# Patient Record
Sex: MALE | Race: WHITE | NOT HISPANIC OR LATINO | Employment: OTHER | URBAN - METROPOLITAN AREA
[De-identification: names, ages, dates, MRNs, and addresses within clinical notes are randomized per-mention and may not be internally consistent; named-entity substitution may affect disease eponyms.]

---

## 2017-02-16 DIAGNOSIS — I10 ESSENTIAL HYPERTENSION, BENIGN: ICD-10-CM

## 2017-02-16 DIAGNOSIS — M1A.9XX0 CHRONIC GOUT WITHOUT TOPHUS, UNSPECIFIED CAUSE, UNSPECIFIED SITE: ICD-10-CM

## 2017-02-16 DIAGNOSIS — E78.5 HYPERLIPIDEMIA: ICD-10-CM

## 2017-02-16 DIAGNOSIS — I25.10 CORONARY ARTERY DISEASE INVOLVING NATIVE CORONARY ARTERY WITHOUT ANGINA PECTORIS, UNSPECIFIED WHETHER NATIVE OR TRANSPLANTED HEART: Chronic | ICD-10-CM

## 2017-02-16 RX ORDER — ALLOPURINOL 300 MG/1
TABLET ORAL
Qty: 90 TABLET | Refills: 3 | Status: SHIPPED | OUTPATIENT
Start: 2017-02-16 | End: 2018-05-08 | Stop reason: SDUPTHER

## 2017-02-16 RX ORDER — ATORVASTATIN CALCIUM 40 MG/1
TABLET, FILM COATED ORAL
Qty: 90 TABLET | Refills: 3 | Status: SHIPPED | OUTPATIENT
Start: 2017-02-16 | End: 2018-05-08 | Stop reason: SDUPTHER

## 2017-02-16 RX ORDER — GLIPIZIDE 5 MG/1
TABLET ORAL
Qty: 180 TABLET | Refills: 3 | Status: SHIPPED | OUTPATIENT
Start: 2017-02-16 | End: 2018-05-08 | Stop reason: SDUPTHER

## 2017-02-16 RX ORDER — LOSARTAN POTASSIUM 25 MG/1
TABLET ORAL
Qty: 90 TABLET | Refills: 3 | Status: SHIPPED | OUTPATIENT
Start: 2017-02-16 | End: 2018-05-08 | Stop reason: SDUPTHER

## 2017-02-16 RX ORDER — METOPROLOL SUCCINATE 50 MG/1
TABLET, EXTENDED RELEASE ORAL
Qty: 90 TABLET | Refills: 3 | Status: SHIPPED | OUTPATIENT
Start: 2017-02-16 | End: 2018-05-08 | Stop reason: SDUPTHER

## 2017-02-17 DIAGNOSIS — E11.9 TYPE 2 DIABETES MELLITUS WITHOUT COMPLICATION: ICD-10-CM

## 2017-02-27 ENCOUNTER — PATIENT MESSAGE (OUTPATIENT)
Dept: FAMILY MEDICINE | Facility: CLINIC | Age: 77
End: 2017-02-27

## 2017-03-17 DIAGNOSIS — E11.9 TYPE 2 DIABETES MELLITUS WITHOUT COMPLICATION: ICD-10-CM

## 2017-04-13 DIAGNOSIS — E11.9 DIABETES MELLITUS WITHOUT COMPLICATION: ICD-10-CM

## 2017-06-30 ENCOUNTER — OFFICE VISIT (OUTPATIENT)
Dept: FAMILY MEDICINE | Facility: CLINIC | Age: 77
End: 2017-06-30
Payer: MEDICARE

## 2017-06-30 VITALS
HEART RATE: 69 BPM | DIASTOLIC BLOOD PRESSURE: 70 MMHG | TEMPERATURE: 98 F | BODY MASS INDEX: 34.08 KG/M2 | SYSTOLIC BLOOD PRESSURE: 124 MMHG | WEIGHT: 217.13 LBS | HEIGHT: 67 IN | RESPIRATION RATE: 22 BRPM

## 2017-06-30 DIAGNOSIS — E11.22 TYPE 2 DIABETES MELLITUS WITH STAGE 3 CHRONIC KIDNEY DISEASE, WITHOUT LONG-TERM CURRENT USE OF INSULIN: ICD-10-CM

## 2017-06-30 DIAGNOSIS — Z23 NEED FOR PROPHYLACTIC VACCINATION AGAINST STREPTOCOCCUS PNEUMONIAE (PNEUMOCOCCUS): ICD-10-CM

## 2017-06-30 DIAGNOSIS — J61 PULMONARY ASBESTOSIS: Chronic | ICD-10-CM

## 2017-06-30 DIAGNOSIS — I70.0 ATHEROSCLEROSIS OF AORTA: Chronic | ICD-10-CM

## 2017-06-30 DIAGNOSIS — M54.6 ACUTE LEFT-SIDED THORACIC BACK PAIN: Primary | ICD-10-CM

## 2017-06-30 DIAGNOSIS — J44.1 CHRONIC OBSTRUCTIVE PULMONARY DISEASE WITH ACUTE EXACERBATION: Chronic | ICD-10-CM

## 2017-06-30 DIAGNOSIS — N18.30 TYPE 2 DIABETES MELLITUS WITH STAGE 3 CHRONIC KIDNEY DISEASE, WITHOUT LONG-TERM CURRENT USE OF INSULIN: ICD-10-CM

## 2017-06-30 PROCEDURE — 3046F HEMOGLOBIN A1C LEVEL >9.0%: CPT | Mod: S$GLB,,, | Performed by: INTERNAL MEDICINE

## 2017-06-30 PROCEDURE — 90732 PPSV23 VACC 2 YRS+ SUBQ/IM: CPT | Mod: S$GLB,,, | Performed by: INTERNAL MEDICINE

## 2017-06-30 PROCEDURE — 1159F MED LIST DOCD IN RCRD: CPT | Mod: S$GLB,,, | Performed by: INTERNAL MEDICINE

## 2017-06-30 PROCEDURE — 1125F AMNT PAIN NOTED PAIN PRSNT: CPT | Mod: S$GLB,,, | Performed by: INTERNAL MEDICINE

## 2017-06-30 PROCEDURE — 99999 PR PBB SHADOW E&M-EST. PATIENT-LVL III: CPT | Mod: PBBFAC,,, | Performed by: INTERNAL MEDICINE

## 2017-06-30 PROCEDURE — 4010F ACE/ARB THERAPY RXD/TAKEN: CPT | Mod: S$GLB,,, | Performed by: INTERNAL MEDICINE

## 2017-06-30 PROCEDURE — 99214 OFFICE O/P EST MOD 30 MIN: CPT | Mod: 25,S$GLB,, | Performed by: INTERNAL MEDICINE

## 2017-06-30 PROCEDURE — G0009 ADMIN PNEUMOCOCCAL VACCINE: HCPCS | Mod: ,,, | Performed by: INTERNAL MEDICINE

## 2017-06-30 PROCEDURE — 99499 UNLISTED E&M SERVICE: CPT | Mod: S$GLB,,, | Performed by: INTERNAL MEDICINE

## 2017-06-30 NOTE — PROGRESS NOTES
This note was created by combination of typed  and Dragon dictation.  Transcription errors may be present.  If there are any questions, please contact me.    Assessment & Plan  Acute left-sided thoracic back pain-no red flag signs.  Heat packs, stretching, Tylenol.  I expect slow but steady resolution.    Need for prophylactic vaccination against Streptococcus pneumoniae (pneumococcus)  -     Pneumococcal Polysaccharide Vaccine (23 Valent) (SQ/IM)    Chronic obstructive pulmonary disease with acute exacerbation-on PRN inhalers at this time    Atherosclerosis of aorta-incidental on imaging, on statin    Pulmonary asbestosis-on inhaler therapy at this time.    Type 2 diabetes mellitus with stage 3 chronic kidney disease, without long-term current use of insulin-foot exam done today.  He'll schedule follow-up in the future with his primary care doctor    Other orders  -     Cancel: Tdap Vaccine        There are no discontinued medications.    Follow-up: No Follow-up on file.      =================================================================      Chief Complaint   Patient presents with    Back Pain     x3/days       HPI  James is a 77 y.o. male, last appointment with this clinic was Visit date not found.    Pt of Dr. Bee.  Followed for diabetes complicated by microalbuminuria.  COPD.  Coronary artery disease.  Hypertension.  Hyperlipidemia.  Hx of chronic LBP with hx of OV for R sided LBP without sciatica.    The other day - twisting would cause sharp pain in the back. Last night getting gout of bed, pain in the middle of the back.  Sitting still OK but twisting hurts.  This is different.  No pain with urination or BM. No radiation but across the lower back.  OTC meds for this - advil some relief.  No fever no chills.  No rash.      He is overdue for follow-up but has been caring for his wife and has not had the time to see about himself.    Patient Care Team:  Deepak Bee MD as PCP - General  (Internal Medicine)    Patient Active Problem List    Diagnosis Date Noted    Hyperlipidemia 02/16/2016    Type 2 diabetes mellitus with microalbuminuria or microproteinuria 02/16/2016    Atherosclerosis of aorta 02/16/2016     CT chest April 2014      Pulmonary asbestosis 12/11/2014    COPD (chronic obstructive pulmonary disease) 05/08/2014    Vertigo 08/13/2013    Gout, chronic 01/28/2013    Diverticulosis 01/23/2013    CAD (coronary artery disease) 01/23/2013     S/p 2 stents around 06-07      Essential hypertension, benign 09/26/2012       PAST MEDICAL HISTORY:  Past Medical History:   Diagnosis Date    Asbestosis     COPD with asthma     Coronary artery disease     Diabetes mellitus type II     Hypertension        PAST SURGICAL HISTORY:  Past Surgical History:   Procedure Laterality Date    APPENDECTOMY      8 yrs ago    CHOLECYSTECTOMY      8 yrs ago    CORONARY STENT PLACEMENT      SMALL INTESTINE SURGERY      8 yrs       SOCIAL HISTORY:  Social History     Social History    Marital status:      Spouse name: N/A    Number of children: N/A    Years of education: N/A     Occupational History    Not on file.     Social History Main Topics    Smoking status: Never Smoker    Smokeless tobacco: Never Used    Alcohol use No    Drug use: No    Sexual activity: Yes     Partners: Female     Other Topics Concern    Not on file     Social History Narrative    No narrative on file       ALLERGIES AND MEDICATIONS: updated and reviewed.  Review of patient's allergies indicates:  No Known Allergies  Current Outpatient Prescriptions   Medication Sig Dispense Refill    albuterol (PROAIR HFA) 90 mcg/actuation inhaler Inhale 2 puffs into the lungs every 6 (six) hours as needed for Wheezing. 1 Inhaler 0    allopurinol (ZYLOPRIM) 300 MG tablet TAKE 1 TABLET ONE TIME DAILY 90 tablet 3    aspirin (ECOTRIN) 81 MG EC tablet Take 1 tablet (81 mg total) by mouth once daily. (Patient taking  "differently: Take 162 mg by mouth once daily. ) 90 tablet 0    atorvastatin (LIPITOR) 40 MG tablet TAKE 1 TABLET EVERY DAY 90 tablet 3    blood sugar diagnostic (BLOOD GLUCOSE TEST) [facility] Accu- Check  Smartview Test Strips. Test  strip 11    glipiZIDE (GLUCOTROL) 5 MG tablet TAKE 1 TABLET TWICE DAILY BEFORE MEALS 180 tablet 3    lancets (ONETOUCH ULTRASOFT LANCETS) Misc Fastclix Lancets. Test  each 11    losartan (COZAAR) 25 MG tablet TAKE 1 TABLET EVERY DAY 90 tablet 3    meclizine (ANTIVERT) 25 mg tablet Take 1 tablet (25 mg total) by mouth 3 (three) times daily as needed for Dizziness. 90 tablet 0    metformin (GLUMETZA) 1000 MG (MOD) 24 hr tablet Take 1 tablet (1,000 mg total) by mouth once daily. 90 tablet 3    metoprolol succinate (TOPROL-XL) 50 MG 24 hr tablet TAKE 1 TABLET EVERY DAY 90 tablet 3    moxifloxacin (AVELOX) 400 mg tablet Take 1 tablet (400 mg total) by mouth once daily. 5 tablet 0    nitroGLYCERIN (NITROSTAT) 0.4 MG SL tablet Place 1 tablet (0.4 mg total) under the tongue every 5 (five) minutes as needed for Chest pain. 25 tablet 11    tiotropium (SPIRIVA) 18 mcg inhalation capsule Inhale 1 capsule (18 mcg total) into the lungs once daily. 90 capsule 3     No current facility-administered medications for this visit.        Review of Systems   Constitutional: Negative for chills and fever.   Gastrointestinal: Negative for abdominal pain.   Genitourinary: Negative for dysuria.   Musculoskeletal: Positive for back pain.       Physical Exam   Vitals:    06/30/17 1503   BP: 124/70   Pulse: 69   Resp: (!) 22   Temp: 97.8 °F (36.6 °C)   TempSrc: Oral   Weight: 98.5 kg (217 lb 2.5 oz)   Height: 5' 7" (1.702 m)    Body mass index is 34.01 kg/m².            Physical Exam   Constitutional: He is oriented to person, place, and time. He appears well-developed and well-nourished. No distress.   Cardiovascular: Normal rate and regular rhythm.    Pulses:       Dorsalis pedis pulses are 1+ " on the right side, and 1+ on the left side.        Posterior tibial pulses are 1+ on the right side, and 1+ on the left side.   Pulmonary/Chest: Effort normal and breath sounds normal.   Musculoskeletal:        Right foot: There is no deformity.        Left foot: There is no deformity.   He has some mild tenderness on palpation of the lower thoracic spine around the level of T10/T11, without deformity, crepitus, asymmetry.  The left paraspinal muscle on that level is tender, the rib cage is unremarkable, more lateral points on the same dermatome are unremarkable   Feet:   Right Foot:   Protective Sensation: 5 sites tested. 5 sites sensed.   Skin Integrity: Negative for ulcer, blister, skin breakdown, erythema or warmth.   Left Foot:   Protective Sensation: 5 sites tested. 5 sites sensed.   Skin Integrity: Negative for ulcer, blister, skin breakdown, erythema or warmth.   Neurological: He is alert and oriented to person, place, and time. He exhibits normal muscle tone.   Skin: Skin is warm and dry. No rash noted.   Specifically no rash over the area of complaint

## 2017-07-03 ENCOUNTER — TELEPHONE (OUTPATIENT)
Dept: PULMONOLOGY | Facility: CLINIC | Age: 77
End: 2017-07-03

## 2017-07-03 DIAGNOSIS — J61 PULMONARY ASBESTOSIS: Primary | ICD-10-CM

## 2017-07-03 NOTE — TELEPHONE ENCOUNTER
scheduled pt's appt to f/u on pulmonary asbestosis along with testing. Confirmed date/time/location. He verbalized understanding.     Please sign orders.

## 2017-07-03 NOTE — TELEPHONE ENCOUNTER
----- Message from Frances La sent at 7/3/2017  2:56 PM CDT -----  Contact: pt   Pt states that he need to get fitted in to see the doctor. Pt states that he has scar on lungs.  ...[phone] (home)

## 2017-08-01 ENCOUNTER — OFFICE VISIT (OUTPATIENT)
Dept: PULMONOLOGY | Facility: CLINIC | Age: 77
End: 2017-08-01
Payer: MEDICARE

## 2017-08-01 ENCOUNTER — HOSPITAL ENCOUNTER (OUTPATIENT)
Dept: RADIOLOGY | Facility: HOSPITAL | Age: 77
Discharge: HOME | End: 2017-08-01
Attending: INTERNAL MEDICINE
Payer: MEDICARE

## 2017-08-01 ENCOUNTER — PROCEDURE VISIT (OUTPATIENT)
Dept: PULMONOLOGY | Facility: CLINIC | Age: 77
End: 2017-08-01
Payer: MEDICARE

## 2017-08-01 VITALS
SYSTOLIC BLOOD PRESSURE: 120 MMHG | BODY MASS INDEX: 33.9 KG/M2 | HEIGHT: 67 IN | OXYGEN SATURATION: 96 % | HEART RATE: 72 BPM | RESPIRATION RATE: 22 BRPM | WEIGHT: 216 LBS | DIASTOLIC BLOOD PRESSURE: 74 MMHG

## 2017-08-01 DIAGNOSIS — J44.9 CHRONIC OBSTRUCTIVE PULMONARY DISEASE, UNSPECIFIED COPD TYPE: Chronic | ICD-10-CM

## 2017-08-01 DIAGNOSIS — J61 PULMONARY ASBESTOSIS: ICD-10-CM

## 2017-08-01 PROCEDURE — 99215 OFFICE O/P EST HI 40 MIN: CPT | Mod: 25,S$GLB,, | Performed by: INTERNAL MEDICINE

## 2017-08-01 PROCEDURE — 99499 UNLISTED E&M SERVICE: CPT | Mod: S$GLB,,, | Performed by: INTERNAL MEDICINE

## 2017-08-01 PROCEDURE — 3008F BODY MASS INDEX DOCD: CPT | Mod: S$GLB,,, | Performed by: INTERNAL MEDICINE

## 2017-08-01 PROCEDURE — 99999 PR PBB SHADOW E&M-EST. PATIENT-LVL IV: CPT | Mod: PBBFAC,,, | Performed by: INTERNAL MEDICINE

## 2017-08-01 PROCEDURE — 1159F MED LIST DOCD IN RCRD: CPT | Mod: S$GLB,,, | Performed by: INTERNAL MEDICINE

## 2017-08-01 PROCEDURE — 94060 EVALUATION OF WHEEZING: CPT | Mod: S$GLB,,, | Performed by: INTERNAL MEDICINE

## 2017-08-01 PROCEDURE — 94726 PLETHYSMOGRAPHY LUNG VOLUMES: CPT | Mod: S$GLB,,, | Performed by: INTERNAL MEDICINE

## 2017-08-01 PROCEDURE — 71030 XR CHEST 4 OR MORE VIEW: CPT | Mod: 26,,, | Performed by: RADIOLOGY

## 2017-08-01 PROCEDURE — 94729 DIFFUSING CAPACITY: CPT | Mod: S$GLB,,, | Performed by: INTERNAL MEDICINE

## 2017-08-01 RX ORDER — ALBUTEROL SULFATE 90 UG/1
2 AEROSOL, METERED RESPIRATORY (INHALATION) EVERY 6 HOURS PRN
Qty: 3 INHALER | Refills: 3 | Status: SHIPPED | OUTPATIENT
Start: 2017-08-01 | End: 2021-11-22 | Stop reason: SDUPTHER

## 2017-08-01 RX ORDER — TIOTROPIUM BROMIDE 18 UG/1
18 CAPSULE ORAL; RESPIRATORY (INHALATION) DAILY
Qty: 90 CAPSULE | Refills: 3 | Status: SHIPPED | OUTPATIENT
Start: 2017-08-01 | End: 2017-12-05

## 2017-08-01 NOTE — PROGRESS NOTES
Subjective:       Patient ID: James Mae Jr. is a 77 y.o. male.    Chief Complaint: He       COPD    HPI       Dyspnea  Patient complains of shortness of breath. Symptoms occur while getting dressed, with one block walking. Symptoms began 14 years ago, gradually worsening since. Associated symptoms include  dry cough, dyspnea on exertion, post nasal drip and shortness of breath. He denies chest pain, located left chest. He does not have had recent travel. Weight has been stable. Symptoms are exacerbated by minimal activity. Symptoms are alleviated by rest.   Awakens at 2- 3 am  Nonsmoker  reports that he has never smoked. He has never used smokeless tobacco.      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   First exposure to asbestos: 1957  Last exposure to asbestos: 2005    Welding: frequently  Sandblasting: workled around  Toxic Fume Exposure: gassed with natural jamila             Past Medical History:   Diagnosis Date    Asbestosis     COPD with asthma     Coronary artery disease     Diabetes mellitus type II     Hypertension      Past Surgical History:   Procedure Laterality Date    APPENDECTOMY      8 yrs ago    CHOLECYSTECTOMY      8 yrs ago    CORONARY STENT PLACEMENT      SMALL INTESTINE SURGERY      8 yrs     Social History     Social History    Marital status:      Spouse name: N/A    Number of children: N/A    Years of education: N/A     Occupational History    Not on file.     Social History Main Topics    Smoking status: Never Smoker    Smokeless tobacco: Never Used    Alcohol use No    Drug use: No    Sexual activity: Yes     Partners: Female     Other Topics Concern    Not on file     Social History Narrative    No narrative on file     Review of Systems   Constitutional: Positive for fatigue. Negative for  fever.   HENT: Positive for postnasal drip, rhinorrhea and congestion.    Eyes: Negative for redness and itching.   Respiratory: Positive for cough, sputum production, shortness of breath, dyspnea on extertion, use of rescue inhaler and Paroxysmal Nocturnal Dyspnea.    Cardiovascular: Negative for chest pain, palpitations and leg swelling.   Genitourinary: Negative for difficulty urinating and hematuria.   Endocrine: Negative for cold intolerance and heat intolerance.    Skin: Negative for rash.   Gastrointestinal: Negative for nausea and abdominal pain.   Neurological: Negative for dizziness, syncope, weakness and light-headedness.   Hematological: Negative for adenopathy. Does not bruise/bleed easily.   Psychiatric/Behavioral: Negative for sleep disturbance. The patient is not nervous/anxious.        Objective:      Physical Exam   Constitutional: He is oriented to person, place, and time. He appears well-developed and well-nourished.   HENT:   Head: Normocephalic and atraumatic.   Mouth/Throat: Oropharyngeal exudate present.   Eyes: Conjunctivae are normal. Pupils are equal, round, and reactive to light.   Neck: Neck supple. No JVD present. No tracheal deviation present. No thyromegaly present.   Cardiovascular: Normal rate, regular rhythm and normal heart sounds.    No murmur heard.  Pulmonary/Chest: Effort normal. No respiratory distress. He has decreased breath sounds. He has no wheezes. He has no rhonchi. He has rales in the right lower field and the left lower field. He exhibits no tenderness.   Abdominal: Soft. Bowel sounds are normal.   Musculoskeletal: Normal range of motion. He exhibits no edema or tenderness.   Lymphadenopathy:     He has no cervical adenopathy.   Neurological: He is alert and oriented to person, place, and time.   Skin: Skin is warm and dry.   Nursing note and vitals reviewed.    Personal Diagnostic Review  Chest x-ray: miild interstitial changes  Pulmonary function tests: stable    Moderate restriction.  Airflow is not clearly improved after bronchodilator. Clinical improvement following bronchodilator therapy may occur in  the absence of spirometric improvement.  Moderate reduction in diffusion capacity - unadjusted for hemoglobin. (DLCO > 40% and < 59%).  (Physician 08/03/2017 01:16PM, DR. Brian Flores / Final: 08/03/2017 01:16PM, DR. Brian Flores    No flowsheet data found.      Assessment:       1. Chronic obstructive pulmonary disease, unspecified COPD type    2. Pulmonary asbestosis        Outpatient Encounter Prescriptions as of 8/1/2017   Medication Sig Dispense Refill    albuterol (PROAIR HFA) 90 mcg/actuation inhaler Inhale 2 puffs into the lungs every 6 (six) hours as needed for Wheezing. 3 Inhaler 3    allopurinol (ZYLOPRIM) 300 MG tablet TAKE 1 TABLET ONE TIME DAILY 90 tablet 3    atorvastatin (LIPITOR) 40 MG tablet TAKE 1 TABLET EVERY DAY 90 tablet 3    blood sugar diagnostic (BLOOD GLUCOSE TEST) [facility] Accu- Check  Smartview Test Strips. Test  strip 11    glipiZIDE (GLUCOTROL) 5 MG tablet TAKE 1 TABLET TWICE DAILY BEFORE MEALS 180 tablet 3    lancets (ONETOUCH ULTRASOFT LANCETS) Misc Fastclix Lancets. Test  each 11    losartan (COZAAR) 25 MG tablet TAKE 1 TABLET EVERY DAY 90 tablet 3    metformin (GLUMETZA) 1000 MG (MOD) 24 hr tablet Take 1 tablet (1,000 mg total) by mouth once daily. 90 tablet 3    metoprolol succinate (TOPROL-XL) 50 MG 24 hr tablet TAKE 1 TABLET EVERY DAY 90 tablet 3    tiotropium (SPIRIVA) 18 mcg inhalation capsule Inhale 1 capsule (18 mcg total) into the lungs once daily. 90 capsule 3    [DISCONTINUED] albuterol (PROAIR HFA) 90 mcg/actuation inhaler Inhale 2 puffs into the lungs every 6 (six) hours as needed for Wheezing. 1 Inhaler 0    [DISCONTINUED] tiotropium (SPIRIVA) 18 mcg inhalation capsule Inhale 1 capsule (18 mcg total) into the lungs once daily. 90 capsule 3    aspirin (ECOTRIN) 81 MG EC tablet Take 1 tablet (81 mg total)  by mouth once daily. (Patient taking differently: Take 162 mg by mouth once daily. ) 90 tablet 0    meclizine (ANTIVERT) 25 mg tablet Take 1 tablet (25 mg total) by mouth 3 (three) times daily as needed for Dizziness. 90 tablet 0    nitroGLYCERIN (NITROSTAT) 0.4 MG SL tablet Place 1 tablet (0.4 mg total) under the tongue every 5 (five) minutes as needed for Chest pain. 25 tablet 11    [DISCONTINUED] moxifloxacin (AVELOX) 400 mg tablet Take 1 tablet (400 mg total) by mouth once daily. 5 tablet 0     No facility-administered encounter medications on file as of 8/1/2017.      Orders Placed This Encounter   Procedures    X-Ray Chest 4 Or More View     Standing Status:   Future     Standing Expiration Date:   8/7/2018     Order Specific Question:   Reason for Exam:     Answer:   asbestosis    Complete PFT with bronchodilator     Standing Status:   Future     Standing Expiration Date:   2/6/2019     Plan:       Requested Prescriptions     Signed Prescriptions Disp Refills    tiotropium (SPIRIVA) 18 mcg inhalation capsule 90 capsule 3     Sig: Inhale 1 capsule (18 mcg total) into the lungs once daily.    albuterol (PROAIR HFA) 90 mcg/actuation inhaler 3 Inhaler 3     Sig: Inhale 2 puffs into the lungs every 6 (six) hours as needed for Wheezing.     Chronic obstructive pulmonary disease, unspecified COPD type  -     tiotropium (SPIRIVA) 18 mcg inhalation capsule; Inhale 1 capsule (18 mcg total) into the lungs once daily.  Dispense: 90 capsule; Refill: 3  -     albuterol (PROAIR HFA) 90 mcg/actuation inhaler; Inhale 2 puffs into the lungs every 6 (six) hours as needed for Wheezing.  Dispense: 3 Inhaler; Refill: 3  -     Complete PFT with bronchodilator; Future; Expected date: 08/06/2017  -     X-Ray Chest 4 Or More View; Future; Expected date: 08/06/2017    Pulmonary asbestosis  -     albuterol (PROAIR HFA) 90 mcg/actuation inhaler; Inhale 2 puffs into the lungs every 6 (six) hours as needed for Wheezing.  Dispense: 3  Inhaler; Refill: 3  -     Complete PFT with bronchodilator; Future; Expected date: 08/06/2017  -     X-Ray Chest 4 Or More View; Future; Expected date: 08/06/2017           Return in about 1 year (around 8/1/2018) for cxr and pft.    MEDICAL DECISION MAKING: Moderate to high complexity.  Overall, the multiple problems listed are of moderate to high severity that may impact quality of life and activities of daily living. Side effects of medications, treatment plan as well as options and alternatives reviewed and discussed with patient. There was counseling of patient concerning these issues.    Total time spent in face to face counseling and coordination of care - 40  minutes over 50% of time was used in discussion of prognosis, risks, benefits of treatment, instructions and compliance with regimen . Discussion with other physicians or health care providers (DME, NP, pharmacy, respiratory therapy) occurred.

## 2017-08-03 ENCOUNTER — TELEPHONE (OUTPATIENT)
Dept: PULMONOLOGY | Facility: CLINIC | Age: 77
End: 2017-08-03

## 2017-08-03 LAB
POST FEF 25 75: 2.84 L/S (ref 1.21–2.67)
POST FET 100: 8.89 S
POST FEV1 FVC: 86 %
POST FEV1: 1.96 L (ref 2.32–3.04)
POST FIF 50: 1.97 L/S
POST FVC: 2.27 L (ref 3.3–4.15)
POST PEF: 3.86 L/S (ref 6.09–8.21)
PRE DLCO: 12.1 ML/MMHG/MIN (ref 17.17–25.46)
PRE ERV: 0.28 L
PRE FEF 25 75: 2.24 L/S (ref 1.21–2.67)
PRE FET 100: 7.74 S
PRE FEV1 FVC: 85 %
PRE FEV1: 1.83 L (ref 2.32–3.04)
PRE FIF 50: 1.32 L/S
PRE FRC PL: 2.31 L (ref 2.35–3.56)
PRE FVC: 2.15 L (ref 3.3–4.15)
PRE KROGHS K: 3.25 1/MIN
PRE PEF: 3.9 L/S (ref 6.09–8.21)
PRE RV: 2.03 L (ref 2.03–2.82)
PRE SVC: 2.28 L
PRE TLC: 4.31 L (ref 5.22–6.22)
PREDICTED DLCO: 21.31 ML/MMHG/MIN (ref 17.17–25.46)
PREDICTED FEV1 FVC: 72.57 % (ref 67.73–77.41)
PREDICTED FEV1: 2.68 L (ref 2.32–3.04)
PREDICTED FRC N2: 2.95 L (ref 2.35–3.56)
PREDICTED FRC PL: 2.95 L (ref 2.35–3.56)
PREDICTED FVC: 3.73 L (ref 3.3–4.15)
PREDICTED RV: 2.42 L (ref 2.03–2.82)
PREDICTED SVC: 3.33 L
PREDICTED TLC: 5.72 L (ref 5.22–6.22)
PROVOCATION PROTOCOL: ABNORMAL

## 2017-08-03 NOTE — TELEPHONE ENCOUNTER
----- Message from Rolly Mendes sent at 8/3/2017  9:32 AM CDT -----  Contact: [facility]   [facility] is calling to have pt medication changed from( Proair to Bentoline) because it is cheaper callback number is [phone]

## 2017-08-03 NOTE — TELEPHONE ENCOUNTER
Spoke with Sheryl with [facility] pharmacy, informed ok to change Pro air to Ventolin per Dr. Flores. She verbalized understanding.

## 2017-08-04 ENCOUNTER — OFFICE VISIT (OUTPATIENT)
Dept: FAMILY MEDICINE | Facility: CLINIC | Age: 77
End: 2017-08-04
Payer: MEDICARE

## 2017-08-04 VITALS
DIASTOLIC BLOOD PRESSURE: 64 MMHG | TEMPERATURE: 98 F | HEART RATE: 79 BPM | OXYGEN SATURATION: 95 % | SYSTOLIC BLOOD PRESSURE: 120 MMHG | HEIGHT: 67 IN | BODY MASS INDEX: 34.22 KG/M2 | WEIGHT: 218.06 LBS

## 2017-08-04 DIAGNOSIS — E11.22 TYPE 2 DIABETES MELLITUS WITH STAGE 3 CHRONIC KIDNEY DISEASE, WITHOUT LONG-TERM CURRENT USE OF INSULIN: ICD-10-CM

## 2017-08-04 DIAGNOSIS — J44.1 CHRONIC OBSTRUCTIVE PULMONARY DISEASE WITH ACUTE EXACERBATION: Chronic | ICD-10-CM

## 2017-08-04 DIAGNOSIS — I70.0 ATHEROSCLEROSIS OF AORTA: Chronic | ICD-10-CM

## 2017-08-04 DIAGNOSIS — J61 PULMONARY ASBESTOSIS: Chronic | ICD-10-CM

## 2017-08-04 DIAGNOSIS — N18.30 TYPE 2 DIABETES MELLITUS WITH STAGE 3 CHRONIC KIDNEY DISEASE, WITHOUT LONG-TERM CURRENT USE OF INSULIN: ICD-10-CM

## 2017-08-04 DIAGNOSIS — Z00.00 ENCOUNTER FOR PREVENTIVE HEALTH EXAMINATION: Primary | ICD-10-CM

## 2017-08-04 PROCEDURE — 99999 PR PBB SHADOW E&M-EST. PATIENT-LVL V: CPT | Mod: PBBFAC,,, | Performed by: NURSE PRACTITIONER

## 2017-08-04 PROCEDURE — 99499 UNLISTED E&M SERVICE: CPT | Mod: S$GLB,,, | Performed by: NURSE PRACTITIONER

## 2017-08-04 PROCEDURE — G0439 PPPS, SUBSEQ VISIT: HCPCS | Mod: S$GLB,,, | Performed by: NURSE PRACTITIONER

## 2017-08-04 NOTE — PROGRESS NOTES
"James Mae presented for a  Medicare AWV and comprehensive Health Risk Assessment today. The following components were reviewed and updated:    · Medical history  · Family History  · Social history  · Allergies and Current Medications  · Health Risk Assessment  · Health Maintenance  · Care Team     ** See Completed Assessments for Annual Wellness Visit within the encounter summary.**       The following assessments were completed:  · Living Situation  · CAGE  · Depression Screening  · Timed Get Up and Go  · Whisper Test  · Cognitive Function Screening  · Nutrition Screening  · ADL Screening  · PAQ Screening    Vitals:    08/04/17 1017   BP: 120/64   BP Location: Right arm   Patient Position: Sitting   BP Method: Manual   Pulse: 79   Temp: 98.3 °F (36.8 °C)   TempSrc: Oral   SpO2: 95%   Weight: 98.9 kg (218 lb 0.6 oz)   Height: 5' 7" (1.702 m)     Body mass index is 34.15 kg/m².  Physical Exam   Constitutional: He is oriented to person, place, and time. He appears well-developed and well-nourished.   HENT:   Head: Normocephalic.   Cardiovascular: Normal rate, regular rhythm and normal heart sounds.    Pulmonary/Chest: Effort normal and breath sounds normal. No respiratory distress.   Neurological: He is alert and oriented to person, place, and time.   Skin: He is not diaphoretic. No pallor.   Psychiatric: He has a normal mood and affect. His behavior is normal. Thought content normal.         Diagnoses and health risks identified today and associated recommendations/orders:    1. Atherosclerosis of aorta    2. Chronic obstructive pulmonary disease with acute exacerbation    3. Type 2 diabetes mellitus with stage 3 chronic kidney disease, without long-term current use of insulin  - Diabetic Eye Screening Photo; Future    4. Pulmonary asbestosis    5. Encounter for preventive health examination      Problem List Items Addressed This Visit     Type 2 diabetes mellitus with kidney complication, without long-term " current use of insulin    Current Assessment & Plan     This problem is currently not controlled. Please follow up with your PCP as planned to discuss adjustments to your treatment plan.  The patient is asked to make an attempt to improve diet and exercise patterns to aid in medical management of this problem.  Last HgbA1c was 8.2 09/01/2016         Relevant Orders    Diabetic Eye Screening Photo    Pulmonary asbestosis (Chronic)    Current Assessment & Plan     Stable. Monitor         COPD (chronic obstructive pulmonary disease) (Chronic)    Current Assessment & Plan     Stable. Monitor         Atherosclerosis of aorta (Chronic)    Overview     CT chest April 2014         Current Assessment & Plan     Stable. Monitor           Other Visit Diagnoses     Encounter for preventive health examination    -  Primary            Provided James with a 5-10 year written screening schedule and personal prevention plan. Recommendations were developed using the USPSTF age appropriate recommendations. Education, counseling, and referrals were provided as needed. After Visit Summary printed and given to patient which includes a list of additional screenings\tests needed.    Return in about 1 year (around 8/4/2018).    SIXTO BrysonC

## 2017-08-04 NOTE — ASSESSMENT & PLAN NOTE
This problem is currently not controlled. Please follow up with your PCP as planned to discuss adjustments to your treatment plan.  The patient is asked to make an attempt to improve diet and exercise patterns to aid in medical management of this problem.  Last HgbA1c was 8.2 09/01/2016

## 2017-08-04 NOTE — PATIENT INSTRUCTIONS
Counseling and Referral of Other Preventative  (Italic type indicates deductible and co-insurance are waived)    Patient Name: James Mae  Today's Date: 8/4/2017      SERVICE LIMITATIONS RECOMMENDATION    Vaccines    · Pneumococcal (once after 65)    · Influenza (annually)    · Hepatitis B (if medium/high risk)    · Prevnar 13      Hepatitis B medium/high risk factors:       - End-stage renal disease       - Hemophiliacs who received Factor VII or         IX concentrates       - Clients of institutions for the mentally             retarded       - Persons who live in the same house as          a HepB carrier       - Homosexual men       - Illicit injectable drug abusers     Pneumococcal: Done, no repeat necessary     Influenza: N/A     Hepatitis B: N/A     Prevnar 13: Done, no repeat necessary    Prostate cancer screening (annually to age 75)     Prostate specific antigen (PSA) Shared decision making with Provider. Sometimes a co-pay may be required if the patient decides to have this test. The USPSTF no longer recommends prostate cancer screening routinely in medicine: not to follow    Colorectal cancer screening (to age 75)    · Fecal occult blood test (annual)  · Flexible sigmoidoscopy (5y)  · Screening colonoscopy (10y)  · Barium enema   Last done 01/24/2013, recommend to repeat every 0  . No recommendation. Poor prep.    Diabetes self-management training (no USPSTF recommendations)  Requires referral by treating physician for patient with diabetes or renal disease. 10 hours of initial DSMT sessions of no less than 30 minutes each in a continuous 12-month period. 2 hours of follow-up DSMT in subsequent years.  Diabetic Education ordered    Glaucoma screening (no USPSTF recommendation)  Diabetes mellitus, family history   , age 50 or over    American, age 65 or over  annual eye exams    Medical nutrition therapy for diabetes or renal disease (no recommended schedule)  Requires referral  by treating physician for patient with diabetes or renal disease or kidney transplant within the past 3 years.  Can be provided in same year as diabetes self-management training (DSMT), and CMS recommends medical nutrition therapy take place after DSMT. Up to 3 hours for initial year and 2 hours in subsequent years.  N/A    Cardiovascular screening blood tests (every 5 years)  · Fasting lipid panel  Order as a panel if possible  Last done 02/16/2016, recommend to repeat every 1  years    Diabetes screening tests (at least every 3 years, Medicare covers annually or at 6-month intervals for prediabetic patients)  · Fasting blood sugar (FBS) or glucose tolerance test (GTT)  Patient must be diagnosed with one of the following:       - Hypertension       - Dyslipidemia       - Obesity (BMI 30kg/m2)       - Previous elevated impaired FBS or GTT       ... or any two of the following:       - Overweight (BMI 25 but <30)       - Family history of diabetes       - Age 65 or older       - History of gestational diabetes or birth of baby weighing more than 9 pounds  Last done 09/01/2016, recommend to repeat every 3  months    Abdominal aortic aneurysm screening (once)  · Sonogram   Limited to patients who meet one of the following criteria:       - Men who are 65-75 years old and have smoked more than 100 cigarette in their lifetime       - Anyone with a family history of abdominal aortic aneurysm       - Anyone recommended for screening by the USPSTF  N/A    HIV screening (annually for increased risk patients)  · HIV-1 and HIV-2 by EIA, or DILEEP, rapid antibody test or oral mucosa transudate  Patients must be at increased risk for HIV infection per USPSTF guidelines or pregnant. Tests covered annually for patient at increased risk or as requested by the patient. Pregnant patients may receive up to 3 tests during pregnancy.  Risks discussed, screening is not recommended    Smoking cessation counseling (up to 8 sessions per  year)  Patients must be asymptomatic of tobacco-related conditions to receive as a preventative service.  Non-smoker    Subsequent annual wellness visit  At least 12 months since last AWV  Return in one year     The following information is provided to all patients.  This information is to help you find resources for any of the problems found today that may be affecting your health:                Living healthy guide: www.[facility].[place].[facility]      Understanding Diabetes: www.diabetes.org      Eating healthy: www.cdc.gov/healthyweight      CDC home safety checklist: www.cdc.gov/steadi/patient.html      Agency on Aging: www.goea.[place].[facility]      Alcoholics anonymous (AA): www.aa.org      Physical Activity: www.iva.nih.gov/ev7made      Tobacco use: www.quitwithusla.org

## 2017-08-10 ENCOUNTER — TELEPHONE (OUTPATIENT)
Dept: OPHTHALMOLOGY | Facility: CLINIC | Age: 77
End: 2017-08-10

## 2017-08-10 ENCOUNTER — LAB VISIT (OUTPATIENT)
Dept: LAB | Facility: HOSPITAL | Age: 77
End: 2017-08-10
Attending: INTERNAL MEDICINE
Payer: MEDICARE

## 2017-08-10 ENCOUNTER — CLINICAL SUPPORT (OUTPATIENT)
Dept: OPHTHALMOLOGY | Facility: CLINIC | Age: 77
End: 2017-08-10
Attending: NURSE PRACTITIONER
Payer: MEDICARE

## 2017-08-10 DIAGNOSIS — N18.30 TYPE 2 DIABETES MELLITUS WITH STAGE 3 CHRONIC KIDNEY DISEASE, WITHOUT LONG-TERM CURRENT USE OF INSULIN: ICD-10-CM

## 2017-08-10 DIAGNOSIS — Z96.1 PSEUDOPHAKIA OF BOTH EYES: Primary | ICD-10-CM

## 2017-08-10 DIAGNOSIS — E11.9 TYPE 2 DIABETES MELLITUS WITHOUT COMPLICATION: ICD-10-CM

## 2017-08-10 DIAGNOSIS — E11.22 TYPE 2 DIABETES MELLITUS WITH STAGE 3 CHRONIC KIDNEY DISEASE, WITHOUT LONG-TERM CURRENT USE OF INSULIN: ICD-10-CM

## 2017-08-10 DIAGNOSIS — H35.033 HYPERTENSIVE RETINOPATHY, GRADE 1, BILATERAL: Primary | ICD-10-CM

## 2017-08-10 LAB
CHOLEST/HDLC SERPL: 4 {RATIO}
HDL/CHOLESTEROL RATIO: 24.8 %
HDLC SERPL-MCNC: 141 MG/DL
HDLC SERPL-MCNC: 35 MG/DL
LDLC SERPL CALC-MCNC: 63.4 MG/DL
NONHDLC SERPL-MCNC: 106 MG/DL
TRIGL SERPL-MCNC: 213 MG/DL

## 2017-08-10 PROCEDURE — 92250 FUNDUS PHOTOGRAPHY W/I&R: CPT | Mod: S$GLB,,, | Performed by: OPHTHALMOLOGY

## 2017-08-10 PROCEDURE — 99999 PR PBB SHADOW E&M-EST. PATIENT-LVL I: CPT | Mod: PBBFAC,,,

## 2017-08-10 NOTE — PROGRESS NOTES
HPI     76 y/o here for screening for diabetic retinopathy with non-dilated   fundus photos per   Dr. Bee. Test done by Vielka Black.         Last edited by Georgina Ryder on 8/10/2017 11:15 AM. (History)            Assessment /Plan     For exam results, see Encounter Report.    Type 2 diabetes mellitus with stage 3 chronic kidney disease, without long-term current use of insulin  -     Diabetic Eye Screening Photo      Please see Dr. Pineda progress note for interpretation.

## 2017-08-11 ENCOUNTER — TELEPHONE (OUTPATIENT)
Dept: FAMILY MEDICINE | Facility: CLINIC | Age: 77
End: 2017-08-11

## 2017-08-11 LAB
ESTIMATED AVG GLUCOSE: 209 MG/DL
HBA1C MFR BLD HPLC: 8.9 %

## 2017-08-15 PROBLEM — Z96.1 PSEUDOPHAKIA OF BOTH EYES: Status: ACTIVE | Noted: 2017-08-15

## 2017-08-24 ENCOUNTER — OFFICE VISIT (OUTPATIENT)
Dept: FAMILY MEDICINE | Facility: CLINIC | Age: 77
End: 2017-08-24
Payer: MEDICARE

## 2017-08-24 ENCOUNTER — LAB VISIT (OUTPATIENT)
Dept: LAB | Facility: HOSPITAL | Age: 77
End: 2017-08-24
Attending: INTERNAL MEDICINE
Payer: MEDICARE

## 2017-08-24 VITALS
OXYGEN SATURATION: 97 % | WEIGHT: 217.13 LBS | HEIGHT: 64 IN | BODY MASS INDEX: 37.07 KG/M2 | SYSTOLIC BLOOD PRESSURE: 136 MMHG | DIASTOLIC BLOOD PRESSURE: 70 MMHG | HEART RATE: 85 BPM | TEMPERATURE: 98 F

## 2017-08-24 DIAGNOSIS — E11.22 TYPE 2 DIABETES MELLITUS WITH STAGE 3 CHRONIC KIDNEY DISEASE, WITHOUT LONG-TERM CURRENT USE OF INSULIN: ICD-10-CM

## 2017-08-24 DIAGNOSIS — E78.2 MIXED HYPERLIPIDEMIA: Chronic | ICD-10-CM

## 2017-08-24 DIAGNOSIS — I10 ESSENTIAL HYPERTENSION, BENIGN: Chronic | ICD-10-CM

## 2017-08-24 DIAGNOSIS — M1A.9XX0 CHRONIC GOUT WITHOUT TOPHUS, UNSPECIFIED CAUSE, UNSPECIFIED SITE: Chronic | ICD-10-CM

## 2017-08-24 DIAGNOSIS — N18.30 TYPE 2 DIABETES MELLITUS WITH STAGE 3 CHRONIC KIDNEY DISEASE, WITHOUT LONG-TERM CURRENT USE OF INSULIN: ICD-10-CM

## 2017-08-24 DIAGNOSIS — J61 PULMONARY ASBESTOSIS: Chronic | ICD-10-CM

## 2017-08-24 DIAGNOSIS — I25.10 CORONARY ARTERY DISEASE INVOLVING NATIVE CORONARY ARTERY WITHOUT ANGINA PECTORIS, UNSPECIFIED WHETHER NATIVE OR TRANSPLANTED HEART: Chronic | ICD-10-CM

## 2017-08-24 DIAGNOSIS — Z00.00 ROUTINE MEDICAL EXAM: Primary | ICD-10-CM

## 2017-08-24 DIAGNOSIS — J44.9 CHRONIC OBSTRUCTIVE PULMONARY DISEASE, UNSPECIFIED COPD TYPE: Chronic | ICD-10-CM

## 2017-08-24 DIAGNOSIS — I70.0 ATHEROSCLEROSIS OF AORTA: Chronic | ICD-10-CM

## 2017-08-24 LAB
ALBUMIN SERPL BCP-MCNC: 3.6 G/DL
ALP SERPL-CCNC: 78 U/L
ALT SERPL W/O P-5'-P-CCNC: 16 U/L
ANION GAP SERPL CALC-SCNC: 7 MMOL/L
AST SERPL-CCNC: 20 U/L
BILIRUB SERPL-MCNC: 1.7 MG/DL
BUN SERPL-MCNC: 25 MG/DL
CALCIUM SERPL-MCNC: 9.3 MG/DL
CHLORIDE SERPL-SCNC: 107 MMOL/L
CO2 SERPL-SCNC: 24 MMOL/L
CREAT SERPL-MCNC: 1.4 MG/DL
EST. GFR  (AFRICAN AMERICAN): 56.4 ML/MIN/1.73 M^2
EST. GFR  (NON AFRICAN AMERICAN): 48.8 ML/MIN/1.73 M^2
GLUCOSE SERPL-MCNC: 192 MG/DL
POTASSIUM SERPL-SCNC: 4.6 MMOL/L
PROT SERPL-MCNC: 7.8 G/DL
SODIUM SERPL-SCNC: 138 MMOL/L
URATE SERPL-MCNC: 5.2 MG/DL

## 2017-08-24 PROCEDURE — 99999 PR PBB SHADOW E&M-EST. PATIENT-LVL III: CPT | Mod: PBBFAC,,, | Performed by: INTERNAL MEDICINE

## 2017-08-24 PROCEDURE — 99499 UNLISTED E&M SERVICE: CPT | Mod: S$GLB,,, | Performed by: INTERNAL MEDICINE

## 2017-08-24 PROCEDURE — 84550 ASSAY OF BLOOD/URIC ACID: CPT

## 2017-08-24 PROCEDURE — 36415 COLL VENOUS BLD VENIPUNCTURE: CPT | Mod: PO

## 2017-08-24 PROCEDURE — 80053 COMPREHEN METABOLIC PANEL: CPT

## 2017-08-24 PROCEDURE — 99397 PER PM REEVAL EST PAT 65+ YR: CPT | Mod: S$GLB,,, | Performed by: INTERNAL MEDICINE

## 2017-08-24 NOTE — PROGRESS NOTES
Patient, James Mae Jr. (MRN #[id number]), presented with a recorded BMI of 37.27 kg/m^2 and a documented comorbidity(s):  - Diabetes Mellitus Type 2  - Hypertension  - Hyperlipidemia  to which the severe obesity is a contributing factor. This is consistent with the definition of severe obesity (BMI 35.0-35.9) with comorbidity (ICD-10 E66.01, Z68.35). The patient's severe obesity was monitored, evaluated, addressed and/or treated. This addendum to the medical record is made on 08/24/2017.

## 2017-08-24 NOTE — PROGRESS NOTES
Chief Complaint  Chief Complaint   Patient presents with    Annual Exam       HPI  James Mae Jr. is a 77 y.o. male with multiple medical diagnoses as listed in the medical history and problem list that presents for routine physical.  Pt is known to me with his last appointment 8/4/2017.  No acute complaints.  He is not regularly checking his blood sugars at home. Reports that the last time that he checked it was 150s.  He is typically very pre-occupied with caring for his wife who was recently confirmed to have alzheimer's dementia and is my patient as well.       PAST MEDICAL HISTORY:  Past Medical History:   Diagnosis Date    Asbestosis     COPD with asthma     Coronary artery disease     Diabetes mellitus type II     Hypertension        PAST SURGICAL HISTORY:  Past Surgical History:   Procedure Laterality Date    APPENDECTOMY      8 yrs ago    CHOLECYSTECTOMY      8 yrs ago    CORONARY STENT PLACEMENT      SMALL INTESTINE SURGERY      8 yrs       SOCIAL HISTORY:  Social History     Social History    Marital status:      Spouse name: N/A    Number of children: N/A    Years of education: N/A     Occupational History    Not on file.     Social History Main Topics    Smoking status: Never Smoker    Smokeless tobacco: Never Used    Alcohol use No    Drug use: No    Sexual activity: Yes     Partners: Female     Other Topics Concern    Not on file     Social History Narrative    No narrative on file       FAMILY HISTORY:  Family History   Problem Relation Age of Onset    Cancer Mother      throat    Heart disease Father      heart attack       ALLERGIES AND MEDICATIONS: updated and reviewed.  Review of patient's allergies indicates:  No Known Allergies  Current Outpatient Prescriptions   Medication Sig Dispense Refill    albuterol (PROAIR HFA) 90 mcg/actuation inhaler Inhale 2 puffs into the lungs every 6 (six) hours as needed for Wheezing. 3 Inhaler 3    allopurinol (ZYLOPRIM)  300 MG tablet TAKE 1 TABLET ONE TIME DAILY 90 tablet 3    atorvastatin (LIPITOR) 40 MG tablet TAKE 1 TABLET EVERY DAY 90 tablet 3    blood sugar diagnostic (BLOOD GLUCOSE TEST) [facility] Accu- Check  Smartview Test Strips. Test  strip 11    glipiZIDE (GLUCOTROL) 5 MG tablet TAKE 1 TABLET TWICE DAILY BEFORE MEALS 180 tablet 3    lancets (ONETOUCH ULTRASOFT LANCETS) Misc Fastclix Lancets. Test  each 11    losartan (COZAAR) 25 MG tablet TAKE 1 TABLET EVERY DAY 90 tablet 3    meclizine (ANTIVERT) 25 mg tablet Take 1 tablet (25 mg total) by mouth 3 (three) times daily as needed for Dizziness. 90 tablet 0    metformin (GLUMETZA) 1000 MG (MOD) 24 hr tablet Take 1 tablet (1,000 mg total) by mouth once daily. 90 tablet 3    metoprolol succinate (TOPROL-XL) 50 MG 24 hr tablet TAKE 1 TABLET EVERY DAY 90 tablet 3    tiotropium (SPIRIVA) 18 mcg inhalation capsule Inhale 1 capsule (18 mcg total) into the lungs once daily. 90 capsule 3    aspirin (ECOTRIN) 81 MG EC tablet Take 1 tablet (81 mg total) by mouth once daily. (Patient taking differently: Take 162 mg by mouth once daily. ) 90 tablet 0    nitroGLYCERIN (NITROSTAT) 0.4 MG SL tablet Place 1 tablet (0.4 mg total) under the tongue every 5 (five) minutes as needed for Chest pain. 25 tablet 11     No current facility-administered medications for this visit.          ROS  Review of Systems   Constitutional: Negative for chills, fever and unexpected weight change.   HENT: Negative for congestion, dental problem, ear pain, hearing loss, rhinorrhea, sore throat and trouble swallowing.    Eyes: Negative for discharge, redness and visual disturbance.   Respiratory: Negative for cough, chest tightness, shortness of breath and wheezing.    Cardiovascular: Negative for chest pain, palpitations and leg swelling.   Gastrointestinal: Negative for abdominal pain, constipation, diarrhea, nausea and vomiting.   Endocrine: Negative for polydipsia, polyphagia and polyuria.  "  Genitourinary: Negative for decreased urine volume, dysuria and hematuria.   Musculoskeletal: Negative for arthralgias and myalgias.   Skin: Negative for color change and rash.   Neurological: Negative for dizziness, weakness, light-headedness and headaches.   Psychiatric/Behavioral: Negative for decreased concentration, dysphoric mood, sleep disturbance and suicidal ideas.           Physical Exam  Vitals:    08/24/17 0939 08/24/17 1037   BP: (!) 140/70 136/70   BP Location: Left arm    Patient Position: Sitting    BP Method: Large (Manual)    Pulse: 85    Temp: 97.9 °F (36.6 °C)    SpO2: 97%    Weight: 98.5 kg (217 lb 2.5 oz)    Height: 5' 4" (1.626 m)     Body mass index is 37.27 kg/m².  Weight: 98.5 kg (217 lb 2.5 oz)   Height: 5' 4" (162.6 cm)   General appearance: alert, appears stated age, cooperative and no distress  Head: Normocephalic, without obvious abnormality, atraumatic  Eyes: PERRL EOMI conjunctiva clear  Ears: normal TM's and external ear canals both ears  Nose: Nares normal. Septum midline. Mucosa normal. No drainage or sinus tenderness.  Throat: lips, mucosa, and tongue normal; teeth and gums normal  Neck: no adenopathy, no carotid bruit, no JVD, supple, symmetrical, trachea midline and thyroid not enlarged, symmetric, no tenderness/mass/nodules  Back: symmetric, no curvature. ROM normal. No CVA tenderness.  Lungs: fibrotic changes noted in bibasilar region otherwise CTA bilaterally  Heart: regular rate and rhythm, no S3 or S4, no click and no rub  Abdomen: soft, non-tender; bowel sounds normal; no masses,  no organomegaly  Extremities: extremities normal, atraumatic, no cyanosis or edema  Pulses: 2+ and symmetric  Neurologic: Mental status: Alert, oriented, thought content appropriate  Sensory: normal  Motor:grossly normal  Coordination: normal  Gait: Normal   Symmetric facial movements palate elevated symmetrically tongue midline         Health Maintenance       Date Due Completion Date    " TETANUS VACCINE 05/31/1960 ---    Zoster Vaccine 05/31/2002 ---    Influenza Vaccine 08/01/2017 10/28/2013    Hemoglobin A1c 02/10/2018 8/10/2017    Foot Exam 06/30/2018 6/30/2017    Lipid Panel 08/10/2018 8/10/2017    Eye Exam 08/10/2018 8/10/2017    Override on 3/15/2016: Done ([facility]- Jas Cooper MD-No diabetic retinopathy detected-Comments: Cataracts. Patient should return for re-evaluation in 12 months.)    Override on 11/20/2014: Done (cataracts.  No DM change)    Colonoscopy 01/24/2023 1/24/2013            Assessment & Plan  Problem List Items Addressed This Visit        Pulmonary    COPD (chronic obstructive pulmonary disease) (Chronic)    Current Assessment & Plan     The current medical regimen is effective;  continue present plan and medications.          Pulmonary asbestosis (Chronic)    Current Assessment & Plan     Followed by Dr. Olea in [place].  Monitor            Cardiac/Vascular    Essential hypertension, benign (Chronic)    Current Assessment & Plan     The current medical regimen is effective;  continue present plan and medications.          Mixed hyperlipidemia (Chronic)    Current Assessment & Plan     The current medical regimen is effective;  continue present plan and medications.             Endocrine    Type 2 diabetes mellitus with stage 3 chronic kidney disease, without long-term current use of insulin (Chronic)    Current Assessment & Plan     Increase glipizide to 10mg in the AM and 5mg in the PM.  Counseled on risks for hypoglycemia.  Continue metformin.  Recheck in 3 months         Relevant Orders    Comprehensive metabolic panel    Hemoglobin A1c    Basic metabolic panel       Orthopedic    Gout, chronic (Chronic)    Current Assessment & Plan     Recheck uric acid.          Relevant Orders    Uric acid      Other Visit Diagnoses     Routine medical exam    -  Primary  -    Discussed healthy diet, regular exercise, necessary labs, age appropriate cancer  screening, and routine vaccinations.               Health Maintenance reviewed, as above.  Flu in Oct.    Follow-up: Return in about 3 months (around 11/24/2017) for DM follow up.

## 2017-08-24 NOTE — ASSESSMENT & PLAN NOTE
Increase glipizide to 10mg in the AM and 5mg in the PM.  Counseled on risks for hypoglycemia.  Continue metformin.  Recheck in 3 months

## 2017-08-25 ENCOUNTER — TELEPHONE (OUTPATIENT)
Dept: FAMILY MEDICINE | Facility: CLINIC | Age: 77
End: 2017-08-25

## 2017-08-25 NOTE — TELEPHONE ENCOUNTER
Please call the patient and inform him that the gout levels look good but his kidney function has decreased some.  It is ok for him to continue his same medications but we will need to keep a close eye on this.      Thank you,  Erik

## 2017-11-21 ENCOUNTER — LAB VISIT (OUTPATIENT)
Dept: LAB | Facility: HOSPITAL | Age: 77
End: 2017-11-21
Attending: INTERNAL MEDICINE
Payer: MEDICARE

## 2017-11-21 DIAGNOSIS — N18.30 TYPE 2 DIABETES MELLITUS WITH STAGE 3 CHRONIC KIDNEY DISEASE: Primary | ICD-10-CM

## 2017-11-21 DIAGNOSIS — E11.22 TYPE 2 DIABETES MELLITUS WITH STAGE 3 CHRONIC KIDNEY DISEASE: Primary | ICD-10-CM

## 2017-11-21 LAB
ANION GAP SERPL CALC-SCNC: 9 MMOL/L
BUN SERPL-MCNC: 24 MG/DL
CALCIUM SERPL-MCNC: 9.8 MG/DL
CHLORIDE SERPL-SCNC: 105 MMOL/L
CO2 SERPL-SCNC: 24 MMOL/L
CREAT SERPL-MCNC: 1.4 MG/DL
EST. GFR  (AFRICAN AMERICAN): 56 ML/MIN/1.73 M^2
EST. GFR  (NON AFRICAN AMERICAN): 49 ML/MIN/1.73 M^2
ESTIMATED AVG GLUCOSE: 232 MG/DL
GLUCOSE SERPL-MCNC: 202 MG/DL
HBA1C MFR BLD HPLC: 9.7 %
POTASSIUM SERPL-SCNC: 4.7 MMOL/L
SODIUM SERPL-SCNC: 138 MMOL/L

## 2017-11-21 PROCEDURE — 80048 BASIC METABOLIC PNL TOTAL CA: CPT

## 2017-11-21 PROCEDURE — 36415 COLL VENOUS BLD VENIPUNCTURE: CPT

## 2017-11-21 PROCEDURE — 83036 HEMOGLOBIN GLYCOSYLATED A1C: CPT

## 2017-11-27 ENCOUNTER — TELEPHONE (OUTPATIENT)
Dept: FAMILY MEDICINE | Facility: CLINIC | Age: 77
End: 2017-11-27

## 2017-11-27 ENCOUNTER — LAB VISIT (OUTPATIENT)
Dept: LAB | Facility: HOSPITAL | Age: 77
End: 2017-11-27
Attending: INTERNAL MEDICINE
Payer: MEDICARE

## 2017-11-27 ENCOUNTER — OFFICE VISIT (OUTPATIENT)
Dept: FAMILY MEDICINE | Facility: CLINIC | Age: 77
End: 2017-11-27
Payer: MEDICARE

## 2017-11-27 VITALS
HEIGHT: 67 IN | BODY MASS INDEX: 34.01 KG/M2 | OXYGEN SATURATION: 96 % | DIASTOLIC BLOOD PRESSURE: 72 MMHG | SYSTOLIC BLOOD PRESSURE: 134 MMHG | HEART RATE: 80 BPM | WEIGHT: 216.69 LBS | TEMPERATURE: 98 F

## 2017-11-27 DIAGNOSIS — R10.9 RIGHT SIDED ABDOMINAL PAIN: ICD-10-CM

## 2017-11-27 DIAGNOSIS — N18.30 TYPE 2 DIABETES MELLITUS WITH STAGE 3 CHRONIC KIDNEY DISEASE, WITHOUT LONG-TERM CURRENT USE OF INSULIN: Primary | Chronic | ICD-10-CM

## 2017-11-27 DIAGNOSIS — I10 ESSENTIAL HYPERTENSION, BENIGN: Chronic | ICD-10-CM

## 2017-11-27 DIAGNOSIS — Z23 FLU VACCINE NEED: ICD-10-CM

## 2017-11-27 DIAGNOSIS — E11.22 TYPE 2 DIABETES MELLITUS WITH STAGE 3 CHRONIC KIDNEY DISEASE, WITHOUT LONG-TERM CURRENT USE OF INSULIN: Primary | Chronic | ICD-10-CM

## 2017-11-27 LAB
BILIRUB UR QL STRIP: NEGATIVE
CLARITY UR REFRACT.AUTO: CLEAR
COLOR UR AUTO: YELLOW
GLUCOSE UR QL STRIP: ABNORMAL
HGB UR QL STRIP: NEGATIVE
KETONES UR QL STRIP: NEGATIVE
LEUKOCYTE ESTERASE UR QL STRIP: NEGATIVE
NITRITE UR QL STRIP: NEGATIVE
PH UR STRIP: 5 [PH] (ref 5–8)
PROT UR QL STRIP: NEGATIVE
SP GR UR STRIP: 1.02 (ref 1–1.03)
URN SPEC COLLECT METH UR: ABNORMAL
UROBILINOGEN UR STRIP-ACNC: NEGATIVE EU/DL

## 2017-11-27 PROCEDURE — 99999 PR PBB SHADOW E&M-EST. PATIENT-LVL III: CPT | Mod: PBBFAC,,, | Performed by: INTERNAL MEDICINE

## 2017-11-27 PROCEDURE — G0008 ADMIN INFLUENZA VIRUS VAC: HCPCS | Mod: S$GLB,,, | Performed by: INTERNAL MEDICINE

## 2017-11-27 PROCEDURE — 99214 OFFICE O/P EST MOD 30 MIN: CPT | Mod: S$GLB,,, | Performed by: INTERNAL MEDICINE

## 2017-11-27 PROCEDURE — 81003 URINALYSIS AUTO W/O SCOPE: CPT

## 2017-11-27 PROCEDURE — 99499 UNLISTED E&M SERVICE: CPT | Mod: S$GLB,,, | Performed by: INTERNAL MEDICINE

## 2017-11-27 PROCEDURE — 90662 IIV NO PRSV INCREASED AG IM: CPT | Mod: S$GLB,,, | Performed by: INTERNAL MEDICINE

## 2017-11-27 NOTE — PROGRESS NOTES
Assessment & Plan  Problem List Items Addressed This Visit        Cardiac/Vascular    Essential hypertension, benign (Chronic)    Current Assessment & Plan     The current medical regimen is effective;  continue present plan and medications.             Endocrine    Type 2 diabetes mellitus with stage 3 chronic kidney disease, without long-term current use of insulin - Primary (Chronic)    Current Assessment & Plan     Restart metformin.  May also need to change glipizide to BID.  Will get him signed up for digital DM program.           Relevant Orders    Diabetes Digital Medicine (DDMP): Assign Onboarding Questionnaires (Completed)    Diabetes Digital Medicine (DDMP) Enrollment Order (Completed)    Hemoglobin A1c    Basic metabolic panel      Other Visit Diagnoses     Flu vaccine need    -  vaccinate    Relevant Orders    Influenza - High Dose (65+) (PF) (IM) (Completed)    Right sided abdominal pain    -  Check UA.  Suspect pulled muscle but will check UA for other causes.     Relevant Orders    Urinalysis            Health Maintenance reviewed, as above.    Follow-up: Return in about 3 months (around 2/27/2018) for DM follow up.    ______________________________________________________________________    Chief Complaint  Chief Complaint   Patient presents with    Diabetes     f/u    Chronic Kidney Disease     f/u    Hypertension     f/u       HPI  James Mae . is a 77 y.o. male with multiple medical diagnoses as listed in the medical history and problem list that presents for HTN, DM, CKD follow up.  Pt is known to me with his last appointment 8/24/2017.  He had labs drawn prior to this visit that showed stable renal function.  A1c has increased to >9%.  He has been going through some extra stress with regards to having his wife placed in a SNF and now planning on bringing her back home with  and 24 hour care at home.  Daughter is helping with this as well.     He reports that he has been having  some right sided abdominal pain that started a couple of weeks ago.  It is associated with nausea.  Denies dysuria or hematuria but he is urinating more.  Seems to be triggered by movement. Denies renal colic symptoms, constipation, diarrhea, emesis.        PAST MEDICAL HISTORY:  Past Medical History:   Diagnosis Date    Asbestosis     COPD with asthma     Coronary artery disease     Diabetes mellitus type II     Hypertension        PAST SURGICAL HISTORY:  Past Surgical History:   Procedure Laterality Date    APPENDECTOMY      8 yrs ago    CHOLECYSTECTOMY      8 yrs ago    CORONARY STENT PLACEMENT      SMALL INTESTINE SURGERY      8 yrs       SOCIAL HISTORY:  Social History     Social History    Marital status:      Spouse name: N/A    Number of children: N/A    Years of education: N/A     Occupational History    Not on file.     Social History Main Topics    Smoking status: Never Smoker    Smokeless tobacco: Never Used    Alcohol use No    Drug use: No    Sexual activity: Yes     Partners: Female     Other Topics Concern    Not on file     Social History Narrative    No narrative on file       FAMILY HISTORY:  Family History   Problem Relation Age of Onset    Cancer Mother      throat    Heart disease Father      heart attack       ALLERGIES AND MEDICATIONS: updated and reviewed.  Review of patient's allergies indicates:  No Known Allergies  Current Outpatient Prescriptions   Medication Sig Dispense Refill    albuterol (PROAIR HFA) 90 mcg/actuation inhaler Inhale 2 puffs into the lungs every 6 (six) hours as needed for Wheezing. 3 Inhaler 3    allopurinol (ZYLOPRIM) 300 MG tablet TAKE 1 TABLET ONE TIME DAILY 90 tablet 3    atorvastatin (LIPITOR) 40 MG tablet TAKE 1 TABLET EVERY DAY 90 tablet 3    blood sugar diagnostic (BLOOD GLUCOSE TEST) [facility] Accu- Check  Smartview Test Strips. Test  strip 11    glipiZIDE (GLUCOTROL) 5 MG tablet TAKE 1 TABLET TWICE DAILY BEFORE MEALS 180  tablet 3    lancets (ONETOUCH ULTRASOFT LANCETS) Misc Fastclix Lancets. Test  each 11    losartan (COZAAR) 25 MG tablet TAKE 1 TABLET EVERY DAY 90 tablet 3    meclizine (ANTIVERT) 25 mg tablet Take 1 tablet (25 mg total) by mouth 3 (three) times daily as needed for Dizziness. 90 tablet 0    metformin (GLUMETZA) 1000 MG (MOD) 24 hr tablet Take 1 tablet (1,000 mg total) by mouth once daily. 90 tablet 3    metoprolol succinate (TOPROL-XL) 50 MG 24 hr tablet TAKE 1 TABLET EVERY DAY 90 tablet 3    tiotropium (SPIRIVA) 18 mcg inhalation capsule Inhale 1 capsule (18 mcg total) into the lungs once daily. 90 capsule 3    aspirin (ECOTRIN) 81 MG EC tablet Take 1 tablet (81 mg total) by mouth once daily. (Patient taking differently: Take 162 mg by mouth once daily. ) 90 tablet 0    nitroGLYCERIN (NITROSTAT) 0.4 MG SL tablet Place 1 tablet (0.4 mg total) under the tongue every 5 (five) minutes as needed for Chest pain. 25 tablet 11     No current facility-administered medications for this visit.          ROS  Review of Systems   Constitutional: Negative for chills, fever and unexpected weight change.   HENT: Negative for congestion, dental problem, ear pain, hearing loss, rhinorrhea, sore throat and trouble swallowing.    Eyes: Negative for discharge, redness and visual disturbance.   Respiratory: Negative for cough, chest tightness, shortness of breath and wheezing.    Cardiovascular: Negative for chest pain, palpitations and leg swelling.   Gastrointestinal: Positive for abdominal pain and nausea. Negative for constipation, diarrhea and vomiting.   Endocrine: Negative for polydipsia, polyphagia and polyuria.   Genitourinary: Positive for frequency. Negative for decreased urine volume, dysuria and hematuria.   Musculoskeletal: Negative for arthralgias and myalgias.   Skin: Negative for color change and rash.   Neurological: Negative for dizziness, weakness, light-headedness and headaches.  "  Psychiatric/Behavioral: Positive for sleep disturbance (h/o shift work). Negative for decreased concentration, dysphoric mood and suicidal ideas.           Physical Exam  Vitals:    11/27/17 0953 11/27/17 1030   BP: (!) 140/80 134/72   Pulse: 80    Temp: 98.3 °F (36.8 °C)    SpO2: 96%    Weight: 98.3 kg (216 lb 11.4 oz)    Height: 5' 7" (1.702 m)     Body mass index is 33.94 kg/m².  Weight: 98.3 kg (216 lb 11.4 oz)   Height: 5' 7" (170.2 cm)   Physical Exam   Constitutional: He is oriented to person, place, and time. He appears well-developed and well-nourished. No distress.   HENT:   Head: Normocephalic and atraumatic.   Eyes: Conjunctivae, EOM and lids are normal. Pupils are equal, round, and reactive to light. No scleral icterus.   Neck: Full passive range of motion without pain. Neck supple. No JVD present. Carotid bruit is not present. No thyromegaly present.   Cardiovascular: Normal rate, regular rhythm, normal heart sounds and intact distal pulses.  Exam reveals no S3, no S4 and no friction rub.    No murmur heard.  Pulmonary/Chest: Effort normal and breath sounds normal. He has no wheezes. He has no rhonchi. He has no rales.   Abdominal: Soft. Bowel sounds are normal. He exhibits no mass. There is tenderness (mild right sided abdominal pain in arabella anterior axillary line). There is no rebound and no guarding. No hernia.   Musculoskeletal: He exhibits no edema or tenderness.   Lymphadenopathy:        Head (right side): No submental and no submandibular adenopathy present.        Head (left side): No submental and no submandibular adenopathy present.     He has no cervical adenopathy.        Right: No supraclavicular adenopathy present.        Left: No supraclavicular adenopathy present.   Neurological: He is alert and oriented to person, place, and time.   Motor grossly intact.  Sensory grossly intact.  Symmetric facial movements palate elevated symmetrically tongue midline   Skin: Skin is warm and dry. No " rash noted.   Psychiatric: He has a normal mood and affect. His speech is normal and behavior is normal. Thought content normal.         Health Maintenance       Date Due Completion Date    TETANUS VACCINE 05/31/1960 ---    Zoster Vaccine 05/31/2002 ---    Influenza Vaccine 08/01/2017 10/28/2013    Hemoglobin A1c 05/21/2018 11/21/2017    Foot Exam 06/30/2018 6/30/2017    Lipid Panel 08/10/2018 8/10/2017    Eye Exam 08/10/2018 8/10/2017    Override on 3/15/2016: Done ([facility]- Jas Cooper MD-No diabetic retinopathy detected-Comments: Cataracts. Patient should return for re-evaluation in 12 months.)    Override on 11/20/2014: Done (cataracts.  No DM change)    Colonoscopy 01/24/2023 1/24/2013

## 2017-11-27 NOTE — TELEPHONE ENCOUNTER
Please inform the patient that his urine did not show signs of a kidney stone.  It appears that his frequency is due to there being sugar in his urine.      Thank you,  Erik

## 2017-11-27 NOTE — PROGRESS NOTES
Patient tolerated Flu vaccination well. VIS given to patient. Patient instructed to wait 15 min before leaving. Patient verbalized understanding.

## 2017-11-27 NOTE — ASSESSMENT & PLAN NOTE
Restart metformin.  May also need to change glipizide to BID.  Will get him signed up for digital DM program.

## 2017-12-01 ENCOUNTER — PATIENT MESSAGE (OUTPATIENT)
Dept: ADMINISTRATIVE | Facility: OTHER | Age: 77
End: 2017-12-01

## 2017-12-01 DIAGNOSIS — E11.22 TYPE 2 DIABETES MELLITUS WITH STAGE 3 CHRONIC KIDNEY DISEASE, WITHOUT LONG-TERM CURRENT USE OF INSULIN: Primary | Chronic | ICD-10-CM

## 2017-12-01 DIAGNOSIS — N18.30 TYPE 2 DIABETES MELLITUS WITH STAGE 3 CHRONIC KIDNEY DISEASE, WITHOUT LONG-TERM CURRENT USE OF INSULIN: Primary | Chronic | ICD-10-CM

## 2017-12-05 ENCOUNTER — PATIENT OUTREACH (OUTPATIENT)
Dept: OTHER | Facility: OTHER | Age: 77
End: 2017-12-05

## 2017-12-05 DIAGNOSIS — N18.30 TYPE 2 DIABETES MELLITUS WITH STAGE 3 CHRONIC KIDNEY DISEASE, WITHOUT LONG-TERM CURRENT USE OF INSULIN: Primary | ICD-10-CM

## 2017-12-05 DIAGNOSIS — E11.22 TYPE 2 DIABETES MELLITUS WITH STAGE 3 CHRONIC KIDNEY DISEASE, WITHOUT LONG-TERM CURRENT USE OF INSULIN: Primary | ICD-10-CM

## 2017-12-05 NOTE — TELEPHONE ENCOUNTER
DM Digital Medicine Program Medication Reconciliation Outreach    Called patient to introduce him into the DDMP. Reviewed program details including blood glucose / HgbA1C monitoring, meal times, and what to do in case of emergency. Introduced patient to members of care team (health , clinician, and responsible provider). Verified patient's understanding of [facility] My[facility] josefina use for contacting clinical team and to ensure that glucometer readings continue to transmit by logging in once every 2 weeks. Confirmation text sent and patient received.  Reviewed login to [employer] at least once per week and pt verbalized understanding.  Pt has been taking 1 BG reading daily, though he stated that Dr. Bee has advised that he take 2 readings per day.  Pt states he will start doing so soon, but has just gotten his wife home from the Nursing Home.  Pt wife has dementia and he and his daughter are primary caregivers.  Reports compliance with BG medications- has been taking Glipizide 5 mg BID + Metformin 1000 mg in AM.  Pt acknowledges that he will need some help getting used to DM diet and learning what types of foods to avoid / cut back on.  There is a discrepancy regarding his inhalers he has been using at home as he states he does not know where his SPiriva inhaler is, but has been using a Breo Ellipta inhaler qDay that his daughter had given him. Advised that he should speak to MD regarding his inhaler regimen to ensure that what he is using is adequate and is updated in his chart.  Will follow up regarding referral for sleep study at a later date. No issues r/t BG at this time and does not have any further questions at this time.    Home glucose monitoring: is performed regularly  Last A1C / goal:   Routine Diabetic Testing Smartset ordered: yes      Screening Questionnaire Review:  1. Depression - not indicated    SIMEON screening was indicated and pt reports having had snoring, waking up in middle of the night d/t  frequent urination + trouble breathing. Would like referral for sleep clinic study at a later date once things settle down at home.      Verified the following information with the patient:  1. Medication list  Current Outpatient Prescriptions on File Prior to Visit   Medication Sig Dispense Refill    albuterol (PROAIR HFA) 90 mcg/actuation inhaler Inhale 2 puffs into the lungs every 6 (six) hours as needed for Wheezing. 3 Inhaler 3    allopurinol (ZYLOPRIM) 300 MG tablet TAKE 1 TABLET ONE TIME DAILY 90 tablet 3    aspirin (ECOTRIN) 81 MG EC tablet Take 1 tablet (81 mg total) by mouth once daily. (Patient taking differently: Take 162 mg by mouth once daily. ) 90 tablet 0    atorvastatin (LIPITOR) 40 MG tablet TAKE 1 TABLET EVERY DAY 90 tablet 3    blood sugar diagnostic (BLOOD GLUCOSE TEST) RUST Accu- Check  Smartview Test Strips. Test  strip 11    glipiZIDE (GLUCOTROL) 5 MG tablet TAKE 1 TABLET TWICE DAILY BEFORE MEALS 180 tablet 3    lancets (ONETOUCH ULTRASOFT LANCETS) Misc Fastclix Lancets. Test  each 11    losartan (COZAAR) 25 MG tablet TAKE 1 TABLET EVERY DAY 90 tablet 3    meclizine (ANTIVERT) 25 mg tablet Take 1 tablet (25 mg total) by mouth 3 (three) times daily as needed for Dizziness. 90 tablet 0    metformin (GLUMETZA) 1000 MG (MOD) 24 hr tablet Take 1 tablet (1,000 mg total) by mouth once daily. 90 tablet 3    metoprolol succinate (TOPROL-XL) 50 MG 24 hr tablet TAKE 1 TABLET EVERY DAY 90 tablet 3    nitroGLYCERIN (NITROSTAT) 0.4 MG SL tablet Place 1 tablet (0.4 mg total) under the tongue every 5 (five) minutes as needed for Chest pain. 25 tablet 11    [DISCONTINUED] tiotropium (SPIRIVA) 18 mcg inhalation capsule Inhale 1 capsule (18 mcg total) into the lungs once daily. 90 capsule 3     No current facility-administered medications on file prior to visit.        Previous ADRs      2. Medication compliance: has been compliant with the medicaiton regimen    3. Medication  Allergies: Review of patient's allergies indicates:  No Known Allergies

## 2017-12-05 NOTE — LETTER
Crystal Wilson, PharmD  [address] [place] [address]     Dear James Mae Jr.,    Welcome to the [facility] Diabetes Digital Medicine Program!         My name is Crystal Wilson, PharmD, and I am your dedicated Diabetes Digital Medicine clinician. As an expert in medication management, I will help ensure that the medications you are taking continue to provide the intended benefits and help you reach your diabetes goals.      I am Ana Rosa Cason,  and I will be your health  for the duration of the program. My job is to help you identify lifestyle changes to improve your diabetes control. We will talk about nutrition, exercise, and other ways you may be able to adjust your current habits to better your health.     Additionally, we will help ensure you are completing the tests and screenings that are necessary to help manage your diabetes.    Together, we will work to improve your overall health and encourage you to meet your goals for a healthier lifestyle.     What we expect from YOU:    If you test your blood sugar at home, you should take home measurements according to the frequency your physician and Digital Medicine care team specifies.   Be available to receive phone calls or [facility] messages when appropriate from your care team.   Complete routine tests and screenings (such as Hemoglobin A1c testing, eye exams, and Nephropathy assessment). Dont worry, we will help keep you on track!    What you should expect from your Digital Medicine Care Team:   We will provide you with education about diabetes, including lifestyle changes that could help you manage your diabetes.   We will adjust your current medications, if needed, and continue monitoring your progress in the long term.   We will provide you and your physician with monthly progress reports after you have been in the program for more than 30 days.   We will send you reminders through [facility] and text  messages to help ensure you do not miss any testing deadlines.    We want to make you get important tests and screenings done regularly to assure that your health needs are met. We have put a new system in place, called CareTouch that will help us improve how we monitor and reach out to you about the following lab tests that you will need to help manage your diabetes.    Hemoglobin A1c testing (Frequency: Every 3 to 6 months, dependent on A1c goal).   Nephropathy Assessment, generally urine micro albumin testing (Frequency: Yearly)    When necessary you can come in to one of the labs on the attached page any weekday between 10:30 am and 4:00 pm to have your tests done prior to their due date. Tell the  you received a CareTouch letter, or just look for the CareTouch sign.    You will be able to reach me by phone at [phone] or through your [facility] account by clicking my name under Care Team on the right side of the home screen.    I look forward to working with you to help manage your Diabetes!    Sincerely,    Crystal Wilson, PharmD  Your personal clinician    Please visit [email or web address] to learn more about high blood pressure and what you can do lower your blood pressure.                                                       James Mae Jr.  [address] [place] [address]

## 2017-12-11 ENCOUNTER — PATIENT OUTREACH (OUTPATIENT)
Dept: OTHER | Facility: OTHER | Age: 77
End: 2017-12-11

## 2017-12-11 NOTE — PROGRESS NOTES
Diabetes Digital Medicine Program (DDMP): Health  Follow Up    Hypertension Digital Medicine Program (HDMP): Health  Introduction    Introduced Mr. James Reis Tu Sotomayor to DDMP. Discussed health  role and recommended lifestyle modifications.    Diet (i.e. Low sodium, food labels): Patient explained that he does the cooking in his home but that he cooks a lot of cajun style dishes. He and his wife have been trying to make some changes in their diet but he needs assistance because he does not know what changes to make in his current meals he cooks to help lower his BS. We are going to start by sending him a meal plan that he can look at to give him ideas of lower carb meal options to see if he can incorporate some of those things in his current diet. From there, he will provide me with some of the meals he is cooking the things he uses to cook them so I can help make some adjustments.   Exercise: We will discuss this on our next call.  Medication Adherence: has been compliant with the medicaiton regimen    Patient was able to talk but had some things going on at his home so we did not go into too much depth at this time. We will continue to work on lifestyle changes over the next few weeks.   The reading from 12/14 was after eating a lot of sweets from grieving the loss of a family member.  His other readings are taken before eating meals. I explained that we will work to get his fast BS readings below 120 and he expressed understanding.     Faxed resources to patient per patient request to [phone].    Patient is aware of the importance of taking daily BS readings at various times of the day  Patient aware that the health  can be used as a resource for lifestyle modifications to help reduce or maintain a healthy BS  Patient is aware of the importance of medication adherence.  Patient is aware that DDMP team is not available for emergencies. Patient should call 911 or [facility] on [facility] if one  arises.

## 2017-12-19 ENCOUNTER — PATIENT OUTREACH (OUTPATIENT)
Dept: OTHER | Facility: OTHER | Age: 77
End: 2017-12-19

## 2017-12-19 NOTE — PROGRESS NOTES
"Diabetes Medications             glipiZIDE (GLUCOTROL) 5 MG tablet TAKE 1 TABLET TWICE DAILY BEFORE MEALS    metformin (GLUMETZA) 1000 MG (MOD) 24 hr tablet Take 1 tablet (1,000 mg total) by mouth once daily.        Plan:   Called to welcome patient to the DDMP (Diabetes Digital Medicine Program).  Reviewed my role in the program, to monitor BG readings and manage DM medications if needed.   Per AACE/ACE guidelines (goal A1C < 8%), DM is uncontrolled (A1C on 11/21/2017 was 9.7%).    Patient states he recently restarted metformin er 1000mg, about 1 week ago. Patient states 61 taken on 12/16 was an error, states he did not have enough blood on the test strip.  When he retook his BG, his reading was 201.    Patient plans to return to a "normal" routine tomorrow, will no longer have to drive granddaughter to and from [place] daily.   Patient denies having questions or concerns. Instructed patient to call if he has any questions or concerns, patient confirms understanding.   Will continue to monitor. WCB in 1 month.         "

## 2017-12-26 ENCOUNTER — PATIENT OUTREACH (OUTPATIENT)
Dept: OTHER | Facility: OTHER | Age: 77
End: 2017-12-26

## 2017-12-26 NOTE — PROGRESS NOTES
Hypertension Medications             losartan (COZAAR) 25 MG tablet TAKE 1 TABLET EVERY DAY    metoprolol succinate (TOPROL-XL) 50 MG 24 hr tablet TAKE 1 TABLET EVERY DAY    nitroGLYCERIN (NITROSTAT) 0.4 MG SL tablet Place 1 tablet (0.4 mg total) under the tongue every 5 (five) minutes as needed for Chest pain.        Assessment/ Plan:   Called patient to follow up regarding the DDMP (Diabetes Digital Medicine Program).   Patient states he is having trouble with digital glucometer. Patient states his readings this am, 54 and 87, were errors. Patient states he retook his BG with another glucometer and his reading was  ~140. Patient is scheduled to see PCP today and plans to stop by the [facility] for digital glucometer assistance.   Per AACE/ACE guidelines (goal A1C < 8%), DM is well controlled (A1C on 11/21 was 9.7%).  Patient denies having questions or concerns. Instructed patient to call if he has any questions or concerns, patient confirms understanding.   Will continue to monitor. WCB in 3 weeks.

## 2018-01-15 ENCOUNTER — PATIENT OUTREACH (OUTPATIENT)
Dept: OTHER | Facility: OTHER | Age: 78
End: 2018-01-15

## 2018-01-15 DIAGNOSIS — E11.22 TYPE 2 DIABETES MELLITUS WITH STAGE 3 CHRONIC KIDNEY DISEASE, WITHOUT LONG-TERM CURRENT USE OF INSULIN: Primary | ICD-10-CM

## 2018-01-15 DIAGNOSIS — N18.30 TYPE 2 DIABETES MELLITUS WITH STAGE 3 CHRONIC KIDNEY DISEASE, WITHOUT LONG-TERM CURRENT USE OF INSULIN: Primary | ICD-10-CM

## 2018-01-15 NOTE — PROGRESS NOTES
Last 6 Patient Entered Readings                                          Most Recent A1c: 9.7% (Goal: 8%)     Recent Readings 1/12/2018 1/11/2018 1/10/2018 1/9/2018 1/7/2018    Blood Glucose (mg/dL) 153 141 145 112 238          Called patient to follow up about his diabetes and BS readings. He admits to eating sweets but stated that he is starting to cut back more then ever. He did receive all of the resources that I sent him last month to get him start with following a low carb diet. He just has not had time to focus on that yet. He informed me that his wife has been ill and having problems with forgetfulness and other medical issues. He is at the store now picking up medication that her nurse recommended. He will call me back once things settle down and if I don't hear from him, I will reach out in a few weeks.

## 2018-01-17 ENCOUNTER — PATIENT OUTREACH (OUTPATIENT)
Dept: OTHER | Facility: OTHER | Age: 78
End: 2018-01-17

## 2018-01-17 DIAGNOSIS — N18.30 TYPE 2 DIABETES MELLITUS WITH STAGE 3 CHRONIC KIDNEY DISEASE, WITHOUT LONG-TERM CURRENT USE OF INSULIN: Primary | Chronic | ICD-10-CM

## 2018-01-17 DIAGNOSIS — E11.22 TYPE 2 DIABETES MELLITUS WITH STAGE 3 CHRONIC KIDNEY DISEASE, WITHOUT LONG-TERM CURRENT USE OF INSULIN: Primary | Chronic | ICD-10-CM

## 2018-01-17 NOTE — PROGRESS NOTES
Last 6 Patient Entered Readings                                          Most Recent A1c: 9.7% (Goal: 8%)     Recent Readings 1/16/2018 1/12/2018 1/11/2018 1/10/2018 1/9/2018    Blood Glucose (mg/dL) 110 153 141 145 112        Diabetes Medications             glipiZIDE (GLUCOTROL) 5 MG tablet TAKE 1 TABLET TWICE DAILY BEFORE MEALS    metformin (GLUMETZA) 1000 MG (MOD) 24 hr tablet Take 1 tablet (1,000 mg total) by mouth once daily.        Plan:   1/17- Called to follow up with patient. Per AACE/ACE guidelines (goal A1C < 8%), DM is uncontrolled (A1C on 11/21/17 was 9.7%).  LVM, requested patient call back at his convenience.  Will continue to monitor. WCB in 1 month.   Patient is scheduled to have A1C drawn again on 2/20/18 and a f/u with PCP on 2/27.     2/16- Called to follow up with patient. Per AACE/ACE guidelines (goal A1C < 8%), DM is uncontrolled (A1C on 11/21/17 was 9.7%).  Patient has not taken reading since 1/16.  Patient states he is having trouble with digital glucometer, patient states he plans to go to the [facility] on 2/19.   Patient states fasting BG this am ~230.  Patient is scheduled to have A1C drawn again on 2/20/18 and a f/u with PCP on 2/27.       3/5- Patient still has not taken any digital BG readings since 1/16. Patient spoke to HC Ana Rosa Cason on 3/1, planned to go to the [facility] on 3/2. Will continue to monitor and WCB in 2 weeks.

## 2018-02-02 ENCOUNTER — PATIENT OUTREACH (OUTPATIENT)
Dept: OTHER | Facility: OTHER | Age: 78
End: 2018-02-02

## 2018-02-02 NOTE — PROGRESS NOTES
Last 6 Patient Entered Readings                                          Most Recent A1c: 9.7% (Goal: 8%)     Recent Readings 1/16/2018 1/12/2018 1/11/2018 1/10/2018 1/9/2018    Blood Glucose (mg/dL) 110 153 141 145 112          Diabetes Digital Medicine (DDMP) Health  Follow Up    LVM to follow up with . James Chato Mae Jr..    DM is still uncontrolled. I wanted to follow up more about his low carb diet. He stated that he has gotten better with laying off the sweets but he needs more education in general. Encouraged adherence to low carbdiet and physical activity guidelines. Requested patient call or message back so we can discuss his readings/obtain an update. Will continue to monitor/follow up.

## 2018-02-09 RX ORDER — METFORMIN HYDROCHLORIDE 1000 MG/1
1000 TABLET, FILM COATED, EXTENDED RELEASE ORAL DAILY
Qty: 90 TABLET | Refills: 0 | Status: SHIPPED | OUTPATIENT
Start: 2018-02-09 | End: 2018-02-27 | Stop reason: SDUPTHER

## 2018-02-20 ENCOUNTER — LAB VISIT (OUTPATIENT)
Dept: LAB | Facility: HOSPITAL | Age: 78
End: 2018-02-20
Attending: INTERNAL MEDICINE
Payer: MEDICARE

## 2018-02-20 DIAGNOSIS — E11.22 TYPE 2 DIABETES MELLITUS WITH STAGE 3 CHRONIC KIDNEY DISEASE, WITHOUT LONG-TERM CURRENT USE OF INSULIN: Chronic | ICD-10-CM

## 2018-02-20 DIAGNOSIS — N18.30 TYPE 2 DIABETES MELLITUS WITH STAGE 3 CHRONIC KIDNEY DISEASE, WITHOUT LONG-TERM CURRENT USE OF INSULIN: Chronic | ICD-10-CM

## 2018-02-20 LAB
ANION GAP SERPL CALC-SCNC: 10 MMOL/L
BUN SERPL-MCNC: 28 MG/DL
CALCIUM SERPL-MCNC: 9 MG/DL
CHLORIDE SERPL-SCNC: 108 MMOL/L
CO2 SERPL-SCNC: 21 MMOL/L
CREAT SERPL-MCNC: 1.4 MG/DL
EST. GFR  (AFRICAN AMERICAN): 56.4 ML/MIN/1.73 M^2
EST. GFR  (NON AFRICAN AMERICAN): 48.8 ML/MIN/1.73 M^2
ESTIMATED AVG GLUCOSE: 209 MG/DL
GLUCOSE SERPL-MCNC: 127 MG/DL
HBA1C MFR BLD HPLC: 8.9 %
POTASSIUM SERPL-SCNC: 4.4 MMOL/L
SODIUM SERPL-SCNC: 139 MMOL/L

## 2018-02-20 PROCEDURE — 80048 BASIC METABOLIC PNL TOTAL CA: CPT

## 2018-02-20 PROCEDURE — 83036 HEMOGLOBIN GLYCOSYLATED A1C: CPT

## 2018-02-20 PROCEDURE — 36415 COLL VENOUS BLD VENIPUNCTURE: CPT | Mod: PO

## 2018-02-27 ENCOUNTER — OFFICE VISIT (OUTPATIENT)
Dept: FAMILY MEDICINE | Facility: CLINIC | Age: 78
End: 2018-02-27
Payer: MEDICARE

## 2018-02-27 ENCOUNTER — TELEPHONE (OUTPATIENT)
Dept: FAMILY MEDICINE | Facility: CLINIC | Age: 78
End: 2018-02-27

## 2018-02-27 VITALS
OXYGEN SATURATION: 95 % | DIASTOLIC BLOOD PRESSURE: 70 MMHG | BODY MASS INDEX: 33.37 KG/M2 | SYSTOLIC BLOOD PRESSURE: 130 MMHG | WEIGHT: 212.63 LBS | TEMPERATURE: 98 F | HEART RATE: 84 BPM | HEIGHT: 67 IN

## 2018-02-27 DIAGNOSIS — N18.30 TYPE 2 DIABETES MELLITUS WITH STAGE 3 CHRONIC KIDNEY DISEASE, WITHOUT LONG-TERM CURRENT USE OF INSULIN: Chronic | ICD-10-CM

## 2018-02-27 DIAGNOSIS — E11.22 TYPE 2 DIABETES MELLITUS WITH STAGE 3 CHRONIC KIDNEY DISEASE, WITHOUT LONG-TERM CURRENT USE OF INSULIN: Primary | Chronic | ICD-10-CM

## 2018-02-27 DIAGNOSIS — R53.83 CAREGIVER WITH FATIGUE: ICD-10-CM

## 2018-02-27 DIAGNOSIS — E11.22 TYPE 2 DIABETES MELLITUS WITH STAGE 3 CHRONIC KIDNEY DISEASE, WITHOUT LONG-TERM CURRENT USE OF INSULIN: Chronic | ICD-10-CM

## 2018-02-27 DIAGNOSIS — I10 ESSENTIAL HYPERTENSION, BENIGN: Chronic | ICD-10-CM

## 2018-02-27 DIAGNOSIS — J61 PULMONARY ASBESTOSIS: Chronic | ICD-10-CM

## 2018-02-27 DIAGNOSIS — N18.30 TYPE 2 DIABETES MELLITUS WITH STAGE 3 CHRONIC KIDNEY DISEASE, WITHOUT LONG-TERM CURRENT USE OF INSULIN: Primary | Chronic | ICD-10-CM

## 2018-02-27 DIAGNOSIS — I70.0 ATHEROSCLEROSIS OF AORTA: Chronic | ICD-10-CM

## 2018-02-27 PROCEDURE — 1159F MED LIST DOCD IN RCRD: CPT | Mod: S$GLB,,, | Performed by: INTERNAL MEDICINE

## 2018-02-27 PROCEDURE — 99499 UNLISTED E&M SERVICE: CPT | Mod: S$GLB,,, | Performed by: INTERNAL MEDICINE

## 2018-02-27 PROCEDURE — 1126F AMNT PAIN NOTED NONE PRSNT: CPT | Mod: S$GLB,,, | Performed by: INTERNAL MEDICINE

## 2018-02-27 PROCEDURE — 3008F BODY MASS INDEX DOCD: CPT | Mod: S$GLB,,, | Performed by: INTERNAL MEDICINE

## 2018-02-27 PROCEDURE — 99999 PR PBB SHADOW E&M-EST. PATIENT-LVL III: CPT | Mod: PBBFAC,,, | Performed by: INTERNAL MEDICINE

## 2018-02-27 PROCEDURE — 99214 OFFICE O/P EST MOD 30 MIN: CPT | Mod: S$GLB,,, | Performed by: INTERNAL MEDICINE

## 2018-02-27 RX ORDER — METFORMIN HYDROCHLORIDE 500 MG/1
1000 TABLET, EXTENDED RELEASE ORAL
Qty: 180 TABLET | Refills: 1 | Status: SHIPPED | OUTPATIENT
Start: 2018-02-27 | End: 2018-05-08 | Stop reason: SDUPTHER

## 2018-02-27 RX ORDER — METFORMIN HYDROCHLORIDE 1000 MG/1
1000 TABLET, FILM COATED, EXTENDED RELEASE ORAL DAILY
Qty: 90 TABLET | Refills: 1 | Status: SHIPPED | OUTPATIENT
Start: 2018-02-27 | End: 2018-02-27

## 2018-02-27 NOTE — TELEPHONE ENCOUNTER
----- Message from [place] Anne sent at 2/27/2018 10:25 AM CST -----  Contact: Antonino- [facility]  metFORMIN (GLUMETZA) 1000 MG (MOD) 24 is not covered on patient insurance. Does doctor want to use  plan Metformin?     Antonino can be reached at [phone].      thanks

## 2018-02-27 NOTE — ASSESSMENT & PLAN NOTE
Improving but still high. He is planning on making dietary changes.  Will get him back enrolled in digital DM.  May need to increase AM glipizide slightly.

## 2018-02-27 NOTE — PROGRESS NOTES
Assessment & Plan  Problem List Items Addressed This Visit        Pulmonary    Pulmonary asbestosis (Chronic)    Current Assessment & Plan     Stable.  Monitor             Cardiac/Vascular    Essential hypertension, benign (Chronic)    Atherosclerosis of aorta (Chronic)    Overview     CT chest April 2014.  Stable, asymptomatic chronic condition.  Will continue to maximize risk factor reduction and adjust medication as needed.             Endocrine    Type 2 diabetes mellitus with stage 3 chronic kidney disease, without long-term current use of insulin - Primary (Chronic)    Current Assessment & Plan     Improving but still high. He is planning on making dietary changes.  Will get him back enrolled in digital DM.  May need to increase AM glipizide slightly.          Relevant Medications    metFORMIN (GLUMETZA) 1000 MG (MOD) 24 hr tablet      Other Visit Diagnoses     Caregiver with fatigue   -    Counseled on options to help with this including taking breaks to care for himself of have some time alone.  We discussed the role or AIM and hospice.  We will also have our SW and CM talk to him.                 Health Maintenance reviewed, deferred.    Follow-up: Follow-up in about 3 months (around 5/27/2018) for DM follow up.    ______________________________________________________________________    Chief Complaint  Chief Complaint   Patient presents with    Diabetes    Follow-up       HPI  James Mae . is a 77 y.o. male with multiple medical diagnoses as listed in the medical history and problem list that presents for Diabetes follow up.  Pt is known to me with his last appointment 11/27/2017.  He had his labs done prior to this OV that showed a stable renal function and A1c that has reduced to 8.9% from 9.7%.      No hypoglycemic symptoms, CP, SOB, palpitations.  He had some issues with digital DM due to issues with his cell phone.  Am sugar today was 157.  He is taking and tolerating his medications.   Reports that he has been overeating somewhat.      He has been caring for his wife that has advancing dementia.  Feeling more fatigued.  Reports some issues with her medication sched.  She is also a patient of mine.        PAST MEDICAL HISTORY:  Past Medical History:   Diagnosis Date    Asbestosis     COPD with asthma     Coronary artery disease     Diabetes mellitus type II     Hypertension        PAST SURGICAL HISTORY:  Past Surgical History:   Procedure Laterality Date    APPENDECTOMY      8 yrs ago    CHOLECYSTECTOMY      8 yrs ago    CORONARY STENT PLACEMENT      SMALL INTESTINE SURGERY      8 yrs       SOCIAL HISTORY:  Social History     Social History    Marital status:      Spouse name: N/A    Number of children: N/A    Years of education: N/A     Occupational History    Not on file.     Social History Main Topics    Smoking status: Never Smoker    Smokeless tobacco: Never Used    Alcohol use No    Drug use: No    Sexual activity: Yes     Partners: Female     Other Topics Concern    Not on file     Social History Narrative    No narrative on file       FAMILY HISTORY:  Family History   Problem Relation Age of Onset    Cancer Mother      throat    Heart disease Father      heart attack       ALLERGIES AND MEDICATIONS: updated and reviewed.  Review of patient's allergies indicates:  No Known Allergies  Current Outpatient Prescriptions   Medication Sig Dispense Refill    albuterol (PROAIR HFA) 90 mcg/actuation inhaler Inhale 2 puffs into the lungs every 6 (six) hours as needed for Wheezing. 3 Inhaler 3    allopurinol (ZYLOPRIM) 300 MG tablet TAKE 1 TABLET ONE TIME DAILY 90 tablet 3    atorvastatin (LIPITOR) 40 MG tablet TAKE 1 TABLET EVERY DAY 90 tablet 3    blood sugar diagnostic (BLOOD GLUCOSE TEST) Strp Accu- Check  Smartview Test Strips. Test  strip 11    FLUTICASONE/VILANTEROL (BREO ELLIPTA INHL) Inhale 1 puff into the lungs once daily.      glipiZIDE (GLUCOTROL) 5  MG tablet TAKE 1 TABLET TWICE DAILY BEFORE MEALS 180 tablet 3    lancets (ONETOUCH ULTRASOFT LANCETS) Misc Fastclix Lancets. Test  each 11    losartan (COZAAR) 25 MG tablet TAKE 1 TABLET EVERY DAY 90 tablet 3    meclizine (ANTIVERT) 25 mg tablet Take 1 tablet (25 mg total) by mouth 3 (three) times daily as needed for Dizziness. 90 tablet 0    metFORMIN (GLUMETZA) 1000 MG (MOD) 24 hr tablet Take 1 tablet (1,000 mg total) by mouth once daily. 90 tablet 1    metoprolol succinate (TOPROL-XL) 50 MG 24 hr tablet TAKE 1 TABLET EVERY DAY 90 tablet 3    aspirin (ECOTRIN) 81 MG EC tablet Take 1 tablet (81 mg total) by mouth once daily. (Patient taking differently: Take 162 mg by mouth once daily. ) 90 tablet 0    nitroGLYCERIN (NITROSTAT) 0.4 MG SL tablet Place 1 tablet (0.4 mg total) under the tongue every 5 (five) minutes as needed for Chest pain. 25 tablet 11     No current facility-administered medications for this visit.          ROS  Review of Systems   Constitutional: Negative for chills, fever and unexpected weight change.   HENT: Negative for congestion, dental problem, ear pain, hearing loss, rhinorrhea, sore throat and trouble swallowing.    Eyes: Negative for discharge, redness and visual disturbance.   Respiratory: Negative for cough, chest tightness, shortness of breath and wheezing.    Cardiovascular: Negative for chest pain, palpitations and leg swelling.   Gastrointestinal: Negative for abdominal pain, constipation, diarrhea, nausea and vomiting.   Endocrine: Negative for polydipsia, polyphagia and polyuria.   Genitourinary: Negative for decreased urine volume, dysuria and hematuria.   Musculoskeletal: Negative for arthralgias and myalgias.   Skin: Negative for color change and rash.   Neurological: Negative for dizziness, weakness, light-headedness and headaches.   Psychiatric/Behavioral: Negative for decreased concentration, dysphoric mood, sleep disturbance and suicidal ideas.  "          Physical Exam  Vitals:    02/27/18 0935   BP: 130/70   Pulse: 84   Temp: 98.3 °F (36.8 °C)   SpO2: 95%   Weight: 96.5 kg (212 lb 10.1 oz)   Height: 5' 7" (1.702 m)    Body mass index is 33.3 kg/m².  Weight: 96.5 kg (212 lb 10.1 oz)   Height: 5' 7" (170.2 cm)   Physical Exam   Constitutional: He is oriented to person, place, and time. He appears well-developed and well-nourished. No distress.   HENT:   Head: Normocephalic and atraumatic.   Eyes: Conjunctivae, EOM and lids are normal. Pupils are equal, round, and reactive to light. No scleral icterus.   Neck: Full passive range of motion without pain. Neck supple. No JVD present. Carotid bruit is not present. No thyromegaly present.   Cardiovascular: Normal rate, regular rhythm, normal heart sounds and intact distal pulses.  Exam reveals no S3, no S4 and no friction rub.    No murmur heard.  Pulmonary/Chest: Effort normal and breath sounds normal. He has no wheezes. He has no rhonchi. He has no rales.   Abdominal: Soft. Bowel sounds are normal. There is no tenderness.   Musculoskeletal: He exhibits no edema or tenderness.   Lymphadenopathy:        Head (right side): No submental and no submandibular adenopathy present.        Head (left side): No submental and no submandibular adenopathy present.     He has no cervical adenopathy.        Right: No supraclavicular adenopathy present.        Left: No supraclavicular adenopathy present.   Neurological: He is alert and oriented to person, place, and time.   Motor grossly intact.  Sensory grossly intact.  Symmetric facial movements palate elevated symmetrically tongue midline   Skin: Skin is warm and dry. No rash noted.   Psychiatric: He has a normal mood and affect. His speech is normal and behavior is normal. Thought content normal.         Health Maintenance       Date Due Completion Date    TETANUS VACCINE 05/31/1960 ---    Zoster Vaccine 05/31/2002 ---    Hemoglobin A1c 05/20/2018 2/20/2018    Foot Exam " 06/30/2018 6/30/2017    Lipid Panel 08/10/2018 8/10/2017    Eye Exam 08/10/2018 8/10/2017    Override on 3/15/2016: Done ([facility]- Jas Cooper MD-No diabetic retinopathy detected-Comments: Cataracts. Patient should return for re-evaluation in 12 months.)    Override on 11/20/2014: Done (cataracts.  No DM change)    High Dose Statin 12/05/2018 12/5/2017    Colonoscopy 01/24/2023 1/24/2013

## 2018-03-01 ENCOUNTER — PATIENT OUTREACH (OUTPATIENT)
Dept: OTHER | Facility: OTHER | Age: 78
End: 2018-03-01

## 2018-03-01 NOTE — PROGRESS NOTES
Last 6 Patient Entered Readings                                          Most Recent A1c: 8.9% (Goal: 8%)     Recent Readings 1/16/2018 1/12/2018 1/11/2018 1/10/2018 1/9/2018    Blood Glucose (mg/dL) 110 153 141 145 112          Called patient to follow up about his meter. He stated that he did try to bring it by the [facility] but once the rep was available, his wife was being taken back to her appointment and he needed to go with her. He plans to bring it to the [facility] tomorrow. He needs them to walk him through how to scan in the strips again along with setting it back up. He is going to bring everything he needs when he goes. He will call me back if he has any issues when he goes.

## 2018-03-05 ENCOUNTER — TELEPHONE (OUTPATIENT)
Dept: FAMILY MEDICINE | Facility: CLINIC | Age: 78
End: 2018-03-05

## 2018-03-17 ENCOUNTER — OFFICE VISIT (OUTPATIENT)
Dept: FAMILY MEDICINE | Facility: CLINIC | Age: 78
End: 2018-03-17
Payer: MEDICARE

## 2018-03-17 ENCOUNTER — HOSPITAL ENCOUNTER (OUTPATIENT)
Dept: RADIOLOGY | Facility: HOSPITAL | Age: 78
Discharge: HOME | End: 2018-03-17
Attending: NURSE PRACTITIONER
Payer: MEDICARE

## 2018-03-17 DIAGNOSIS — J03.90 TONSILLITIS: ICD-10-CM

## 2018-03-17 DIAGNOSIS — M25.562 LEFT KNEE PAIN, UNSPECIFIED CHRONICITY: ICD-10-CM

## 2018-03-17 DIAGNOSIS — M25.562 LEFT KNEE PAIN, UNSPECIFIED CHRONICITY: Primary | ICD-10-CM

## 2018-03-17 PROCEDURE — 73562 X-RAY EXAM OF KNEE 3: CPT | Mod: 26,LT,, | Performed by: RADIOLOGY

## 2018-03-17 PROCEDURE — 99999 PR PBB SHADOW E&M-EST. PATIENT-LVL III: CPT | Mod: PBBFAC,,, | Performed by: NURSE PRACTITIONER

## 2018-03-17 PROCEDURE — 73562 X-RAY EXAM OF KNEE 3: CPT | Mod: TC,FY,PO,LT

## 2018-03-17 PROCEDURE — 99213 OFFICE O/P EST LOW 20 MIN: CPT | Mod: S$GLB,,, | Performed by: NURSE PRACTITIONER

## 2018-03-17 RX ORDER — IBUPROFEN 800 MG/1
800 TABLET ORAL EVERY 6 HOURS PRN
Qty: 40 TABLET | Refills: 0 | Status: SHIPPED | OUTPATIENT
Start: 2018-03-17 | End: 2018-08-22

## 2018-03-17 RX ORDER — AMOXICILLIN 500 MG/1
500 CAPSULE ORAL 3 TIMES DAILY
Qty: 30 CAPSULE | Refills: 0 | Status: SHIPPED | OUTPATIENT
Start: 2018-03-17 | End: 2018-03-27

## 2018-03-17 NOTE — PATIENT INSTRUCTIONS
Knee Pain  Knee pain is very common. Its especially common in active people who put a lot of pressure on their knees, like runners. It affects women more often than men.  Your kneecap (patella) is a thick, round bone. It covers and protects the front portion of your knee joint. It moves along a groove in your thighbone (femur) as part of the patellofemoral joint. A layer of cartilage surrounds the underside of your kneecap. This layer protects it from grinding against your femur.  When this cartilage softens and breaks down, it can cause knee pain. This is partly because of repetitive stress. The stress irritates the lining of the joint. This causes pain in the underlying bone.  What causes knee pain?  Many things can cause knee pain. You may have more than one cause. Some of these include:  · Overuse of the knee joint  · The kneecap doesnt line up with the tissue around it  · Damage to small nerves in the area  · Damage to the ligament-like structure that holds the kneecap in place (retinaculum)  · Breakdown of the bone under the cartilage  · Swelling in the soft tissues around the kneecap  · Injury  You might be more likely to have knee pain if you:  · Exercise a lot  · Recently increased the intensity of your workouts  · Have a body mass index (BMI) greater than 25  · Have poor alignment of your kneecap  · Walk with your feet turned overly outward or inward  · Have weakness in surrounding muscle groups (inner quad or hip adductor muscles)  · Have too much tightness in surrounding muscle groups (hamstrings or iliotibial band)  · Have a recent history of injury to the area  · Are female  Symptoms of knee pain  This type of knee pain is a dull, aching pain in the front of the knee in the area under and around the kneecap. This pain may start quickly or slowly. Your pain might be worse when you squat, run, or sit for a long time. You might also sometimes feel like your knee is giving out. You may have symptoms in  one or both of your knees.  Diagnosing knee pain  Your healthcare provider will ask about your medical history and your symptoms. Be sure to describe any activities that make your knee pain worse. He or she will look at your knee. This will include tests of your range of motion, strength, and areas of pain of your knee. Your knee alignment will be checked.  Your healthcare provider will need to rule out other causes of your knee pain, such as arthritis. You may need an imaging test, such as an X-ray or MRI.  Treatment for knee pain  Treatments that can help ease your symptoms may include:  · Avoiding activities for a while that make your pain worse, returning to activity over time  · Icing the outside of your knee when it causes you pain  · Taking over-the-counter pain medicine  · Wearing a knee brace or taping your knee to support it  · Wearing special shoe inserts to help keep your feet in the proper alignment  · Doing special exercises to stretch and strengthen the muscles around your hip and your knee  These steps help most people manage knee pain. But some cases of knee pain need to be treated with surgery. You may need surgery right away. Or you may need it later if other treatments dont work. Your healthcare provider may refer you to an orthopedic surgeon. He or she will talk with you about your choices.  Preventing knee pain  Losing weight and correcting excess muscle tightness or muscle weakness may help lower your risk.  In some cases, you can prevent knee pain. To help prevent a flare-up of knee pain, you do these things:  · Regularly do all the exercises your doctor or physical therapist advises  · Support your knee as advised by your doctor or physical therapist  · Increase training gradually, and ease up on training when needed  · Have an expert check your gait for running or other sporting activities  · Stretch properly before and after exercise  · Replace your running shoes regularly  · Lose excess  weight     When to call your healthcare provider  Call your healthcare provider right away if:  · Your symptoms dont get better after a few weeks of treatment  · You have any new symptoms   Date Last Reviewed: 4/1/2017  © 0254-9223 [employer]. [address] [place] [address], [place] [address]. All rights reserved. This information is not intended as a substitute for professional medical care. Always follow your healthcare professional's instructions.

## 2018-03-17 NOTE — PROGRESS NOTES
Subjective:       Patient ID: James Mae Jr. is a 77 y.o. male.    Chief Complaint: left knee pain and sore throat, on Monday he was cooking and twisted his left knee, did not fall, says he felt a pop when he twisted it, began to take tylenol, at first he had some swelling but after 2 days the swelling subsided, walking without a limp,   Also complaiing of throat pain, says his tonsils are swollen and cauising him to have a sore throat and a cough, denies fever   HPI  Review of Systems   Constitutional: Negative for activity change, appetite change, chills, fatigue and fever.   HENT: Positive for postnasal drip and sore throat. Negative for congestion, dental problem, drooling, ear discharge, ear pain, facial swelling, hearing loss, mouth sores, nosebleeds, rhinorrhea, sinus pain, sinus pressure, sneezing, tinnitus, trouble swallowing and voice change.    Respiratory: Positive for cough. Negative for chest tightness, shortness of breath and wheezing.    Cardiovascular: Negative for chest pain and leg swelling.   Gastrointestinal: Negative for abdominal distention, abdominal pain, anal bleeding, blood in stool, constipation, diarrhea, nausea, rectal pain and vomiting.   Genitourinary: Negative for difficulty urinating, enuresis, flank pain, frequency, genital sores and hematuria.   Musculoskeletal: Positive for arthralgias. Negative for back pain, gait problem and joint swelling.   Skin: Negative.  Negative for color change.   Neurological: Negative for dizziness, syncope and numbness.   Psychiatric/Behavioral: Negative for agitation, confusion, decreased concentration, dysphoric mood and hallucinations. The patient is not nervous/anxious and is not hyperactive.        Objective:      Physical Exam   Constitutional: He is oriented to person, place, and time. He appears well-developed and well-nourished.   HENT:   Head: Normocephalic.   Right Ear: Hearing, tympanic membrane, external ear and ear canal normal.    Left Ear: Hearing, tympanic membrane, external ear and ear canal normal.   Nose: Nose normal.   Mouth/Throat: Uvula is midline. Posterior oropharyngeal edema and posterior oropharyngeal erythema present. Tonsils are 2+ on the right. Tonsils are 2+ on the left. Tonsillar exudate.   Cardiovascular: Normal rate, regular rhythm and normal heart sounds.    Pulmonary/Chest: Effort normal and breath sounds normal. No respiratory distress. He has no wheezes. He has no rales.   Abdominal: Soft. He exhibits no distension and no mass. There is no tenderness. There is no guarding.   Musculoskeletal:        Left knee: He exhibits decreased range of motion. He exhibits no swelling, no effusion, no ecchymosis, no deformity, no laceration, no erythema, normal alignment and no LCL laxity.   Neurological: He is alert and oriented to person, place, and time.   Skin: Skin is warm and dry.   Psychiatric: He has a normal mood and affect. His behavior is normal. Judgment and thought content normal.   Nursing note and vitals reviewed.      Xray knee:  No fracture or dislocation.  No joint effusion.  Cartilage spaces are maintained on nonweightbearing views.  Small patellofemoral osteophytes noted.  Assessment:       1. Left knee pain, unspecified chronicity        Plan:       Diagnoses and all orders for this visit:    Left knee pain, unspecified chronicity  -     X-Ray Knee 3 View Left; Future    Tonsillitis    Other orders  -     ibuprofen (ADVIL,MOTRIN) 800 MG tablet; Take 1 tablet (800 mg total) by mouth every 6 (six) hours as needed for Pain.  -     amoxicillin (AMOXIL) 500 MG capsule; Take 1 capsule (500 mg total) by mouth 3 (three) times daily.        Education  Pt has been given instructions populated from [employer] database and those entered into patient instructions field and has verbalized understanding of the after visit summary and information contained wherein.    Follow Up  If no better 2-3 days fu with pcp.    In Case of  Emergency   May go to ER for acute shortness of breath, lightheadedness, fever, or any other emergent complaints or changes in condition.

## 2018-03-19 ENCOUNTER — PATIENT OUTREACH (OUTPATIENT)
Dept: OTHER | Facility: OTHER | Age: 78
End: 2018-03-19

## 2018-03-19 NOTE — PROGRESS NOTES
Last 6 Patient Entered Readings                                          Most Recent A1c: 8.9% (Goal: 8%)     Recent Readings 1/16/2018 1/12/2018 1/11/2018 1/10/2018 1/9/2018    Blood Glucose (mg/dL) 110 153 141 145 112        Diabetes Medications             glipiZIDE (GLUCOTROL) 5 MG tablet TAKE 1 TABLET TWICE DAILY BEFORE MEALS    metFORMIN (GLUCOPHAGE-XR) 500 MG 24 hr tablet Take 2 tablets (1,000 mg total) by mouth daily with breakfast.        Assessment/ Plan:   Called patient to follow up regarding the DDMP (Diabetes Digital Medicine Program).  Patient states he did go to the [facility] on 3/16, and plans to start taking twice daily readings tomorrow.   Per AACE/ACE guidelines (goal A1C < 7%), DM is uncontrolled (A1C on 2/20 was 8.9%).  Patient denies having questions or concerns. Instructed patient to call if he has any questions or concerns, patient confirms understanding.   Will continue to monitor. WCB in 1 month. If BG remains elevated, will discuss titrating metformin.

## 2018-04-13 ENCOUNTER — PES CALL (OUTPATIENT)
Dept: ADMINISTRATIVE | Facility: CLINIC | Age: 78
End: 2018-04-13

## 2018-04-13 DIAGNOSIS — E11.9 DIABETES MELLITUS WITHOUT COMPLICATION: ICD-10-CM

## 2018-04-16 ENCOUNTER — PATIENT OUTREACH (OUTPATIENT)
Dept: OTHER | Facility: OTHER | Age: 78
End: 2018-04-16

## 2018-04-16 NOTE — PROGRESS NOTES
Last 6 Patient Entered Readings                                          Most Recent A1c: 8.9% (Goal: 8%)     Recent Readings 1/16/2018 1/12/2018 1/11/2018 1/10/2018 1/9/2018    Blood Glucose (mg/dL) 110 153 141 145 112        Hypertension Medications             losartan (COZAAR) 25 MG tablet TAKE 1 TABLET EVERY DAY    metoprolol succinate (TOPROL-XL) 50 MG 24 hr tablet TAKE 1 TABLET EVERY DAY    nitroGLYCERIN (NITROSTAT) 0.4 MG SL tablet Place 1 tablet (0.4 mg total) under the tongue every 5 (five) minutes as needed for Chest pain.        Still no readings since 1/16, will request for HC to reach out regarding lack of readings. Will continue to monitor and will follow up in 6 weeks.

## 2018-04-19 ENCOUNTER — PATIENT OUTREACH (OUTPATIENT)
Dept: OTHER | Facility: OTHER | Age: 78
End: 2018-04-19

## 2018-04-19 NOTE — PROGRESS NOTES
Last 6 Patient Entered Readings                                          Most Recent A1c: 8.9% (Goal: 8%)     Recent Readings 1/16/2018 1/12/2018 1/11/2018 1/10/2018 1/9/2018    Blood Glucose (mg/dL) 110 153 141 145 112          Called and LVM to inquired about lack of BS readings. Requested he call me back so we can discuss this.

## 2018-05-02 NOTE — PROGRESS NOTES
Last 6 Patient Entered Readings                                          Most Recent A1c: 8.9% (Goal: 8%)     Recent Readings 5/2/2018 5/1/2018 4/27/2018 4/25/2018 4/24/2018    Blood Glucose (mg/dL) 145 239 219 255 155          Digital Medicine: Health  Follow Up    Lifestyle Modifications:    1.Dietary Modifications (Sodium intake <2,000mg/day, food labels, dining out): Patient stated that he has been off of his low carb diet recently and knows he needs to get back on track. When he started seeing readings in the 200s, he realized it was time for a change. As of yesterday, he stopped eat chocolate and is laying off of the cheese burgers. He has also been in the habit of bringing a snack to the bedside and when he wakes up in the middle of the night to check on his wife, he will eat his snack. He also stated that he has stopped doing this and knows that it is causing his BS to be off. I also explained that it is hard to get an accurate, fasting, BS since he is not waiting 8-10 hours after a meal to take his BS. He plans to work on this as well.     2.Physical Activity: Deferred    3.Medication Therapy: Patient has been compliant with the medication regimen.    4.Patient has the following medication side effects/concerns:   (Frequency/Alleviating factors/Precipitating factors, etc.)     He informed me that his wife is doing ok but it appears that she needs total assistance from him, his daughter, son in law, granddaughter and nurses that come in and out to help care for her. This is causing him a little stress. I did remind that it is important for him to take care of himself in order to be able to take care of her and he agreed.     Follow up with Mr. James Mae Jr. completed. No further questions or concerns. Will continue follow up to achieve health goals.

## 2018-05-08 ENCOUNTER — HOSPITAL ENCOUNTER (OUTPATIENT)
Dept: RADIOLOGY | Facility: HOSPITAL | Age: 78
Discharge: HOME | End: 2018-05-08
Attending: INTERNAL MEDICINE
Payer: MEDICARE

## 2018-05-08 ENCOUNTER — OFFICE VISIT (OUTPATIENT)
Dept: FAMILY MEDICINE | Facility: CLINIC | Age: 78
End: 2018-05-08
Payer: MEDICARE

## 2018-05-08 ENCOUNTER — TELEPHONE (OUTPATIENT)
Dept: FAMILY MEDICINE | Facility: CLINIC | Age: 78
End: 2018-05-08

## 2018-05-08 VITALS
TEMPERATURE: 98 F | OXYGEN SATURATION: 96 % | DIASTOLIC BLOOD PRESSURE: 84 MMHG | HEIGHT: 67 IN | HEART RATE: 67 BPM | WEIGHT: 211.5 LBS | SYSTOLIC BLOOD PRESSURE: 138 MMHG | BODY MASS INDEX: 33.19 KG/M2

## 2018-05-08 DIAGNOSIS — N18.30 TYPE 2 DIABETES MELLITUS WITH STAGE 3 CHRONIC KIDNEY DISEASE, WITHOUT LONG-TERM CURRENT USE OF INSULIN: Chronic | ICD-10-CM

## 2018-05-08 DIAGNOSIS — M54.41 CHRONIC BILATERAL LOW BACK PAIN WITH RIGHT-SIDED SCIATICA: Primary | ICD-10-CM

## 2018-05-08 DIAGNOSIS — G89.29 CHRONIC BILATERAL LOW BACK PAIN WITH RIGHT-SIDED SCIATICA: Primary | ICD-10-CM

## 2018-05-08 DIAGNOSIS — I10 ESSENTIAL HYPERTENSION, BENIGN: ICD-10-CM

## 2018-05-08 DIAGNOSIS — M1A.9XX0 CHRONIC GOUT WITHOUT TOPHUS, UNSPECIFIED CAUSE, UNSPECIFIED SITE: ICD-10-CM

## 2018-05-08 DIAGNOSIS — L02.419 ABSCESS OF AXILLARY REGION: ICD-10-CM

## 2018-05-08 DIAGNOSIS — M54.50 LOW BACK PAIN, NON-SPECIFIC: ICD-10-CM

## 2018-05-08 DIAGNOSIS — E11.22 TYPE 2 DIABETES MELLITUS WITH STAGE 3 CHRONIC KIDNEY DISEASE, WITHOUT LONG-TERM CURRENT USE OF INSULIN: Chronic | ICD-10-CM

## 2018-05-08 DIAGNOSIS — I25.10 CORONARY ARTERY DISEASE INVOLVING NATIVE CORONARY ARTERY WITHOUT ANGINA PECTORIS, UNSPECIFIED WHETHER NATIVE OR TRANSPLANTED HEART: Chronic | ICD-10-CM

## 2018-05-08 DIAGNOSIS — E78.2 MIXED HYPERLIPIDEMIA: Chronic | ICD-10-CM

## 2018-05-08 PROCEDURE — 72100 X-RAY EXAM L-S SPINE 2/3 VWS: CPT | Mod: 26,,, | Performed by: RADIOLOGY

## 2018-05-08 PROCEDURE — 99214 OFFICE O/P EST MOD 30 MIN: CPT | Mod: S$GLB,,, | Performed by: INTERNAL MEDICINE

## 2018-05-08 PROCEDURE — 3079F DIAST BP 80-89 MM HG: CPT | Mod: CPTII,S$GLB,, | Performed by: INTERNAL MEDICINE

## 2018-05-08 PROCEDURE — 99999 PR PBB SHADOW E&M-EST. PATIENT-LVL III: CPT | Mod: PBBFAC,,, | Performed by: INTERNAL MEDICINE

## 2018-05-08 PROCEDURE — 72100 X-RAY EXAM L-S SPINE 2/3 VWS: CPT | Mod: TC,FY,PO

## 2018-05-08 PROCEDURE — 99499 UNLISTED E&M SERVICE: CPT | Mod: S$PBB,,, | Performed by: INTERNAL MEDICINE

## 2018-05-08 PROCEDURE — 3075F SYST BP GE 130 - 139MM HG: CPT | Mod: CPTII,S$GLB,, | Performed by: INTERNAL MEDICINE

## 2018-05-08 PROCEDURE — 3045F PR MOST RECENT HEMOGLOBIN A1C LEVEL 7.0-9.0%: CPT | Mod: CPTII,S$GLB,, | Performed by: INTERNAL MEDICINE

## 2018-05-08 RX ORDER — ATORVASTATIN CALCIUM 40 MG/1
40 TABLET, FILM COATED ORAL DAILY
Qty: 90 TABLET | Refills: 3 | Status: SHIPPED | OUTPATIENT
Start: 2018-05-08 | End: 2019-07-18 | Stop reason: SDUPTHER

## 2018-05-08 RX ORDER — GLIPIZIDE 5 MG/1
5 TABLET ORAL
Qty: 180 TABLET | Refills: 3 | Status: SHIPPED | OUTPATIENT
Start: 2018-05-08 | End: 2018-05-30 | Stop reason: SDUPTHER

## 2018-05-08 RX ORDER — ALLOPURINOL 300 MG/1
TABLET ORAL
Qty: 90 TABLET | Refills: 3 | Status: CANCELLED | OUTPATIENT
Start: 2018-05-08

## 2018-05-08 RX ORDER — ALLOPURINOL 300 MG/1
TABLET ORAL
Qty: 90 TABLET | Refills: 3 | Status: SHIPPED | OUTPATIENT
Start: 2018-05-08 | End: 2019-07-18 | Stop reason: SDUPTHER

## 2018-05-08 RX ORDER — METFORMIN HYDROCHLORIDE 500 MG/1
1000 TABLET, EXTENDED RELEASE ORAL
Qty: 180 TABLET | Refills: 1 | Status: SHIPPED | OUTPATIENT
Start: 2018-05-08 | End: 2018-10-31

## 2018-05-08 RX ORDER — METOPROLOL SUCCINATE 50 MG/1
50 TABLET, EXTENDED RELEASE ORAL DAILY
Qty: 90 TABLET | Refills: 3 | Status: SHIPPED | OUTPATIENT
Start: 2018-05-08 | End: 2019-07-18 | Stop reason: SDUPTHER

## 2018-05-08 RX ORDER — LOSARTAN POTASSIUM 25 MG/1
25 TABLET ORAL DAILY
Qty: 90 TABLET | Refills: 3 | Status: SHIPPED | OUTPATIENT
Start: 2018-05-08 | End: 2018-12-27

## 2018-05-08 RX ORDER — NITROGLYCERIN 0.4 MG/1
0.4 TABLET SUBLINGUAL EVERY 5 MIN PRN
Qty: 25 TABLET | Refills: 11 | Status: SHIPPED | OUTPATIENT
Start: 2018-05-08 | End: 2019-05-08

## 2018-05-08 NOTE — PROGRESS NOTES
Assessment & Plan  Problem List Items Addressed This Visit        Cardiac/Vascular    Essential hypertension, benign (Chronic)    Relevant Medications    losartan (COZAAR) 25 MG tablet    CAD (coronary artery disease) (Chronic)    Overview     S/p 2 stents around 06-07         Relevant Medications    metoprolol succinate (TOPROL-XL) 50 MG 24 hr tablet    nitroGLYCERIN (NITROSTAT) 0.4 MG SL tablet    Mixed hyperlipidemia (Chronic)    Relevant Medications    atorvastatin (LIPITOR) 40 MG tablet       Endocrine    Type 2 diabetes mellitus with stage 3 chronic kidney disease, without long-term current use of insulin (Chronic)    Relevant Medications    glipiZIDE (GLUCOTROL) 5 MG tablet    metFORMIN (GLUCOPHAGE-XR) 500 MG 24 hr tablet       Orthopedic    Gout, chronic (Chronic)    Relevant Medications    allopurinol (ZYLOPRIM) 300 MG tablet      Other Visit Diagnoses     Chronic bilateral low back pain with right-sided sciatica    -  Primary  -    No real limitations of day to day activities.  Check plain film.  Discussed PT referral but he is generally doing well.  Monitor     Low back pain, non-specific    -  As abvove    Relevant Orders    X-Ray Lumbar Spine Ap And Lateral    Abscess of axillary region   -    Small.  He will try conservative measures such as warm compresses.  If worsening will start Po Abx.                Health Maintenance reviewed, deferred.    Follow-up: Follow-up for as previously scheduled.    ______________________________________________________________________    Chief Complaint  Chief Complaint   Patient presents with    Leg Pain     x months    Back Pain     x months       HPI  James Mae Jr. is a 77 y.o. male with multiple medical diagnoses as listed in the medical history and problem list that presents for low back pain and right leg pain for months.  Pt is known to me with his last appointment 3/17/2018.  Also needs refills.      He reports that he has some pain with flexion of  the right hip.  Also reports that he gets worsening pain of his low back when he tried to cut his grass or walks for a prolonged period of time.  Worsen on the left but then sore on the right with radiation into the RLE.  No weakness of the LE, urinary/stool incontinence, saddle anesthesia, numbness of the LEs, fevers.  Standing up straight makes the pain worse.  Pain is not particularly severe.        PAST MEDICAL HISTORY:  Past Medical History:   Diagnosis Date    Asbestosis     COPD with asthma     Coronary artery disease     Diabetes mellitus type II     Hypertension        PAST SURGICAL HISTORY:  Past Surgical History:   Procedure Laterality Date    APPENDECTOMY      8 yrs ago    CHOLECYSTECTOMY      8 yrs ago    CORONARY STENT PLACEMENT      SMALL INTESTINE SURGERY      8 yrs       SOCIAL HISTORY:  Social History     Social History    Marital status:      Spouse name: N/A    Number of children: N/A    Years of education: N/A     Occupational History    Not on file.     Social History Main Topics    Smoking status: Never Smoker    Smokeless tobacco: Never Used    Alcohol use No    Drug use: No    Sexual activity: Yes     Partners: Female     Other Topics Concern    Not on file     Social History Narrative    No narrative on file       FAMILY HISTORY:  Family History   Problem Relation Age of Onset    Cancer Mother         throat    Heart disease Father         heart attack       ALLERGIES AND MEDICATIONS: updated and reviewed.  Review of patient's allergies indicates:  No Known Allergies  Current Outpatient Prescriptions   Medication Sig Dispense Refill    albuterol (PROAIR HFA) 90 mcg/actuation inhaler Inhale 2 puffs into the lungs every 6 (six) hours as needed for Wheezing. 3 Inhaler 3    allopurinol (ZYLOPRIM) 300 MG tablet TAKE 1 TABLET ONE TIME DAILY 90 tablet 3    atorvastatin (LIPITOR) 40 MG tablet Take 1 tablet (40 mg total) by mouth once daily. 90 tablet 3    blood  sugar diagnostic (BLOOD GLUCOSE TEST) [facility] Accu- Check  [employer] Test Strips. Test  strip 11    FLUTICASONE/VILANTEROL (BREO ELLIPTA INHL) Inhale 1 puff into the lungs once daily.      glipiZIDE (GLUCOTROL) 5 MG tablet Take 1 tablet (5 mg total) by mouth 2 (two) times daily before meals. 180 tablet 3    ibuprofen (ADVIL,MOTRIN) 800 MG tablet Take 1 tablet (800 mg total) by mouth every 6 (six) hours as needed for Pain. 40 tablet 0    lancets (ONETOUCH ULTRASOFT LANCETS) Misc Fastclix Lancets. Test  each 11    losartan (COZAAR) 25 MG tablet Take 1 tablet (25 mg total) by mouth once daily. 90 tablet 3    meclizine (ANTIVERT) 25 mg tablet Take 1 tablet (25 mg total) by mouth 3 (three) times daily as needed for Dizziness. 90 tablet 0    metFORMIN (GLUCOPHAGE-XR) 500 MG 24 hr tablet Take 2 tablets (1,000 mg total) by mouth daily with breakfast. 180 tablet 1    metoprolol succinate (TOPROL-XL) 50 MG 24 hr tablet Take 1 tablet (50 mg total) by mouth once daily. 90 tablet 3    aspirin (ECOTRIN) 81 MG EC tablet Take 1 tablet (81 mg total) by mouth once daily. (Patient taking differently: Take 162 mg by mouth once daily. ) 90 tablet 0    nitroGLYCERIN (NITROSTAT) 0.4 MG SL tablet Place 1 tablet (0.4 mg total) under the tongue every 5 (five) minutes as needed for Chest pain. 25 tablet 11     No current facility-administered medications for this visit.          ROS  Review of Systems   Constitutional: Negative for chills, fever and unexpected weight change.   HENT: Negative for congestion, dental problem, ear pain, hearing loss, rhinorrhea, sore throat and trouble swallowing.    Eyes: Negative for discharge, redness and visual disturbance.   Respiratory: Negative for cough, chest tightness, shortness of breath and wheezing.    Cardiovascular: Negative for chest pain, palpitations and leg swelling.   Gastrointestinal: Negative for abdominal pain, constipation, diarrhea, nausea and vomiting.   Endocrine:  "Negative for polydipsia, polyphagia and polyuria.   Genitourinary: Negative for decreased urine volume, difficulty urinating, dysuria, enuresis and hematuria.   Musculoskeletal: Positive for arthralgias and back pain. Negative for myalgias.   Skin: Positive for color change. Negative for rash.   Neurological: Positive for numbness. Negative for dizziness, weakness, light-headedness and headaches.           Physical Exam  Vitals:    05/08/18 0733   BP: 138/84   BP Location: Left arm   Patient Position: Sitting   BP Method: Large (Manual)   Pulse: 67   Temp: 98 °F (36.7 °C)   TempSrc: Oral   SpO2: 96%   Weight: 95.9 kg (211 lb 8 oz)   Height: 5' 7" (1.702 m)    Body mass index is 33.13 kg/m².  Weight: 95.9 kg (211 lb 8 oz)   Height: 5' 7" (170.2 cm)   Physical Exam   Constitutional: He is oriented to person, place, and time. He appears well-developed and well-nourished. No distress.   HENT:   Head: Normocephalic and atraumatic.   Eyes: Conjunctivae, EOM and lids are normal. Pupils are equal, round, and reactive to light. No scleral icterus.   Neck: Full passive range of motion without pain. Carotid bruit is not present.   Cardiovascular: Normal rate, regular rhythm, normal heart sounds and intact distal pulses.  Exam reveals no S3, no S4 and no friction rub.    No murmur heard.  Pulmonary/Chest: Effort normal and breath sounds normal. He has no wheezes. He has no rhonchi. He has no rales.   Abdominal: Soft. Bowel sounds are normal. There is no tenderness.   Musculoskeletal: He exhibits no edema or tenderness.        Cervical back: He exhibits no tenderness and no bony tenderness.        Thoracic back: He exhibits no tenderness and no bony tenderness.        Lumbar back: He exhibits no tenderness and no bony tenderness.   SLR positive on the right and negative on the left   Lymphadenopathy:        Head (right side): No submental and no submandibular adenopathy present.        Head (left side): No submental and no " submandibular adenopathy present.        Right: No supraclavicular adenopathy present.        Left: No supraclavicular adenopathy present.   Neurological: He is alert and oriented to person, place, and time.   Reflex Scores:       Patellar reflexes are 2+ on the right side and 2+ on the left side.       Achilles reflexes are 2+ on the right side and 2+ on the left side.  Motor g5/5 across the ankle and knee.  Sensory grossly intact.  Symmetric facial movements palate elevated symmetrically tongue midline   Skin: Skin is warm and dry. No rash noted. There is erythema (with small abscess in right axillary region).   Psychiatric: He has a normal mood and affect. His speech is normal and behavior is normal. Thought content normal.         Health Maintenance       Date Due Completion Date    TETANUS VACCINE 05/31/1960 ---    Zoster Vaccine 05/31/2002 ---    Foot Exam 06/30/2018 6/30/2017    Hemoglobin A1c 05/20/2018 2/20/2018    Influenza Vaccine 08/01/2018 11/27/2017    Lipid Panel 08/10/2018 8/10/2017    Eye Exam 08/10/2018 8/10/2017    Override on 3/15/2016: Done ([facility]- Jas Cooper MD-No diabetic retinopathy detected-Comments: Cataracts. Patient should return for re-evaluation in 12 months.)    Override on 11/20/2014: Done (cataracts.  No DM change)    High Dose Statin 05/08/2019 5/8/2018    Colonoscopy 01/24/2023 1/24/2013

## 2018-05-08 NOTE — TELEPHONE ENCOUNTER
Please inform the patient that his xray of his back showed that he has some scoliosis (abnormal curve of the back bone) and a large amount of arthritis in his back.  This is the cause for his pain.      I would continue with the plan as we discussed.  He should notify me if things are getting worse.     Thank you,  Erik

## 2018-05-28 ENCOUNTER — OFFICE VISIT (OUTPATIENT)
Dept: FAMILY MEDICINE | Facility: CLINIC | Age: 78
End: 2018-05-28
Payer: MEDICARE

## 2018-05-28 VITALS
HEART RATE: 92 BPM | TEMPERATURE: 98 F | HEIGHT: 67 IN | WEIGHT: 212 LBS | OXYGEN SATURATION: 95 % | BODY MASS INDEX: 33.27 KG/M2 | SYSTOLIC BLOOD PRESSURE: 130 MMHG | DIASTOLIC BLOOD PRESSURE: 70 MMHG

## 2018-05-28 DIAGNOSIS — J44.9 CHRONIC OBSTRUCTIVE PULMONARY DISEASE, UNSPECIFIED COPD TYPE: Chronic | ICD-10-CM

## 2018-05-28 DIAGNOSIS — I10 ESSENTIAL HYPERTENSION, BENIGN: Chronic | ICD-10-CM

## 2018-05-28 DIAGNOSIS — E11.22 TYPE 2 DIABETES MELLITUS WITH STAGE 3 CHRONIC KIDNEY DISEASE, WITHOUT LONG-TERM CURRENT USE OF INSULIN: Primary | Chronic | ICD-10-CM

## 2018-05-28 DIAGNOSIS — N18.30 TYPE 2 DIABETES MELLITUS WITH STAGE 3 CHRONIC KIDNEY DISEASE, WITHOUT LONG-TERM CURRENT USE OF INSULIN: Primary | Chronic | ICD-10-CM

## 2018-05-28 DIAGNOSIS — E78.2 MIXED HYPERLIPIDEMIA: Chronic | ICD-10-CM

## 2018-05-28 DIAGNOSIS — M47.816 SPONDYLOSIS OF LUMBAR REGION WITHOUT MYELOPATHY OR RADICULOPATHY: ICD-10-CM

## 2018-05-28 DIAGNOSIS — Z23 NEED FOR DIPHTHERIA, TETANUS, PERTUSSIS, AND HIB VACCINATION: ICD-10-CM

## 2018-05-28 PROCEDURE — 99214 OFFICE O/P EST MOD 30 MIN: CPT | Mod: S$GLB,,, | Performed by: INTERNAL MEDICINE

## 2018-05-28 PROCEDURE — 3045F PR MOST RECENT HEMOGLOBIN A1C LEVEL 7.0-9.0%: CPT | Mod: CPTII,S$GLB,, | Performed by: INTERNAL MEDICINE

## 2018-05-28 PROCEDURE — 99499 UNLISTED E&M SERVICE: CPT | Mod: S$PBB,,, | Performed by: INTERNAL MEDICINE

## 2018-05-28 PROCEDURE — 3075F SYST BP GE 130 - 139MM HG: CPT | Mod: CPTII,S$GLB,, | Performed by: INTERNAL MEDICINE

## 2018-05-28 PROCEDURE — 3078F DIAST BP <80 MM HG: CPT | Mod: CPTII,S$GLB,, | Performed by: INTERNAL MEDICINE

## 2018-05-28 PROCEDURE — 99999 PR PBB SHADOW E&M-EST. PATIENT-LVL III: CPT | Mod: PBBFAC,,, | Performed by: INTERNAL MEDICINE

## 2018-05-28 NOTE — PROGRESS NOTES
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  Monitor             Pulmonary    COPD (chronic obstructive pulmonary disease) (Chronic)    Current Assessment & Plan     Stable.  Monitor             Cardiac/Vascular    Essential hypertension, benign (Chronic)    Current Assessment & Plan     The current medical regimen is effective;  continue present plan and medications.          Mixed hyperlipidemia (Chronic)    Current Assessment & Plan     Check labs         Relevant Orders    Lipid panel       Endocrine    Type 2 diabetes mellitus with stage 3 chronic kidney disease, without long-term current use of insulin - Primary (Chronic)    Current Assessment & Plan     Improving home sugars back on meds.  Recheck labs.          Relevant Orders    Hemoglobin A1c    Basic metabolic panel    Vitamin D    PTH, intact      Other Visit Diagnoses     Need for diphtheria, tetanus, pertussis, and Hib vaccination   -  Tdap from pharmacy     Relevant Medications    diphth,pertus,acell,,tetanus (BOOSTRIX) 2.5-8-5 Lf-mcg-Lf/0.5mL Susp            Health Maintenance reviewed, as above.    Follow-up: Follow-up in about 3 months (around 8/28/2018) for Routine Physical.    ______________________________________________________________________    Chief Complaint  Chief Complaint   Patient presents with    Hypertension    Hyperlipidemia    Diabetes    Follow-up       HPI  James Mae . is a 77 y.o. male with multiple medical diagnoses as listed in the medical history and problem list that presents for HTN HLD DM follow up.  Pt is known to me with his last appointment 5/8/2018.      He is due for blood work.  Denies any CP, SOB, palpitations.  No hypoglycemic symptoms.  He is taking and tolerating his medications.   Reports that his sugars have been improving.  His back pain is improved from the last OV as well.       PAST MEDICAL HISTORY:  Past Medical History:   Diagnosis Date    Asbestosis     COPD with asthma     Coronary artery disease     Diabetes mellitus type II     Hypertension        PAST SURGICAL HISTORY:  Past Surgical History:   Procedure Laterality Date    APPENDECTOMY      8 yrs ago    CHOLECYSTECTOMY      8 yrs ago    CORONARY STENT PLACEMENT      SMALL INTESTINE SURGERY      8 yrs       SOCIAL HISTORY:  Social History     Social History    Marital status:      Spouse name: N/A    Number of children: N/A    Years of education: N/A     Occupational History    Not on file.     Social History Main Topics    Smoking status: Never Smoker    Smokeless tobacco: Never Used    Alcohol use No    Drug use: No    Sexual activity: Yes     Partners: Female     Other Topics Concern    Not on file     Social History Narrative    No narrative on file       FAMILY HISTORY:  Family History   Problem Relation Age of Onset    Cancer Mother         throat    Heart disease Father         heart attack       ALLERGIES AND MEDICATIONS: updated and reviewed.  Review of patient's allergies indicates:  No Known Allergies  Current Outpatient Prescriptions   Medication Sig Dispense Refill    albuterol (PROAIR HFA) 90 mcg/actuation inhaler Inhale 2 puffs into the lungs every 6 (six) hours as needed for Wheezing. 3 Inhaler 3    allopurinol (ZYLOPRIM) 300 MG tablet TAKE 1 TABLET ONE TIME DAILY 90 tablet 3    atorvastatin (LIPITOR) 40 MG tablet Take 1 tablet (40 mg total) by mouth once daily. 90 tablet 3    blood sugar diagnostic (BLOOD GLUCOSE TEST) RUST Accu- Check  Smartview Test Strips. Test  strip 11    FLUTICASONE/VILANTEROL (BREO ELLIPTA INHL) Inhale 1 puff into the lungs once daily.      glipiZIDE (GLUCOTROL) 5 MG tablet Take 1 tablet (5 mg total) by mouth 2 (two) times daily before meals. 180  tablet 3    ibuprofen (ADVIL,MOTRIN) 800 MG tablet Take 1 tablet (800 mg total) by mouth every 6 (six) hours as needed for Pain. 40 tablet 0    lancets (ONETOUCH ULTRASOFT LANCETS) Misc Fastclix Lancets. Test  each 11    losartan (COZAAR) 25 MG tablet Take 1 tablet (25 mg total) by mouth once daily. 90 tablet 3    meclizine (ANTIVERT) 25 mg tablet Take 1 tablet (25 mg total) by mouth 3 (three) times daily as needed for Dizziness. 90 tablet 0    metFORMIN (GLUCOPHAGE-XR) 500 MG 24 hr tablet Take 2 tablets (1,000 mg total) by mouth daily with breakfast. 180 tablet 1    metoprolol succinate (TOPROL-XL) 50 MG 24 hr tablet Take 1 tablet (50 mg total) by mouth once daily. 90 tablet 3    nitroGLYCERIN (NITROSTAT) 0.4 MG SL tablet Place 1 tablet (0.4 mg total) under the tongue every 5 (five) minutes as needed for Chest pain. 25 tablet 11    aspirin (ECOTRIN) 81 MG EC tablet Take 1 tablet (81 mg total) by mouth once daily. (Patient taking differently: Take 162 mg by mouth once daily. ) 90 tablet 0    diphth,pertus,acell,,tetanus (BOOSTRIX) 2.5-8-5 Lf-mcg-Lf/0.5mL Susp Inject 0.5 mLs into the muscle once. 0.5 mL 0     No current facility-administered medications for this visit.          ROS  Review of Systems   Constitutional: Negative for chills, fever and unexpected weight change.   HENT: Negative for congestion, dental problem, ear pain, hearing loss, rhinorrhea, sore throat and trouble swallowing.    Eyes: Negative for discharge, redness and visual disturbance.   Respiratory: Positive for cough (stable). Negative for chest tightness, shortness of breath and wheezing.    Cardiovascular: Negative for chest pain, palpitations and leg swelling.   Gastrointestinal: Negative for abdominal pain, constipation, diarrhea, nausea and vomiting.   Endocrine: Negative for polydipsia, polyphagia and polyuria.   Genitourinary: Negative for decreased urine volume, dysuria and hematuria.   Musculoskeletal: Positive for back  "pain (improved). Negative for arthralgias and myalgias.   Skin: Negative for color change and rash.   Neurological: Negative for dizziness, weakness, light-headedness and headaches.   Psychiatric/Behavioral: Negative for decreased concentration, dysphoric mood, sleep disturbance and suicidal ideas.           Physical Exam  Vitals:    05/28/18 1006   BP: 130/70   Pulse: 92   Temp: 98.3 °F (36.8 °C)   SpO2: 95%   Weight: 96.2 kg (212 lb)   Height: 5' 7" (1.702 m)    Body mass index is 33.2 kg/m².  Weight: 96.2 kg (212 lb)   Height: 5' 7" (170.2 cm)   Physical Exam   Constitutional: He is oriented to person, place, and time. He appears well-developed and well-nourished. No distress.   HENT:   Head: Normocephalic and atraumatic.   Eyes: Conjunctivae, EOM and lids are normal. Pupils are equal, round, and reactive to light. No scleral icterus.   Neck: Full passive range of motion without pain. Neck supple. No JVD present. Carotid bruit is not present. No thyromegaly present.   Cardiovascular: Normal rate, regular rhythm, normal heart sounds and intact distal pulses.  Exam reveals no S3, no S4 and no friction rub.    No murmur heard.  Pulmonary/Chest: Effort normal and breath sounds normal. He has no wheezes. He has no rhonchi. He has no rales.   Abdominal: Soft. Bowel sounds are normal. There is no tenderness.   Musculoskeletal: He exhibits no edema or tenderness.   Lymphadenopathy:        Head (right side): No submental and no submandibular adenopathy present.        Head (left side): No submental and no submandibular adenopathy present.     He has no cervical adenopathy.        Right: No supraclavicular adenopathy present.        Left: No supraclavicular adenopathy present.   Neurological: He is alert and oriented to person, place, and time.   Motor grossly intact.  Sensory grossly intact.  Symmetric facial movements palate elevated symmetrically tongue midline   Skin: Skin is warm and dry. No rash noted.   Psychiatric: " He has a normal mood and affect. His speech is normal and behavior is normal. Thought content normal.     Protective Sensation (w/ 10 gram monofilament):  Right: Intact  Left: Intact    Visual Inspection:  Normal -  Bilateral    Pedal Pulses:   Right: Present  Left: Present    Posterior tibialis:   Right:Present  Left: Present       Health Maintenance       Date Due Completion Date    TETANUS VACCINE 05/31/1960 ---    Zoster Vaccine 05/31/2002 ---    Hemoglobin A1c 05/20/2018 2/20/2018    Foot Exam 06/30/2018 6/30/2017    Influenza Vaccine 08/01/2018 11/27/2017    Lipid Panel 08/10/2018 8/10/2017    Eye Exam 08/10/2018 8/10/2017    Override on 3/15/2016: Done ([facility]- Jas Cooper MD-No diabetic retinopathy detected-Comments: Cataracts. Patient should return for re-evaluation in 12 months.)    Override on 11/20/2014: Done (cataracts.  No DM change)    High Dose Statin 05/08/2019 5/8/2018    Colonoscopy 01/24/2023 1/24/2013

## 2018-05-28 NOTE — PATIENT INSTRUCTIONS
I will be in touch with your lab results.      Don't take your glipizide tomorrow AM until after the labs when you eat breakfast.

## 2018-05-29 ENCOUNTER — LAB VISIT (OUTPATIENT)
Dept: LAB | Facility: HOSPITAL | Age: 78
End: 2018-05-29
Attending: INTERNAL MEDICINE
Payer: MEDICARE

## 2018-05-29 DIAGNOSIS — E78.2 MIXED HYPERLIPIDEMIA: Chronic | ICD-10-CM

## 2018-05-29 DIAGNOSIS — E11.22 TYPE 2 DIABETES MELLITUS WITH STAGE 3 CHRONIC KIDNEY DISEASE, WITHOUT LONG-TERM CURRENT USE OF INSULIN: Chronic | ICD-10-CM

## 2018-05-29 DIAGNOSIS — N18.30 TYPE 2 DIABETES MELLITUS WITH STAGE 3 CHRONIC KIDNEY DISEASE, WITHOUT LONG-TERM CURRENT USE OF INSULIN: Chronic | ICD-10-CM

## 2018-05-29 LAB
25(OH)D3+25(OH)D2 SERPL-MCNC: 13 NG/ML
ANION GAP SERPL CALC-SCNC: 9 MMOL/L
BUN SERPL-MCNC: 25 MG/DL
CALCIUM SERPL-MCNC: 9.4 MG/DL
CHLORIDE SERPL-SCNC: 110 MMOL/L
CHOLEST SERPL-MCNC: 131 MG/DL
CHOLEST/HDLC SERPL: 3.7 {RATIO}
CO2 SERPL-SCNC: 23 MMOL/L
CREAT SERPL-MCNC: 1.2 MG/DL
EST. GFR  (AFRICAN AMERICAN): >60 ML/MIN/1.73 M^2
EST. GFR  (NON AFRICAN AMERICAN): 59 ML/MIN/1.73 M^2
ESTIMATED AVG GLUCOSE: 197 MG/DL
GLUCOSE SERPL-MCNC: 110 MG/DL
HBA1C MFR BLD HPLC: 8.5 %
HDLC SERPL-MCNC: 35 MG/DL
HDLC SERPL: 26.7 %
LDLC SERPL CALC-MCNC: 72.2 MG/DL
NONHDLC SERPL-MCNC: 96 MG/DL
POTASSIUM SERPL-SCNC: 4.9 MMOL/L
PTH-INTACT SERPL-MCNC: 45 PG/ML
SODIUM SERPL-SCNC: 142 MMOL/L
TRIGL SERPL-MCNC: 119 MG/DL

## 2018-05-29 PROCEDURE — 82306 VITAMIN D 25 HYDROXY: CPT

## 2018-05-29 PROCEDURE — 80061 LIPID PANEL: CPT

## 2018-05-29 PROCEDURE — 36415 COLL VENOUS BLD VENIPUNCTURE: CPT

## 2018-05-29 PROCEDURE — 83970 ASSAY OF PARATHORMONE: CPT

## 2018-05-29 PROCEDURE — 80048 BASIC METABOLIC PNL TOTAL CA: CPT

## 2018-05-29 PROCEDURE — 83036 HEMOGLOBIN GLYCOSYLATED A1C: CPT

## 2018-05-30 ENCOUNTER — PATIENT OUTREACH (OUTPATIENT)
Dept: OTHER | Facility: OTHER | Age: 78
End: 2018-05-30

## 2018-05-30 ENCOUNTER — TELEPHONE (OUTPATIENT)
Dept: FAMILY MEDICINE | Facility: CLINIC | Age: 78
End: 2018-05-30

## 2018-05-30 DIAGNOSIS — N18.30 TYPE 2 DIABETES MELLITUS WITH STAGE 3 CHRONIC KIDNEY DISEASE, WITHOUT LONG-TERM CURRENT USE OF INSULIN: Primary | Chronic | ICD-10-CM

## 2018-05-30 DIAGNOSIS — E11.22 TYPE 2 DIABETES MELLITUS WITH STAGE 3 CHRONIC KIDNEY DISEASE, WITHOUT LONG-TERM CURRENT USE OF INSULIN: Primary | Chronic | ICD-10-CM

## 2018-05-30 DIAGNOSIS — E55.9 VITAMIN D DEFICIENCY: ICD-10-CM

## 2018-05-30 RX ORDER — GLIPIZIDE 5 MG/1
TABLET ORAL
Qty: 270 TABLET | Refills: 3 | Status: SHIPPED | OUTPATIENT
Start: 2018-05-30 | End: 2019-03-19 | Stop reason: SDUPTHER

## 2018-05-30 RX ORDER — ERGOCALCIFEROL 1.25 MG/1
CAPSULE ORAL
Qty: 12 CAPSULE | Refills: 0 | Status: SHIPPED | OUTPATIENT
Start: 2018-05-30 | End: 2018-08-27 | Stop reason: SDUPTHER

## 2018-05-30 NOTE — PROGRESS NOTES
Last 6 Patient Entered Readings                                          Most Recent A1c: 8.5% (Goal: 8%)     Recent Readings 5/28/2018 5/27/2018 5/24/2018 5/21/2018 5/19/2018    Blood Glucose (mg/dL) 133 113 147 165 101          Digital Medicine: Health  Follow Up    Lifestyle Modifications:    1.Dietary Modifications (Low carb, food labels, dining out): Patient has been doing better with watching his carbs. He is no longer snacking in the middle of the night and his daughter has been cooking more lean protein etc. He knows he still has a long way to go and he even got a cook book for low carb meals that he is planning on using soon. He knows one of his issues is with portion control. He eats 2 servings most of the time and plans to start cutting back on that. He will speak with me about any other questions that arise about this.     2.Physical Activity: Deferred    3.Medication Therapy: Patient has been compliant with the medication regimen.    4.Patient has the following medication side effects/concerns:   (Frequency/Alleviating factors/Precipitating factors, etc.)     Patient hopes to loose some weight soon because he knows this will help with his BS numbers and A1C. His A1C has come down a .4 points since his last one 3 months ago (it is now 8.5%). He is still not meeting the goal yet but is making good progress.  He is still caring for his wife around the clock. She is now on hospice care with [facility]. Her memory continues to decline but she is sleeping better at night. This continues to cause more stress on him but he is making due.     Follow up with Mr. James Mae Jr. completed. No further questions or concerns. Will continue follow up to achieve health goals.

## 2018-05-30 NOTE — TELEPHONE ENCOUNTER
Please informthe patient that his labs are continuing to slwoly improve.      We need to start a ONCE A WEEK vit D supplement (sent to pharm).  Also I want him to increase his glipizide to 2 tabs in the AM and 1 tab in the PM.  (New refill sent to pharm).    Sched labs before 3 mo OV please.     Thank you,  Erik

## 2018-05-31 ENCOUNTER — TELEPHONE (OUTPATIENT)
Dept: FAMILY MEDICINE | Facility: CLINIC | Age: 78
End: 2018-05-31

## 2018-05-31 ENCOUNTER — PATIENT OUTREACH (OUTPATIENT)
Dept: OTHER | Facility: OTHER | Age: 78
End: 2018-05-31

## 2018-05-31 NOTE — TELEPHONE ENCOUNTER
----- Message from Anya Zambrano sent at 5/31/2018 10:15 AM CDT -----  Contact: self  Patient requesting a call back regarding change of medication. Please contact him at [phone].    Thanks!

## 2018-05-31 NOTE — PROGRESS NOTES
Last 6 Patient Entered Readings                                          Most Recent A1c: 8.5% (Goal: 8%)     Recent Readings 5/28/2018 5/27/2018 5/24/2018 5/21/2018 5/19/2018    Blood Glucose (mg/dL) 133 113 147 165 101        Diabetes Medications             glipiZIDE (GLUCOTROL) 5 MG tablet Take 2 tabs PO QAM and 1 tab Po QPM    metFORMIN (GLUCOPHAGE-XR) 500 MG 24 hr tablet Take 2 tablets (1,000 mg total) by mouth daily with breakfast.        Assessment/ Plan:   Called patient to follow up regarding the DDMP (Diabetes Digital Medicine Program).   Per AACE/ACE guidelines (goal A1C < 7%), DM is uncontrolled (A1C on 5/29 was 8.5%), but has improved (A1C was 8.9 on 2/20).  Yesterday, PCP increased glipizide to 10/5; however, patient states he was instructed several months ago to increase glipizide to 10/5. Patient states he has called his PCP's office for clarification, waiting on call back.  Patient continues to work on diet.   Patient denies having questions or concerns. Instructed patient to call if he has any questions or concerns, patient confirms understanding.   Will continue to monitor. WCB in 1 month.

## 2018-06-28 ENCOUNTER — PATIENT OUTREACH (OUTPATIENT)
Dept: OTHER | Facility: OTHER | Age: 78
End: 2018-06-28

## 2018-06-28 NOTE — PROGRESS NOTES
Last 6 Patient Entered Readings                                          Most Recent A1c: 8.5% (Goal: 8%)     Recent Readings 6/28/2018 6/27/2018 6/26/2018 6/25/2018 6/18/2018    Blood Glucose (mg/dL) 89 106 78 84 121        Diabetes Medications             glipiZIDE (GLUCOTROL) 5 MG tablet Take 2 tabs PO QAM and 1 tab Po QPM    metFORMIN (GLUCOPHAGE-XR) 500 MG 24 hr tablet Take 2 tablets (1,000 mg total) by mouth daily with breakfast.        Assessment/ Plan:   Called patient to follow up regarding the DDMP (Diabetes Digital Medicine Program).   Per AACE/ACE guidelines (goal A1C < 7%), DM is uncontrolled (A1C on 5/29 was 8.5%); however, since glipizide was increased to 10/5, BG readings are more controlled. Patient states he feels more energetic as well.   Patient denies having questions or concerns. Instructed patient to call if he has any questions or concerns, patient confirms understanding.   Will continue to monitor. WCB in 3 months, sooner if BG begins to trend up or down. Next A1C is due at the end of August.

## 2018-07-12 ENCOUNTER — PATIENT OUTREACH (OUTPATIENT)
Dept: OTHER | Facility: OTHER | Age: 78
End: 2018-07-12

## 2018-07-12 NOTE — PROGRESS NOTES
Last 6 Patient Entered Readings                                          Most Recent A1c: 8.5% (Goal: 8%)     Recent Readings 7/12/2018 7/9/2018 7/6/2018 7/2/2018 6/28/2018    Blood Glucose (mg/dL) 108 139 133 93 89          Digital Medicine: Health  Follow Up    Lifestyle Modifications:    1.Dietary Modifications (Sodium intake <2,000mg/day, food labels, dining out): Patient admits to being off his diet a little here and there. With his wife being be ridden now, it has gotten even harder to keep up with his cooking and finding low carb options. He is eating snacks that he should not have because he sits with his wife and shares things with her. I encouraged him to do his best with this because it is important to keep his BS numbers within goal as well. He agreed and stated that he will do his best.     2.Physical Activity: Deferred     3.Medication Therapy: Patient has been compliant with the medication regimen.    4.Patient has the following medication side effects/concerns:   (Frequency/Alleviating factors/Precipitating factors, etc.)     Patients wife is still on hospice with [facility]. She is no longer getting out of the bed and it is getting more difficult to care for her but he has help from his daughter. He also found out that his half brother has brain cancer and his dog has something wrong with his eyes. He has been getting a lot of bad news one after the other. He is doing his best to maintain his diet and stay on top of his health but all of these things are making it more difficult. I will continue to follow up and encourage him.      Follow up with Mr. James Mae Jr. completed. No further questions or concerns. Will continue follow up to achieve health goals.

## 2018-08-01 ENCOUNTER — OFFICE VISIT (OUTPATIENT)
Dept: FAMILY MEDICINE | Facility: CLINIC | Age: 78
End: 2018-08-01
Payer: MEDICARE

## 2018-08-01 VITALS
HEART RATE: 110 BPM | OXYGEN SATURATION: 95 % | WEIGHT: 215 LBS | TEMPERATURE: 98 F | DIASTOLIC BLOOD PRESSURE: 70 MMHG | HEIGHT: 67 IN | SYSTOLIC BLOOD PRESSURE: 136 MMHG | BODY MASS INDEX: 33.74 KG/M2

## 2018-08-01 DIAGNOSIS — J44.9 CHRONIC OBSTRUCTIVE PULMONARY DISEASE, UNSPECIFIED COPD TYPE: Chronic | ICD-10-CM

## 2018-08-01 DIAGNOSIS — N18.30 TYPE 2 DIABETES MELLITUS WITH STAGE 3 CHRONIC KIDNEY DISEASE, WITHOUT LONG-TERM CURRENT USE OF INSULIN: Chronic | ICD-10-CM

## 2018-08-01 DIAGNOSIS — E11.22 TYPE 2 DIABETES MELLITUS WITH STAGE 3 CHRONIC KIDNEY DISEASE, WITHOUT LONG-TERM CURRENT USE OF INSULIN: Chronic | ICD-10-CM

## 2018-08-01 DIAGNOSIS — R06.02 SHORTNESS OF BREATH: ICD-10-CM

## 2018-08-01 DIAGNOSIS — M25.562 LEFT MEDIAL KNEE PAIN: Primary | ICD-10-CM

## 2018-08-01 DIAGNOSIS — I10 ESSENTIAL HYPERTENSION, BENIGN: Chronic | ICD-10-CM

## 2018-08-01 PROCEDURE — 3078F DIAST BP <80 MM HG: CPT | Mod: CPTII,S$GLB,, | Performed by: INTERNAL MEDICINE

## 2018-08-01 PROCEDURE — 99214 OFFICE O/P EST MOD 30 MIN: CPT | Mod: S$GLB,,, | Performed by: INTERNAL MEDICINE

## 2018-08-01 PROCEDURE — 99999 PR PBB SHADOW E&M-EST. PATIENT-LVL III: CPT | Mod: PBBFAC,,, | Performed by: INTERNAL MEDICINE

## 2018-08-01 PROCEDURE — 3075F SYST BP GE 130 - 139MM HG: CPT | Mod: CPTII,S$GLB,, | Performed by: INTERNAL MEDICINE

## 2018-08-01 RX ORDER — FLUTICASONE FUROATE AND VILANTEROL 200; 25 UG/1; UG/1
1 POWDER RESPIRATORY (INHALATION) DAILY
Qty: 60 EACH | Refills: 5 | Status: SHIPPED | OUTPATIENT
Start: 2018-08-01 | End: 2021-11-22 | Stop reason: SDUPTHER

## 2018-08-01 NOTE — PROGRESS NOTES
Assessment & Plan  Problem List Items Addressed This Visit        Pulmonary    COPD (chronic obstructive pulmonary disease) (Chronic)    Current Assessment & Plan     Restart Breo.           Relevant Medications    fluticasone-vilanterol (BREO ELLIPTA) 200-25 mcg/dose DsDv diskus inhaler       Cardiac/Vascular    Essential hypertension, benign (Chronic)    Current Assessment & Plan     The current medical regimen is effective;  continue present plan and medications.             Endocrine    Type 2 diabetes mellitus with stage 3 chronic kidney disease, without long-term current use of insulin (Chronic)    Current Assessment & Plan     Recheck labs at the end of the lunch           Other Visit Diagnoses     Left medial knee pain    -  Primary  -    Counseled on topical agents to help with this.  PO NSAIDs contraindicated due to CKD    Shortness of breath    -  Check BNP.  Suspect this is due more to COPD    Relevant Orders    Brain natriuretic peptide            Health Maintenance reviewed, deferred appt at the end of the month with me so that he can care for his wife who has end stage dementia.    Follow-up: Follow-up in about 4 months (around 12/1/2018) for Routine Physical.    ______________________________________________________________________    Chief Complaint  Chief Complaint   Patient presents with    Knee Pain     left       HPI  James Mae . is a 78 y.o. male with multiple medical diagnoses as listed in the medical history and problem list that presents for left knee pain for a week.  Pt is known to me with his last appointment 5/28/2018.      It is medial knee pain.  No redness or warmth.  It just hurts but isn't severe.  Hasn't taken anything for this.     No CP, palpitations, hypoglycemic symptoms.  Sugars this AM was 130.  He is feeling SOB.  Hasn't been on his Breo.  Mild orthopnea.        PAST MEDICAL HISTORY:  Past Medical History:   Diagnosis Date    Asbestosis     COPD with asthma      Coronary artery disease     Diabetes mellitus type II     Hypertension        PAST SURGICAL HISTORY:  Past Surgical History:   Procedure Laterality Date    APPENDECTOMY      8 yrs ago    CHOLECYSTECTOMY      8 yrs ago    CORONARY STENT PLACEMENT      SMALL INTESTINE SURGERY      8 yrs       SOCIAL HISTORY:  Social History     Social History    Marital status:      Spouse name: N/A    Number of children: N/A    Years of education: N/A     Occupational History    Not on file.     Social History Main Topics    Smoking status: Never Smoker    Smokeless tobacco: Never Used    Alcohol use No    Drug use: No    Sexual activity: Yes     Partners: Female     Other Topics Concern    Not on file     Social History Narrative    No narrative on file       FAMILY HISTORY:  Family History   Problem Relation Age of Onset    Cancer Mother         throat    Heart disease Father         heart attack       ALLERGIES AND MEDICATIONS: updated and reviewed.  Review of patient's allergies indicates:  No Known Allergies  Current Outpatient Prescriptions   Medication Sig Dispense Refill    albuterol (PROAIR HFA) 90 mcg/actuation inhaler Inhale 2 puffs into the lungs every 6 (six) hours as needed for Wheezing. 3 Inhaler 3    allopurinol (ZYLOPRIM) 300 MG tablet TAKE 1 TABLET ONE TIME DAILY 90 tablet 3    atorvastatin (LIPITOR) 40 MG tablet Take 1 tablet (40 mg total) by mouth once daily. 90 tablet 3    blood sugar diagnostic (BLOOD GLUCOSE TEST) Strp Accu- Check  Smartview Test Strips. Test  strip 11    ergocalciferol (ERGOCALCIFEROL) 50,000 unit Cap Take 1 capsule PO ONCE A WEEK 12 capsule 0    fluticasone-vilanterol (BREO ELLIPTA) 200-25 mcg/dose DsDv diskus inhaler Inhale 1 puff into the lungs once daily. 60 each 5    glipiZIDE (GLUCOTROL) 5 MG tablet Take 2 tabs PO QAM and 1 tab Po  tablet 3    ibuprofen (ADVIL,MOTRIN) 800 MG tablet Take 1 tablet (800 mg total) by mouth every 6 (six)  hours as needed for Pain. 40 tablet 0    lancets (ONETOUCH ULTRASOFT LANCETS) Misc Fastclix Lancets. Test  each 11    losartan (COZAAR) 25 MG tablet Take 1 tablet (25 mg total) by mouth once daily. 90 tablet 3    meclizine (ANTIVERT) 25 mg tablet Take 1 tablet (25 mg total) by mouth 3 (three) times daily as needed for Dizziness. 90 tablet 0    metFORMIN (GLUCOPHAGE-XR) 500 MG 24 hr tablet Take 2 tablets (1,000 mg total) by mouth daily with breakfast. 180 tablet 1    metoprolol succinate (TOPROL-XL) 50 MG 24 hr tablet Take 1 tablet (50 mg total) by mouth once daily. 90 tablet 3    nitroGLYCERIN (NITROSTAT) 0.4 MG SL tablet Place 1 tablet (0.4 mg total) under the tongue every 5 (five) minutes as needed for Chest pain. 25 tablet 11    aspirin (ECOTRIN) 81 MG EC tablet Take 1 tablet (81 mg total) by mouth once daily. (Patient taking differently: Take 162 mg by mouth once daily. ) 90 tablet 0     No current facility-administered medications for this visit.          ROS  Review of Systems   Constitutional: Negative for chills, fever and unexpected weight change.   HENT: Negative for congestion, dental problem, ear pain, hearing loss, rhinorrhea, sore throat and trouble swallowing.    Eyes: Negative for discharge, redness and visual disturbance.   Respiratory: Positive for shortness of breath. Negative for cough, chest tightness and wheezing.    Cardiovascular: Negative for chest pain, palpitations and leg swelling.   Gastrointestinal: Negative for abdominal pain, constipation, diarrhea, nausea and vomiting.   Endocrine: Negative for polydipsia, polyphagia and polyuria.   Genitourinary: Negative for decreased urine volume, dysuria and hematuria.   Musculoskeletal: Positive for arthralgias. Negative for myalgias.   Skin: Negative for color change and rash.   Neurological: Negative for dizziness, weakness, light-headedness and headaches.   Psychiatric/Behavioral: Negative for decreased concentration, dysphoric  "mood, sleep disturbance and suicidal ideas.           Physical Exam  Vitals:    08/01/18 1349 08/01/18 1410   BP: (!) 140/80 136/70   Pulse: 110    Temp: 98.3 °F (36.8 °C)    SpO2: 95%    Weight: 97.5 kg (215 lb)    Height: 5' 7" (1.702 m)     Body mass index is 33.67 kg/m².  Weight: 97.5 kg (215 lb)   Height: 5' 7" (170.2 cm)   Physical Exam   Constitutional: He is oriented to person, place, and time. He appears well-developed and well-nourished. No distress.   HENT:   Head: Normocephalic and atraumatic.   Eyes: Conjunctivae, EOM and lids are normal. Pupils are equal, round, and reactive to light. No scleral icterus.   Neck: Full passive range of motion without pain. Neck supple. No JVD present. Carotid bruit is not present. No thyromegaly present.   Cardiovascular: Normal rate, regular rhythm, normal heart sounds and intact distal pulses.  Exam reveals no S3, no S4 and no friction rub.    No murmur heard.  Pulmonary/Chest: Effort normal. He has no wheezes. He has no rhonchi.   Abdominal: Soft. Bowel sounds are normal. There is no tenderness.   Musculoskeletal: He exhibits no edema.        Left knee: He exhibits no swelling and no effusion. Tenderness found. Medial joint line tenderness noted.   Lymphadenopathy:        Head (right side): No submental and no submandibular adenopathy present.        Head (left side): No submental and no submandibular adenopathy present.     He has no cervical adenopathy.        Right: No supraclavicular adenopathy present.        Left: No supraclavicular adenopathy present.   Neurological: He is alert and oriented to person, place, and time.   Motor grossly intact.  Sensory grossly intact.  Symmetric facial movements palate elevated symmetrically tongue midline   Skin: Skin is warm and dry. No rash noted.   Psychiatric: He has a normal mood and affect. His speech is normal and behavior is normal. Thought content normal.         Health Maintenance       Date Due Completion Date    " TETANUS VACCINE 05/31/1960 ---    Zoster Vaccine 05/31/2002 ---    Influenza Vaccine 08/01/2018 11/27/2017    Eye Exam 08/10/2018 8/10/2017    Override on 3/15/2016: Done ([facility]- Jas Cooper MD-No diabetic retinopathy detected-Comments: Cataracts. Patient should return for re-evaluation in 12 months.)    Override on 11/20/2014: Done (cataracts.  No DM change)    Hemoglobin A1c 08/29/2018 5/29/2018    Foot Exam 05/28/2019 5/28/2018    Lipid Panel 05/29/2019 5/29/2018

## 2018-08-15 ENCOUNTER — PATIENT OUTREACH (OUTPATIENT)
Dept: OTHER | Facility: OTHER | Age: 78
End: 2018-08-15

## 2018-08-15 NOTE — PROGRESS NOTES
"Last 6 Patient Entered Readings                                          Most Recent A1c: 8.5% (Goal: 8%)     Recent Readings 8/14/2018 8/12/2018 8/9/2018 8/8/2018 8/8/2018    Blood Glucose (mg/dL) 158 208 139 203 278          Digital Medicine: Health  Follow Up    Lifestyle Modifications:    1.Dietary Modifications (Sodium intake <2,000mg/day, food labels, dining out): Patient admits to not eating "right" over the last few weeks. He is under a lot of stress right now and has not had time to prepare his own meals etc. He is going to do his best to manage this and hopes he can get back on track soon.     2.Physical Activity: Deferred    3.Medication Therapy: Patient has been compliant with the medication regimen.    4.Patient has the following medication side effects/concerns:   (Frequency/Alleviating factors/Precipitating factors, etc.)     Patients step brother passed away recently and his wife has just been moved to [facility] where she will continue to receive hospice care through [facility]. She stated that her condition has gotten much worse. He goes back and forth from the nursing home everyday to visit with her. Patient is going through a lot right now and knows he is not taking care of himself as much as he should. He hopes to find a balance with this soon.     Follow up with Mr. James Mae Jr. completed. No further questions or concerns. Will continue follow up to achieve health goals.  "

## 2018-08-22 ENCOUNTER — HOSPITAL ENCOUNTER (EMERGENCY)
Facility: HOSPITAL | Age: 78
Discharge: HOME | End: 2018-08-22
Attending: INTERNAL MEDICINE
Payer: MEDICARE

## 2018-08-22 VITALS
SYSTOLIC BLOOD PRESSURE: 124 MMHG | BODY MASS INDEX: 37.56 KG/M2 | HEIGHT: 64 IN | DIASTOLIC BLOOD PRESSURE: 63 MMHG | RESPIRATION RATE: 16 BRPM | HEART RATE: 79 BPM | WEIGHT: 220 LBS | OXYGEN SATURATION: 95 % | TEMPERATURE: 98 F

## 2018-08-22 DIAGNOSIS — B34.9 ACUTE VIRAL SYNDROME: Primary | ICD-10-CM

## 2018-08-22 DIAGNOSIS — R05.9 COUGH: ICD-10-CM

## 2018-08-22 DIAGNOSIS — R06.02 SOB (SHORTNESS OF BREATH): ICD-10-CM

## 2018-08-22 LAB
ALBUMIN SERPL-MCNC: 3.6 G/DL (ref 3.3–5.5)
ALP SERPL-CCNC: 84 U/L (ref 42–141)
BILIRUB SERPL-MCNC: 1.1 MG/DL (ref 0.2–1.6)
BUN SERPL-MCNC: 25 MG/DL (ref 7–22)
CALCIUM SERPL-MCNC: 9 MG/DL (ref 8–10.3)
CHLORIDE SERPL-SCNC: 103 MMOL/L (ref 98–108)
CREAT SERPL-MCNC: 1.4 MG/DL (ref 0.6–1.2)
GLUCOSE SERPL-MCNC: 226 MG/DL (ref 73–118)
POC ALT (SGPT): 27 U/L (ref 10–47)
POC AST (SGOT): 34 U/L (ref 11–38)
POC CARDIAC TROPONIN I: 0 NG/ML
POC TCO2: 23 MMOL/L (ref 18–33)
POCT GLUCOSE: 236 MG/DL (ref 70–110)
POTASSIUM BLD-SCNC: 4.3 MMOL/L (ref 3.6–5.1)
PROTEIN, POC: 7.3 G/DL (ref 6.4–8.1)
SAMPLE: NORMAL
SODIUM BLD-SCNC: 141 MMOL/L (ref 128–145)

## 2018-08-22 PROCEDURE — 96360 HYDRATION IV INFUSION INIT: CPT

## 2018-08-22 PROCEDURE — 82947 ASSAY GLUCOSE BLOOD QUANT: CPT | Mod: 59

## 2018-08-22 PROCEDURE — 25000003 PHARM REV CODE 250: Performed by: INTERNAL MEDICINE

## 2018-08-22 PROCEDURE — 84484 ASSAY OF TROPONIN QUANT: CPT

## 2018-08-22 PROCEDURE — 93005 ELECTROCARDIOGRAM TRACING: CPT

## 2018-08-22 PROCEDURE — 80053 COMPREHEN METABOLIC PANEL: CPT

## 2018-08-22 PROCEDURE — 93010 ELECTROCARDIOGRAM REPORT: CPT | Mod: ,,, | Performed by: INTERNAL MEDICINE

## 2018-08-22 PROCEDURE — 85025 COMPLETE CBC W/AUTO DIFF WBC: CPT

## 2018-08-22 PROCEDURE — 99284 EMERGENCY DEPT VISIT MOD MDM: CPT | Mod: 25

## 2018-08-22 RX ORDER — SODIUM CHLORIDE 9 MG/ML
1000 INJECTION, SOLUTION INTRAVENOUS ONCE
Status: COMPLETED | OUTPATIENT
Start: 2018-08-22 | End: 2018-08-22

## 2018-08-22 RX ORDER — ACETAMINOPHEN 500 MG
1000 TABLET ORAL
Status: COMPLETED | OUTPATIENT
Start: 2018-08-22 | End: 2018-08-22

## 2018-08-22 RX ORDER — IBUPROFEN 600 MG/1
600 TABLET ORAL 3 TIMES DAILY
Qty: 30 TABLET | Refills: 0 | Status: SHIPPED | OUTPATIENT
Start: 2018-08-22

## 2018-08-22 RX ORDER — AZELASTINE 1 MG/ML
2 SPRAY, METERED NASAL 2 TIMES DAILY
Qty: 30 ML | Refills: 0 | Status: SHIPPED | OUTPATIENT
Start: 2018-08-22 | End: 2021-11-22 | Stop reason: SDUPTHER

## 2018-08-22 RX ORDER — FLUTICASONE PROPIONATE 50 MCG
2 SPRAY, SUSPENSION (ML) NASAL DAILY
Qty: 15 G | Refills: 0 | Status: SHIPPED | OUTPATIENT
Start: 2018-08-22

## 2018-08-22 RX ADMIN — ACETAMINOPHEN 1000 MG: 500 TABLET, FILM COATED ORAL at 08:08

## 2018-08-22 RX ADMIN — SODIUM CHLORIDE 1000 ML: 0.9 INJECTION, SOLUTION INTRAVENOUS at 08:08

## 2018-08-23 NOTE — ED PROVIDER NOTES
Encounter Date: 8/22/2018    SCRIBE #1 NOTE: I, Renee Hernández, am scribing for, and in the presence of,  Dr. Love. I have scribed the following portions of the note - Other sections scribed: HPI, ROS, PE.       History     Chief Complaint   Patient presents with    Flu-like Symptoms     pt presents with non-productive cough, SOB which worsenes when lying down; reports chills; states family members with similar symptoms; Hx of asbestosis; reports pain in chest with coughing    Hyperglycemia     pt states he missed his dose of DM meds today; states his BG was 273mg/dL PTA     78 y.o male complains of an acute nonproductive cough for 3 days. He has associated chills, chest pain and runny nose. He denies fever, nausea, vomiting, diarrhea, and SOB.  He reports he did a lot of physical activity today and did not drink a lot of fluids. He has a history of asbestos in the right lung. He rates his pain as a 4/10.       The history is provided by the patient.     Review of patient's allergies indicates:  No Known Allergies  Past Medical History:   Diagnosis Date    Asbestosis     COPD with asthma     Coronary artery disease     Diabetes mellitus type II     Hypertension      Past Surgical History:   Procedure Laterality Date    APPENDECTOMY      8 yrs ago    CHOLECYSTECTOMY      8 yrs ago    CORONARY STENT PLACEMENT      SMALL INTESTINE SURGERY      8 yrs     Family History   Problem Relation Age of Onset    Cancer Mother         throat    Heart disease Father         heart attack     Social History     Tobacco Use    Smoking status: Never Smoker    Smokeless tobacco: Never Used   Substance Use Topics    Alcohol use: No    Drug use: No     Review of Systems   Constitutional: Positive for chills. Negative for fever.   HENT: Positive for rhinorrhea. Negative for sore throat.    Respiratory: Positive for cough. Negative for shortness of breath.    Cardiovascular: Positive for chest pain.   Gastrointestinal:  Negative for nausea.   Genitourinary: Negative for dysuria.   Musculoskeletal: Negative for back pain.   Skin: Negative for rash.   Neurological: Negative for weakness.   Hematological: Does not bruise/bleed easily.   All other systems reviewed and are negative.      Physical Exam     Initial Vitals [08/22/18 1952]   BP Pulse Resp Temp SpO2   (!) 171/77 90 (!) 22 98.5 °F (36.9 °C) (!) 93 %      MAP       --         Physical Exam    Nursing note and vitals reviewed.  Constitutional: He appears well-developed and well-nourished. He is not diaphoretic. No distress.   HENT:   Head: Normocephalic and atraumatic.   Right Ear: External ear normal.   Left Ear: External ear normal.   Mouth/Throat: Dental caries present. Posterior oropharyngeal erythema present. No oropharyngeal exudate or posterior oropharyngeal edema.   Eyes: Conjunctivae and EOM are normal. Pupils are equal, round, and reactive to light.   Neck: Normal range of motion. Neck supple.   Cardiovascular: Normal rate, regular rhythm and normal heart sounds. Exam reveals no gallop and no friction rub.    No murmur heard.  Pulmonary/Chest: Breath sounds normal. No respiratory distress. He has no wheezes. He has no rhonchi. He has no rales.   Abdominal: Soft. Bowel sounds are normal. He exhibits no distension.   Musculoskeletal: Normal range of motion.   Neurological: He is alert and oriented to person, place, and time.   Skin: Skin is warm and dry.   Psychiatric: He has a normal mood and affect. Thought content normal.         ED Course   Procedures  Labs Reviewed   POCT GLUCOSE - Abnormal; Notable for the following components:       Result Value    POCT Glucose 236 (*)     All other components within normal limits   POCT CMP - Abnormal; Notable for the following components:    POC BUN 25 (*)     POC Creatinine 1.4 (*)     POC Glucose 226 (*)     All other components within normal limits   TROPONIN ISTAT   POCT CBC   POCT CMP   POCT TROPONIN     EKG Readings:  (Independently Interpreted)   Rhythm: Normal Sinus Rhythm. Heart Rate: 93. Ectopy: No Ectopy. Conduction: Normal. ST Segments: Normal ST Segments. T Waves: Normal. Clinical Impression: Normal Sinus Rhythm       Imaging Results          X-Ray Chest AP Portable (Final result)  Result time 08/22/18 21:11:37    Final result by Roger Meza MD (08/22/18 21:11:37)                 Impression:      No acute process.      Electronically signed by: Roger Meza MD  Date:    08/22/2018  Time:    21:11             Narrative:    EXAMINATION:  XR CHEST AP PORTABLE    CLINICAL HISTORY:  Cough    TECHNIQUE:  Single frontal view of the chest was performed.    COMPARISON:  08/01/2017.    FINDINGS:  Monitoring EKG leads are present.  The lung apices are obscured by the patient's chin.  The trachea is unremarkable.  There are calcifications of the aortic knob.  There is stable prominence of the cardiomediastinal silhouette.  The hemidiaphragms are unremarkable.  There is no evidence of free air beneath the hemidiaphragms.  There are no pleural effusions.  There is no evidence of a pneumothorax.  There is no evidence of pneumomediastinum.  There is subsegmental atelectasis in the lung bases.  There are degenerative changes in the osseous structures.  The subcutaneous tissues are unremarkable.                                 Medical Decision Making:   Initial Assessment:   78 y.o male complains of an acute nonproductive cough for 3 days. He has associated chills, chest pain and runny nose. He denies fever, nausea, vomiting, diarrhea, and SOB.  He reports he did a lot of physical activity today and did not drink a lot of fluids. He has a history of asbestos in the right lung. He rates his pain as a 4/10.     Clinical Tests:   Lab Tests: Ordered and Reviewed  Medical Tests: Ordered and Reviewed  ED Management:  Patient was given normal saline bolus and Tylenol in the emergency department.  CBC and CMP were performed.  CMP revealed slightly  elevated BUN and creatinine and glucose of 226.  CBC revealed a slightly elevated white blood cell count, but was otherwise within normal limits. Patient states he feels much better after IV fluids and was given instructions for acute viral syndrome/upper respiratory infection.  Prescription for Astelin, fluticasone and ibuprofen were given and the patient was advised to follow up with his PCP within the next 2 days for re-evaluation/return to the emergency department if condition worsens.            Scribe Attestation:   Scribe #1: I performed the above scribed service and the documentation accurately describes the services I performed. I attest to the accuracy of the note.        This document was produced by a scribe under my direction and in my presence. I agree with the content of the note and have made any necessary edits.     Dr. Love    08/26/2018 1:34 AM          Clinical Impression:     1. Acute viral syndrome    2. SOB (shortness of breath)    3. Cough        Disposition:   Disposition: Discharged  Condition: Stable                        Tunde Love MD  08/26/18 0137

## 2018-08-27 ENCOUNTER — TELEPHONE (OUTPATIENT)
Dept: FAMILY MEDICINE | Facility: CLINIC | Age: 78
End: 2018-08-27

## 2018-08-27 ENCOUNTER — LAB VISIT (OUTPATIENT)
Dept: LAB | Facility: HOSPITAL | Age: 78
End: 2018-08-27
Attending: INTERNAL MEDICINE
Payer: MEDICARE

## 2018-08-27 DIAGNOSIS — E55.9 VITAMIN D DEFICIENCY: ICD-10-CM

## 2018-08-27 DIAGNOSIS — N18.30 TYPE 2 DIABETES MELLITUS WITH STAGE 3 CHRONIC KIDNEY DISEASE, WITHOUT LONG-TERM CURRENT USE OF INSULIN: Chronic | ICD-10-CM

## 2018-08-27 DIAGNOSIS — R06.02 SHORTNESS OF BREATH: ICD-10-CM

## 2018-08-27 DIAGNOSIS — E11.22 TYPE 2 DIABETES MELLITUS WITH STAGE 3 CHRONIC KIDNEY DISEASE, WITHOUT LONG-TERM CURRENT USE OF INSULIN: Chronic | ICD-10-CM

## 2018-08-27 LAB
25(OH)D3+25(OH)D2 SERPL-MCNC: 11 NG/ML
ANION GAP SERPL CALC-SCNC: 8 MMOL/L
BNP SERPL-MCNC: 26 PG/ML
BUN SERPL-MCNC: 23 MG/DL
CALCIUM SERPL-MCNC: 9.2 MG/DL
CHLORIDE SERPL-SCNC: 107 MMOL/L
CO2 SERPL-SCNC: 24 MMOL/L
CREAT SERPL-MCNC: 1.2 MG/DL
EST. GFR  (AFRICAN AMERICAN): >60 ML/MIN/1.73 M^2
EST. GFR  (NON AFRICAN AMERICAN): 58.4 ML/MIN/1.73 M^2
ESTIMATED AVG GLUCOSE: 189 MG/DL
GLUCOSE SERPL-MCNC: 163 MG/DL
HBA1C MFR BLD HPLC: 8.2 %
POTASSIUM SERPL-SCNC: 4.5 MMOL/L
SODIUM SERPL-SCNC: 139 MMOL/L

## 2018-08-27 PROCEDURE — 36415 COLL VENOUS BLD VENIPUNCTURE: CPT | Mod: PO

## 2018-08-27 PROCEDURE — 83880 ASSAY OF NATRIURETIC PEPTIDE: CPT

## 2018-08-27 PROCEDURE — 83036 HEMOGLOBIN GLYCOSYLATED A1C: CPT

## 2018-08-27 PROCEDURE — 80048 BASIC METABOLIC PNL TOTAL CA: CPT

## 2018-08-27 PROCEDURE — 82306 VITAMIN D 25 HYDROXY: CPT

## 2018-08-27 RX ORDER — ERGOCALCIFEROL 1.25 MG/1
CAPSULE ORAL
Qty: 12 CAPSULE | Refills: 4 | Status: SHIPPED | OUTPATIENT
Start: 2018-08-27

## 2018-08-27 NOTE — TELEPHONE ENCOUNTER
"Patient instructed to continue taking vitamin D once weekly and verbalized understanding.  He states that he is not hurting as much as before and is breathing better now but states that his chest is sore "like I was in a fight" and that breathing is more difficult when lying down.  He also reports that his cough is worse and he still has a runny nose.  Patient is also requesting a prescription for the Tdap vaccine to be placed at the  for him to  as he has found somewhere that he can receive it.   Daughter, Brianda, instructed of dose change with glipizide per patient request and instructed to contact this office for any concerns.    Verbalized understanding of instructions given.  "

## 2018-08-27 NOTE — TELEPHONE ENCOUNTER
Please inform the patient that his labs generally looked stable.  He needs to stay on the once a week Vit D supplement.      I also want him to increase his glipizide to 10mg in the AM and 10mg in the PM.  This will be 2 tabs in the AM and 2 tabs in the PM.  When he runs out, I will send in the new dose.     Is he feeling better since the ER visit?      Thank you,  Erik

## 2018-09-14 ENCOUNTER — PATIENT OUTREACH (OUTPATIENT)
Dept: OTHER | Facility: OTHER | Age: 78
End: 2018-09-14

## 2018-09-14 NOTE — PROGRESS NOTES
Last 6 Patient Entered Readings                                          Most Recent A1c: 8.2% on 8/27/2018  (Goal: 8%)     Recent Readings 9/14/2018 9/13/2018 9/1/2018 8/22/2018 8/17/2018    Blood Glucose (mg/dL) 128 261 185 273 127          Digital Medicine: Health  Follow Up    Lifestyle Modifications:    1.Dietary Modifications (Sodium intake <2,000mg/day, food labels, dining out): Patient is doing better with his diet but still struggles some days. He has cut back on his portions and instead of eating 2 plates of food, he will only eat 1 plate etc. He mentioned that he slipped up yesterday and ate snacks/sweets because he was trying to get his wife to eat something she liked. Will continue working on this with him.     2.Physical Activity: Deferred    3.Medication Therapy: Patient has been compliant with the medication regimen. Patient typically is taking his medication as prescribed. He mentioned that he forgot to take his diabetes medication yesterday and believes that was attributing to his higher BS reading (along with the sweets he ate).     4.Patient has the following medication side effects/concerns:   (Frequency/Alleviating factors/Precipitating factors, etc.)     Patients wife is still at [facility] under the care of [facility]. He stated that she is actually doing better but that everyone is telling him not to get his hopes up. It has been a tough few months for the patient and he is doing the best he can to manage.     Follow up with Mr. James Mae Jr. completed. No further questions or concerns. Will continue follow up to achieve health goals.

## 2018-09-19 ENCOUNTER — PATIENT OUTREACH (OUTPATIENT)
Dept: OTHER | Facility: OTHER | Age: 78
End: 2018-09-19

## 2018-09-19 DIAGNOSIS — N18.30 TYPE 2 DIABETES MELLITUS WITH STAGE 3 CHRONIC KIDNEY DISEASE, WITHOUT LONG-TERM CURRENT USE OF INSULIN: Primary | Chronic | ICD-10-CM

## 2018-09-19 DIAGNOSIS — E11.22 TYPE 2 DIABETES MELLITUS WITH STAGE 3 CHRONIC KIDNEY DISEASE, WITHOUT LONG-TERM CURRENT USE OF INSULIN: Primary | Chronic | ICD-10-CM

## 2018-09-19 NOTE — PROGRESS NOTES
Last 6 Patient Entered Readings                                          Most Recent A1c: 8.2% on 8/27/2018  (Goal: 8%)     Recent Readings 9/16/2018 9/14/2018 9/13/2018 9/1/2018 8/22/2018    Blood Glucose (mg/dL) 104 128 261 185 273        Diabetes Medications             glipiZIDE (GLUCOTROL) 5 MG tablet Take 2 tabs PO QAM and 1 tab Po QPM    metFORMIN (GLUCOPHAGE-XR) 500 MG 24 hr tablet Take 2 tablets (1,000 mg total) by mouth daily with breakfast.        Assessment/ Plan:   Called patient to follow up regarding the DDMP (Diabetes Digital Medicine Program), reviewed recent readings.   Per AACE/ACE guidelines (goal A1C < 7%), DM is slightly uncontrolled (A1C on 8/27 was 8.2%). Next A1C will be due in 11/2018.  Patient has recently resumed taking digital [facility] readings. Requested patient take more frequent readings so I can better determine whether or not we need to adjust his DM medication regimen, patient confirms understanding.   Patient denies having questions or concerns. Instructed patient to call if he has any questions or concerns, patient confirms understanding.   Will continue to monitor. WCB in 1 month.

## 2018-09-24 ENCOUNTER — PATIENT OUTREACH (OUTPATIENT)
Dept: OTHER | Facility: OTHER | Age: 78
End: 2018-09-24

## 2018-09-24 NOTE — PROGRESS NOTES
Last 6 Patient Entered Readings                                          Most Recent A1c: 8.2% on 8/27/2018  (Goal: 8%)     Recent Readings 9/24/2018 9/23/2018 9/22/2018 9/20/2018 9/16/2018    Blood Glucose (mg/dL) 57 274 188 140 104          Spoke with patient about low BS reading. He believes it is inaccurate because he feels fine and his reading was much higher on his home meter. He will take another reading on our meter tomorrow morning. His diet is still off because of his schedule with going to the nursing home to visit with his wife who is on hospice so he is just eating whatever he can when he is on the go or at home. He knows he needs to start focusing on himself more but it is just very difficult for him to do that right now given his wife's condition.

## 2018-10-17 ENCOUNTER — PATIENT OUTREACH (OUTPATIENT)
Dept: OTHER | Facility: OTHER | Age: 78
End: 2018-10-17

## 2018-10-17 DIAGNOSIS — N18.30 TYPE 2 DIABETES MELLITUS WITH STAGE 3 CHRONIC KIDNEY DISEASE, WITHOUT LONG-TERM CURRENT USE OF INSULIN: Chronic | ICD-10-CM

## 2018-10-17 DIAGNOSIS — E11.22 TYPE 2 DIABETES MELLITUS WITH STAGE 3 CHRONIC KIDNEY DISEASE, WITHOUT LONG-TERM CURRENT USE OF INSULIN: Chronic | ICD-10-CM

## 2018-10-17 NOTE — PROGRESS NOTES
Last 6 Patient Entered Readings                                          Most Recent A1c: 8.2% on 8/27/2018  (Goal: 8%)     Recent Readings 10/17/2018 10/15/2018 10/6/2018 10/3/2018 9/30/2018    Blood Glucose (mg/dL) 189 138 146 243 187        Diabetes Medications             glipiZIDE (GLUCOTROL) 5 MG tablet Take 2 tabs PO QAM and 1 tab Po QPM    metFORMIN (GLUCOPHAGE-XR) 500 MG 24 hr tablet Take 2 tablets (1,000 mg total) by mouth daily with breakfast.        Plan:   Called to follow up with patient, reviewed recent readings. Per AACE/ACE guidelines (goal A1C < 8%), DM is slightly uncontrolled (A1C on 8/27 was 8.2%).  VM not set up.  Will continue to monitor. WCB in 2 weeks.

## 2018-10-26 ENCOUNTER — PATIENT OUTREACH (OUTPATIENT)
Dept: OTHER | Facility: OTHER | Age: 78
End: 2018-10-26

## 2018-10-26 NOTE — PROGRESS NOTES
Last 6 Patient Entered Readings                                          Most Recent A1c: 8.2% on 8/27/2018  (Goal: 8%)     Recent Readings 10/23/2018 10/23/2018 10/17/2018 10/15/2018 10/6/2018    Blood Glucose (mg/dL) 203 184 189 138 146          Digital Medicine: Health  Follow Up    Lifestyle Modifications:    1.Dietary Modifications (Sodium intake <2,000mg/day, food labels, dining out): Patient continues struggling with his low carb diet. He stated that he is on the go a lot and driving to and from the nursing home where his wife is so it does not leave him with a lot of time to cook healthier meals or grab better options. He stated that he has been eating chocolate because his wife insists and will only eat it if he does. Patients wife is on hospice and her memory is getting worse so it upsets her if he does not eat with her. He understands that this is contributing to his higher BS readings and plans to work on this. He also mentioned that his portions sizes for his meals is too big. He plans to cut back on this as well. He and I will continue to work on this. It will continue to be a struggle due to all the stress he is under.     2.Physical Activity: Deferred    3.Medication Therapy: Patient has been compliant with the medication regimen. Patient is doing well on his current regimen. He denies symptoms/side effects.     4.Patient has the following medication side effects/concerns:   (Frequency/Alleviating factors/Precipitating factors, etc.)     Follow up with Mr. James Mae Jr. completed. No further questions or concerns. Will continue to follow up to achieve health goals.

## 2018-10-31 RX ORDER — METFORMIN HYDROCHLORIDE 500 MG/1
TABLET, EXTENDED RELEASE ORAL
Qty: 270 TABLET | Refills: 1
Start: 2018-10-31 | End: 2018-11-14

## 2018-10-31 NOTE — PROGRESS NOTES
Last 6 Patient Entered Readings                                          Most Recent A1c: 8.2% on 8/27/2018  (Goal: 8%)     Recent Readings 10/28/2018 10/23/2018 10/23/2018 10/17/2018 10/15/2018    Blood Glucose (mg/dL) 168 203 184 189 138        Diabetes Medications             glipiZIDE (GLUCOTROL) 5 MG tablet Take 2 tabs PO QAM and 1 tab Po QPM    metFORMIN (GLUCOPHAGE-XR) 500 MG 24 hr tablet Take 2 tablets (1,000 mg total) by mouth daily with breakfast.        Assessment/ Plan:   Called patient to follow up regarding the DDMP (Diabetes Digital Medicine Program), reviewed recent readings.   Per AACE/ACE guidelines (goal A1C < 8%), DM is slightly uncontrolled (A1C on 8/27 was 8.2%). Next A1C will be due in 11/2018.  Discussed with and instructed patient to increase metformin XR to 1000mg qam and 500mg qhs, patient confirms understanding.   Patient denies having questions or concerns. Instructed patient to call if he has any questions or concerns, patient confirms understanding.   Will continue to monitor. WCB in 2 weeks. If [facility] readings remain elevated, will discuss increasing metformin to 1000mg BID.

## 2018-11-14 ENCOUNTER — PATIENT OUTREACH (OUTPATIENT)
Dept: OTHER | Facility: OTHER | Age: 78
End: 2018-11-14

## 2018-11-14 DIAGNOSIS — E11.22 TYPE 2 DIABETES MELLITUS WITH STAGE 3 CHRONIC KIDNEY DISEASE, WITHOUT LONG-TERM CURRENT USE OF INSULIN: Chronic | ICD-10-CM

## 2018-11-14 DIAGNOSIS — N18.30 TYPE 2 DIABETES MELLITUS WITH STAGE 3 CHRONIC KIDNEY DISEASE, WITHOUT LONG-TERM CURRENT USE OF INSULIN: Chronic | ICD-10-CM

## 2018-11-14 RX ORDER — METFORMIN HYDROCHLORIDE 500 MG/1
1000 TABLET, EXTENDED RELEASE ORAL 2 TIMES DAILY WITH MEALS
Qty: 270 TABLET | Refills: 1 | Status: SHIPPED | OUTPATIENT
Start: 2018-11-14 | End: 2018-12-27 | Stop reason: SDUPTHER

## 2018-11-14 NOTE — PROGRESS NOTES
HPI:  Called patient to follow up regarding the DDMP (Diabetes Digital Medicine Program). Patient states his wife will be coming home from hospice/ nursing home.     Patient denies hypoglycemic s/sx (dizziness, extreme hunger, headaches, confusion, trouble concentrating, sweating, shaking, blurred vision, personality changes) associated with low readings (BG < 70).  Instructed patient to inform me if he does develop hypoglycemic s/sx associated with low readings, patient confirms understanding.    Patient denies hyperglycemic s/sx (increased thirst, increased urination, headaches, trouble concentrating, blurred vision, fatigue) associated with high readings.     Last 6 Patient Entered Readings                                          Most Recent A1c: 8.2% on 8/27/2018  (Goal: 8%)     Recent Readings 10/28/2018 10/23/2018 10/23/2018 10/17/2018 10/15/2018    Blood Glucose (mg/dL) 168 203 184 189 138        Assessment:  Reviewed recent readings. Per AACE/ACE guidelines (goal A1C < 7%), DM is slightly uncontrolled (A1C on 8/27 was 8.2%). Next A1C is due.    Plan:  Discussed with and instructed patient to increase metformin to 1000mg BID.     Patients health , Ana Rosa Cason, will be following up every 3-4 weeks. I will continue to monitor regularly and will follow-up in 2 weeks.    Current Medication Regimen:  Diabetes Medications             glipiZIDE (GLUCOTROL) 5 MG tablet Take 2 tabs PO QAM and 1 tab Po QPM    metFORMIN (GLUCOPHAGE-XR) 500 MG 24 hr tablet Take 2 tablets (1,000 mg total) by mouth 2 (two) times daily with meals.        Patient has my contact information and knows to call with any concerns or clinical changes.

## 2018-11-28 NOTE — PROGRESS NOTES
Last 6 Patient Entered Readings                                          Most Recent A1c: 8.2% on 8/27/2018  (Goal: 8%)     Recent Readings 10/28/2018 10/23/2018 10/23/2018 10/17/2018 10/15/2018    Blood Glucose (mg/dL) 168 203 184 189 138        No readings since 10/28. HC Ana Rosa Cason is scheduled to reach out tomorrow. Will request for her to ask patient to resume BG readings. A1C is due, patient is scheduled to see PCP on 12/3. Will follow up in 3 weeks.

## 2018-11-29 ENCOUNTER — PATIENT OUTREACH (OUTPATIENT)
Dept: OTHER | Facility: OTHER | Age: 78
End: 2018-11-29

## 2018-11-29 NOTE — PROGRESS NOTES
Digital Medicine: Health  Follow Up    Lifestyle Modifications:    1.Dietary Modifications (Sodium intake <2,000mg/day, food labels, dining out): Deferred    2.Physical Activity: Deferred    3.Medication Therapy: Patient has not been compliant with the medication regimen. Patient informed me that he has been out of his Metformin for over a week. He thought his medication was called into [facility] but he never received it in the mail. I checked in the chart and it looks like the prescription may have been sent to the [facility] pharmacy in [place]. I provided him with the phone number and he is going to call and check to see if they have it there (he updated me while documenting this note and stated that they did not fill it but can fill it so he will  the prescription from there).     4.Patient has the following medication side effects/concerns:   (Frequency/Alleviating factors/Precipitating factors, etc.)     I requested that the patient start taking his BS readings 2-3 times per week and he stated that he will. He informed me that he stopped taking his readings because he knew his BS was high due to missing his medication and did not want us to think that he needed a medication increase just because the readings were high (since they were high because he was missing medication). I explained that we would like to see the readings either way. I also explained that I could have reached out, based on his BS readings, and found out that he was missing medication and we could have addressed that sooner. Patient expressed understanding.    I also requested that he have his A1C drawn on Monday, 12/3, when he goes to the doctor for a follow up. I explained that he has a standing order and can just walk in and have the test completed. Patient expressed understanding.    Patients wife is doing better and she is still at home on hospice. He has help from his daughter, granddaughter and son in law. It is still  stressful but he feels better about it because he can his wife is much happier at home.     Follow up with . James Mae Jr. completed. No further questions or concerns. Will continue to follow up to achieve health goals.

## 2018-12-03 ENCOUNTER — PATIENT MESSAGE (OUTPATIENT)
Dept: ADMINISTRATIVE | Facility: OTHER | Age: 78
End: 2018-12-03

## 2018-12-03 ENCOUNTER — LAB VISIT (OUTPATIENT)
Dept: LAB | Facility: HOSPITAL | Age: 78
End: 2018-12-03
Attending: INTERNAL MEDICINE
Payer: MEDICARE

## 2018-12-03 ENCOUNTER — OFFICE VISIT (OUTPATIENT)
Dept: FAMILY MEDICINE | Facility: CLINIC | Age: 78
End: 2018-12-03
Payer: MEDICARE

## 2018-12-03 VITALS
HEIGHT: 70 IN | BODY MASS INDEX: 30.06 KG/M2 | DIASTOLIC BLOOD PRESSURE: 70 MMHG | WEIGHT: 210 LBS | OXYGEN SATURATION: 95 % | TEMPERATURE: 98 F | SYSTOLIC BLOOD PRESSURE: 136 MMHG | HEART RATE: 82 BPM

## 2018-12-03 DIAGNOSIS — Z23 NEED FOR INFLUENZA VACCINATION: ICD-10-CM

## 2018-12-03 DIAGNOSIS — E78.2 MIXED HYPERLIPIDEMIA: Chronic | ICD-10-CM

## 2018-12-03 DIAGNOSIS — M47.816 SPONDYLOSIS OF LUMBAR REGION WITHOUT MYELOPATHY OR RADICULOPATHY: Chronic | ICD-10-CM

## 2018-12-03 DIAGNOSIS — E11.22 TYPE 2 DIABETES MELLITUS WITH STAGE 3 CHRONIC KIDNEY DISEASE, WITHOUT LONG-TERM CURRENT USE OF INSULIN: Chronic | ICD-10-CM

## 2018-12-03 DIAGNOSIS — N18.30 TYPE 2 DIABETES MELLITUS WITH STAGE 3 CHRONIC KIDNEY DISEASE, WITHOUT LONG-TERM CURRENT USE OF INSULIN: Chronic | ICD-10-CM

## 2018-12-03 DIAGNOSIS — I10 ESSENTIAL HYPERTENSION, BENIGN: Chronic | ICD-10-CM

## 2018-12-03 DIAGNOSIS — J44.9 CHRONIC OBSTRUCTIVE PULMONARY DISEASE, UNSPECIFIED COPD TYPE: Chronic | ICD-10-CM

## 2018-12-03 DIAGNOSIS — M1A.9XX0 CHRONIC GOUT WITHOUT TOPHUS, UNSPECIFIED CAUSE, UNSPECIFIED SITE: Chronic | ICD-10-CM

## 2018-12-03 DIAGNOSIS — E66.9 OBESITY, CLASS I, BMI 30-34.9: ICD-10-CM

## 2018-12-03 DIAGNOSIS — I25.10 CORONARY ARTERY DISEASE INVOLVING NATIVE CORONARY ARTERY WITHOUT ANGINA PECTORIS, UNSPECIFIED WHETHER NATIVE OR TRANSPLANTED HEART: Chronic | ICD-10-CM

## 2018-12-03 DIAGNOSIS — Z00.00 ROUTINE MEDICAL EXAM: Primary | ICD-10-CM

## 2018-12-03 DIAGNOSIS — I70.0 ATHEROSCLEROSIS OF AORTA: Chronic | ICD-10-CM

## 2018-12-03 PROBLEM — B34.9 ACUTE VIRAL SYNDROME: Status: RESOLVED | Noted: 2018-08-22 | Resolved: 2018-12-03

## 2018-12-03 PROBLEM — E66.811 OBESITY, CLASS I, BMI 30-34.9: Chronic | Status: ACTIVE | Noted: 2018-12-03

## 2018-12-03 PROBLEM — E66.811 OBESITY, CLASS I, BMI 30-34.9: Status: ACTIVE | Noted: 2018-12-03

## 2018-12-03 LAB
25(OH)D3+25(OH)D2 SERPL-MCNC: 12 NG/ML
ALBUMIN SERPL BCP-MCNC: 3.7 G/DL
ALP SERPL-CCNC: 84 U/L
ALT SERPL W/O P-5'-P-CCNC: 20 U/L
ANION GAP SERPL CALC-SCNC: 8 MMOL/L
AST SERPL-CCNC: 26 U/L
BASOPHILS # BLD AUTO: 0.06 K/UL
BASOPHILS NFR BLD: 0.7 %
BILIRUB SERPL-MCNC: 1.5 MG/DL
BUN SERPL-MCNC: 26 MG/DL
CALCIUM SERPL-MCNC: 9.4 MG/DL
CHLORIDE SERPL-SCNC: 107 MMOL/L
CO2 SERPL-SCNC: 24 MMOL/L
CREAT SERPL-MCNC: 1.2 MG/DL
DIFFERENTIAL METHOD: ABNORMAL
EOSINOPHIL # BLD AUTO: 0.7 K/UL
EOSINOPHIL NFR BLD: 8 %
ERYTHROCYTE [DISTWIDTH] IN BLOOD BY AUTOMATED COUNT: 13.3 %
EST. GFR  (AFRICAN AMERICAN): >60 ML/MIN/1.73 M^2
EST. GFR  (NON AFRICAN AMERICAN): 58.4 ML/MIN/1.73 M^2
ESTIMATED AVG GLUCOSE: 232 MG/DL
GLUCOSE SERPL-MCNC: 93 MG/DL
HBA1C MFR BLD HPLC: 9.7 %
HCT VFR BLD AUTO: 43.8 %
HGB BLD-MCNC: 13.4 G/DL
IMM GRANULOCYTES # BLD AUTO: 0.04 K/UL
IMM GRANULOCYTES NFR BLD AUTO: 0.4 %
LYMPHOCYTES # BLD AUTO: 2.3 K/UL
LYMPHOCYTES NFR BLD: 25.8 %
MCH RBC QN AUTO: 29.5 PG
MCHC RBC AUTO-ENTMCNC: 30.6 G/DL
MCV RBC AUTO: 97 FL
MONOCYTES # BLD AUTO: 0.9 K/UL
MONOCYTES NFR BLD: 9.8 %
NEUTROPHILS # BLD AUTO: 5 K/UL
NEUTROPHILS NFR BLD: 55.3 %
NRBC BLD-RTO: 0 /100 WBC
PLATELET # BLD AUTO: 279 K/UL
PMV BLD AUTO: 10.4 FL
POTASSIUM SERPL-SCNC: 4.7 MMOL/L
PROT SERPL-MCNC: 8 G/DL
RBC # BLD AUTO: 4.54 M/UL
SODIUM SERPL-SCNC: 139 MMOL/L
WBC # BLD AUTO: 8.98 K/UL

## 2018-12-03 PROCEDURE — G0008 ADMIN INFLUENZA VIRUS VAC: HCPCS | Mod: HCNC,S$GLB,, | Performed by: INTERNAL MEDICINE

## 2018-12-03 PROCEDURE — 3075F SYST BP GE 130 - 139MM HG: CPT | Mod: CPTII,HCNC,S$GLB, | Performed by: INTERNAL MEDICINE

## 2018-12-03 PROCEDURE — 3078F DIAST BP <80 MM HG: CPT | Mod: CPTII,HCNC,S$GLB, | Performed by: INTERNAL MEDICINE

## 2018-12-03 PROCEDURE — 36415 COLL VENOUS BLD VENIPUNCTURE: CPT | Mod: HCNC,PO

## 2018-12-03 PROCEDURE — 90662 IIV NO PRSV INCREASED AG IM: CPT | Mod: HCNC,S$GLB,, | Performed by: INTERNAL MEDICINE

## 2018-12-03 PROCEDURE — 82306 VITAMIN D 25 HYDROXY: CPT | Mod: HCNC

## 2018-12-03 PROCEDURE — 99999 PR PBB SHADOW E&M-EST. PATIENT-LVL IV: CPT | Mod: PBBFAC,HCNC,, | Performed by: INTERNAL MEDICINE

## 2018-12-03 PROCEDURE — 99397 PER PM REEVAL EST PAT 65+ YR: CPT | Mod: 25,HCNC,S$GLB, | Performed by: INTERNAL MEDICINE

## 2018-12-03 PROCEDURE — 80053 COMPREHEN METABOLIC PANEL: CPT | Mod: HCNC

## 2018-12-03 PROCEDURE — 85025 COMPLETE CBC W/AUTO DIFF WBC: CPT | Mod: HCNC

## 2018-12-03 PROCEDURE — 83036 HEMOGLOBIN GLYCOSYLATED A1C: CPT | Mod: HCNC

## 2018-12-03 NOTE — PROGRESS NOTES
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             Cardiac/Vascular    Essential hypertension, benign (Chronic)    Current Assessment & Plan     The current medical regimen is effective;  continue present plan and medications.          Relevant Orders    CBC auto differential    Comprehensive metabolic panel    Hypertension [employer] [facility] ([employer]) Enrollment Order (Completed)    Hypertension Digital Medicine ([facility]): Assign Onboarding Questionnaires (Completed)    CAD (coronary artery disease) (Chronic)    Overview     S/p 2 stents around 06-07         Current Assessment & Plan     The current medical regimen is effective;  continue present plan and medications.          Mixed hyperlipidemia (Chronic)    Current Assessment & Plan     The current medical regimen is effective;  continue present plan and medications.          Atherosclerosis of aorta (Chronic)    Overview     CT chest April 2014.  Stable, asymptomatic chronic condition.  Will continue to maximize risk factor reduction and adjust medication as needed.             Endocrine    Type 2 diabetes mellitus with stage 3 chronic kidney disease, without long-term current use of insulin (Chronic)    Overview     Enrolled in digital DM         Current Assessment & Plan     Improving home readings.  Recheck labs         Relevant Orders    Hemoglobin A1c    Microalbumin/creatinine urine ratio    Vitamin D       Orthopedic    Gout, chronic (Chronic)    Current Assessment & Plan     The current  medical regimen is effective;  continue present plan and medications.            Other Visit Diagnoses     Routine medical exam    -  Primary  -    Discussed healthy diet, regular exercise, necessary labs, age appropriate cancer screening, and routine vaccinations.       Need for influenza vaccination    -  vaccinate    Relevant Orders    Influenza - High Dose (65+) (PF) (IM)            Health Maintenance reviewed, flu today.    Follow-up: Follow-up in about 6 months (around 6/3/2019) for follow up for chronic conditions, sooner as needed.    ______________________________________________________________________    Chief Complaint  Chief Complaint   Patient presents with    Annual Exam       HPI  James Mae Jr. is a 78 y.o. male with multiple medical diagnoses as listed in the medical history and problem list that presents for routine physical.  Pt is known to me with his last appointment 8/1/2018.      His wife accidentally threw accidentally threw away his medications and he was out for 4 days.  His wife has moved back in with him and he is the primary caregiver.  She is on home hospice at this time.      He is enrolled in [employer].  No acute complaints today.        PAST MEDICAL HISTORY:  Past Medical History:   Diagnosis Date    Asbestosis     COPD with asthma     Coronary artery disease     Diabetes mellitus type II     Hypertension        PAST SURGICAL HISTORY:  Past Surgical History:   Procedure Laterality Date    APPENDECTOMY      8 yrs ago    CHOLECYSTECTOMY      8 yrs ago    COLONOSCOPY Left 1/24/2013    Performed [facility] Tej Rangel MD [facility]    CORONARY STENT PLACEMENT      SMALL INTESTINE SURGERY      8 yrs       SOCIAL HISTORY:  Social History     Socioeconomic History    Marital status:      Spouse name: Not on file    Number of children: Not on file    Years of education: Not on file    Highest education level: Not on file   Social Needs    Financial resource  strain: Not on file    Food insecurity - worry: Not on file    Food insecurity - inability: Not on file    Transportation needs - medical: Not on file    Transportation needs - non-medical: Not on file   Occupational History    Not on file   Tobacco Use    Smoking status: Never Smoker    Smokeless tobacco: Never Used   Substance and Sexual Activity    Alcohol use: No    Drug use: No    Sexual activity: Yes     Partners: Female   Other Topics Concern    Not on file   Social History Narrative    Not on file       FAMILY HISTORY:  Family History   Problem Relation Age of Onset    Cancer Mother         throat    Heart disease Father         heart attack       ALLERGIES AND MEDICATIONS: updated and reviewed.  Review of patient's allergies indicates:  No Known Allergies  Current Outpatient Medications   Medication Sig Dispense Refill    albuterol (PROAIR HFA) 90 mcg/actuation inhaler Inhale 2 puffs into the lungs every 6 (six) hours as needed for Wheezing. 3 Inhaler 3    allopurinol (ZYLOPRIM) 300 MG tablet TAKE 1 TABLET ONE TIME DAILY 90 tablet 3    atorvastatin (LIPITOR) 40 MG tablet Take 1 tablet (40 mg total) by mouth once daily. 90 tablet 3    azelastine (ASTELIN) 137 mcg (0.1 %) nasal spray 2 sprays (274 mcg total) by Nasal route 2 (two) times daily. 30 mL 0    ergocalciferol (ERGOCALCIFEROL) 50,000 unit Cap Take 1 capsule PO ONCE A WEEK 12 capsule 4    fluticasone (FLONASE) 50 mcg/actuation nasal spray 2 sprays (100 mcg total) by Each Nare route once daily. 15 g 0    fluticasone-vilanterol (BREO ELLIPTA) 200-25 mcg/dose DsDv diskus inhaler Inhale 1 puff into the lungs once daily. 60 each 5    glipiZIDE (GLUCOTROL) 5 MG tablet Take 2 tabs PO QAM and 1 tab Po  tablet 3    ibuprofen (ADVIL,MOTRIN) 600 MG tablet Take 1 tablet (600 mg total) by mouth 3 (three) times daily. 30 tablet 0    lancets (ONETOUCH ULTRASOFT LANCETS) Misc Fastclix Lancets. Test  each 11    losartan (COZAAR)  25 MG tablet Take 1 tablet (25 mg total) by mouth once daily. 90 tablet 3    metFORMIN (GLUCOPHAGE-XR) 500 MG 24 hr tablet Take 2 tablets (1,000 mg total) by mouth 2 (two) times daily with meals. 270 tablet 1    metoprolol succinate (TOPROL-XL) 50 MG 24 hr tablet Take 1 tablet (50 mg total) by mouth once daily. 90 tablet 3    nitroGLYCERIN (NITROSTAT) 0.4 MG SL tablet Place 1 tablet (0.4 mg total) under the tongue every 5 (five) minutes as needed for Chest pain. 25 tablet 11    aspirin (ECOTRIN) 81 MG EC tablet Take 1 tablet (81 mg total) by mouth once daily. (Patient taking differently: Take 162 mg by mouth once daily. ) 90 tablet 0    blood sugar diagnostic (BLOOD GLUCOSE TEST) Strp Accu- Check  Smartview Test Strips. Test  strip 11    meclizine (ANTIVERT) 25 mg tablet Take 1 tablet (25 mg total) by mouth 3 (three) times daily as needed for Dizziness. 90 tablet 0     No current facility-administered medications for this visit.          ROS  Review of Systems   Constitutional: Negative for chills, fever and unexpected weight change.   HENT: Negative for congestion, dental problem, ear pain, hearing loss, rhinorrhea, sore throat and trouble swallowing.    Eyes: Negative for discharge, redness and visual disturbance.   Respiratory: Negative for cough, chest tightness, shortness of breath and wheezing.    Cardiovascular: Negative for chest pain, palpitations and leg swelling.   Gastrointestinal: Negative for abdominal pain, constipation, diarrhea, nausea and vomiting.   Endocrine: Negative for polydipsia, polyphagia and polyuria.   Genitourinary: Negative for decreased urine volume, dysuria and hematuria.   Musculoskeletal: Negative for arthralgias and myalgias.   Skin: Negative for color change and rash.   Neurological: Negative for dizziness, weakness, light-headedness and headaches.   Psychiatric/Behavioral: Negative for decreased concentration, dysphoric mood, sleep disturbance and suicidal ideas.  "          Physical Exam  Vitals:    12/03/18 0814 12/03/18 0842   BP: (!) 140/70 136/70   Pulse: 82    Temp: 98.3 °F (36.8 °C)    SpO2: 95%    Weight: 95.3 kg (210 lb)    Height: 5' 10" (1.778 m)     Body mass index is 30.13 kg/m².  Weight: 95.3 kg (210 lb)   Height: 5' 10" (177.8 cm)   Physical Exam   Constitutional: He is oriented to person, place, and time. He appears well-developed and well-nourished. No distress.   HENT:   Head: Normocephalic and atraumatic.   Right Ear: Tympanic membrane, external ear and ear canal normal.   Left Ear: Tympanic membrane, external ear and ear canal normal.   Nose: Nose normal. No septal deviation. Right sinus exhibits no maxillary sinus tenderness and no frontal sinus tenderness. Left sinus exhibits no maxillary sinus tenderness and no frontal sinus tenderness.   Mouth/Throat: Uvula is midline, oropharynx is clear and moist and mucous membranes are normal. No tonsillar exudate.   Eyes: Conjunctivae and EOM are normal. Pupils are equal, round, and reactive to light. Right eye exhibits no discharge. Left eye exhibits no discharge. No scleral icterus.   Neck: Neck supple. No JVD present. No spinous process tenderness and no muscular tenderness present. Carotid bruit is not present. No tracheal deviation present. No thyroid mass and no thyromegaly present.   Cardiovascular: Normal rate, regular rhythm, normal heart sounds and intact distal pulses. Exam reveals no S3, no S4 and no friction rub.   Pulmonary/Chest: Effort normal and breath sounds normal. He has no wheezes. He has no rhonchi. He has no rales.   Abdominal: Soft. Bowel sounds are normal. He exhibits no distension. There is no tenderness. There is no rebound, no guarding and no CVA tenderness.   Musculoskeletal: Normal range of motion. He exhibits no edema or tenderness.   Lymphadenopathy:        Head (right side): No submental and no submandibular adenopathy present.        Head (left side): No submental and no " submandibular adenopathy present.     He has no cervical adenopathy.   Neurological: He is alert and oriented to person, place, and time. Coordination normal.   Motor grossly intact.  Sensation grossly normal.  Symmetric facial movements palate elevated symmetrically tongue midline    Skin: Skin is warm and dry. Capillary refill takes less than 2 seconds. No rash noted. No cyanosis. Nails show no clubbing.   Psychiatric: He has a normal mood and affect. His speech is normal and behavior is normal. Thought content normal. Cognition and memory are normal.         Health Maintenance       Date Due Completion Date    TETANUS VACCINE 05/31/1960 ---    Zoster Vaccine 05/31/2002 ---    Influenza Vaccine 08/01/2018 11/27/2017    Eye Exam 08/10/2018 8/10/2017    Override on 3/15/2016: Done ([facility]- Jas Cooper MD-No diabetic retinopathy detected-Comments: Cataracts. Patient should return for re-evaluation in 12 months.)    Override on 11/20/2014: Done (cataracts.  No DM change)    Hemoglobin A1c 11/27/2018 8/27/2018    Foot Exam 05/28/2019 5/28/2018    Lipid Panel 05/29/2019 5/29/2018

## 2018-12-05 NOTE — PROGRESS NOTES
Your lab results are generally looking stable except for the sugars.  Please be sure to stay in touch with the digital DM team.  Please feel free to contact me with any questions or concerns.    Sincerely,  Deepak Bee  [email or web address]

## 2018-12-10 ENCOUNTER — PATIENT OUTREACH (OUTPATIENT)
Dept: OTHER | Facility: OTHER | Age: 78
End: 2018-12-10

## 2018-12-10 DIAGNOSIS — E11.22 TYPE 2 DIABETES MELLITUS WITH STAGE 3 CHRONIC KIDNEY DISEASE, WITHOUT LONG-TERM CURRENT USE OF INSULIN: Primary | Chronic | ICD-10-CM

## 2018-12-10 DIAGNOSIS — N18.30 TYPE 2 DIABETES MELLITUS WITH STAGE 3 CHRONIC KIDNEY DISEASE, WITHOUT LONG-TERM CURRENT USE OF INSULIN: Primary | Chronic | ICD-10-CM

## 2018-12-10 NOTE — PROGRESS NOTES
Last 5 Patient Entered Readings                                      Current 30 Day Average: 136/86     Recent Readings 12/5/2018 12/5/2018 12/5/2018 12/5/2018 12/3/2018    SBP (mmHg) 128 128 141 141 143    DBP (mmHg) 65 65 82 82 80    Pulse 77 77 73 73 80          Last 6 Patient Entered Readings                                          Most Recent A1c: 9.7% on 12/3/2018  (Goal: 8%)     Recent Readings 12/10/2018 12/9/2018 12/8/2018 12/5/2018 12/4/2018    Blood Glucose (mg/dL) 112 188 70 81 352          Digital Medicine: Health  Follow Up    Lifestyle Modifications:    1.Dietary Modifications (Sodium intake <2,000mg/day, food labels, dining out): Patient stated that he has been doing better with his diet and lost 10 lbs. He has cut out chocolate and the other sweets he was eating.     2.Physical Activity: Deferred    3.Medication Therapy: Patient is confused about his diabetes medication regimen and needs clarification. His doctor adjusted his meds last week (increased the metformin) and since then, his BS dropped (12/8) and he was feeling shaky and started sweating. He is not sure if he should take the two metformins anymore because of this. I will discuss this with his PharmD, ALEJANDRO Jones, and we will follow up with the patient today with further instructions.     4.Patient has the following medication side effects/concerns:   (Frequency/Alleviating factors/Precipitating factors, etc.)     Patient stated that he ate two sandwiches after he saw his BS was low on 12/8 and he started feeling much better. I reviewed better choices for fast acting carbs and the protocol to follow when trying to get his BS back up to normal range.     Will discuss HTN program on our next call.     Follow up with Mr. James Mae Jr. completed. No further questions or concerns. Will continue to follow up to achieve health goals.

## 2018-12-10 NOTE — PROGRESS NOTES
1. HTN  HPI:  Called patient to welcome him to the [facility]. Reviewed my role and responsibilities.   Patient endorses adherence to medication regimen daily and denies missed doses. Patient denies hypotensive s/sx (lightheadedness, dizziness, nausea, fatigue); patient denies hypertensive s/sx (SOB, CP, severe headaches, changes in vision, dizziness, fatigue, confusion, anxiety, nosebleeds).     Last 5 Patient Entered Readings                                      Current 30 Day Average: 136/86     Recent Readings 12/5/2018 12/5/2018 12/5/2018 12/5/2018 12/3/2018    SBP (mmHg) 128 128 141 141 143    DBP (mmHg) 65 65 82 82 80    Pulse 77 77 73 73 80        Assessment:  Reviewed recent readings. Per 2017 ACC/ AHA HTN guidelines (goal of BP < 130/80), current 30-day average is slightly uncontrolled. Patient has taken BP digitally for 2 days.     Plan:  Continue current medication regimen. Patients health , Ana Rosa Cason, will be following up every 3-4 weeks. I will continue to monitor regularly and will follow-up in 1 to 2 weeks, sooner if blood pressure begins to trend upward or downward. If BP remains elevated, will discuss increasing losartan to 50mg.     Current medication regimen:  Hypertension Medications             losartan (COZAAR) 25 MG tablet Take 1 tablet (25 mg total) by mouth once daily.    metoprolol succinate (TOPROL-XL) 50 MG 24 hr tablet Take 1 tablet (50 mg total) by mouth once daily.    nitroGLYCERIN (NITROSTAT) 0.4 MG SL tablet Place 1 tablet (0.4 mg total) under the tongue every 5 (five) minutes as needed for Chest pain.        Patient denies having questions or concerns. Patient has my contact information and knows to call with any concerns or clinical changes.    2. DM  HPI:  Called patient to follow up regarding the DDMP (Diabetes Digital Medicine Program).  Patient endorses adherence to medication regimen daily and denies missed doses.   Patient admits to hypoglycemic s/sx (dizziness,  extreme hunger, headaches, confusion, trouble concentrating, sweating, shaking, blurred vision, personality changes), particularly on 12/8, BG was 70. Patient confirms he quickly ate a [employer] sandwich, but did not retest his BG.   Patient denies hyperglycemic s/sx (increased thirst, increased urination, headaches, trouble concentrating, blurred vision, fatigue).  Patient reports he was seen by PCP on 12/3. Patient states he was instructed to increase glipizide to 10mg BID and metformin to 1000mg BID.     2. DM  Last 6 Patient Entered Readings                                          Most Recent A1c: 9.7% on 12/3/2018  (Goal: 8%)     Recent Readings 12/10/2018 12/9/2018 12/8/2018 12/5/2018 12/4/2018    Blood Glucose (mg/dL) 112 188 70 81 352     Assessment:  Reviewed recent readings. Per AACE/ACE guidelines (goal A1C < 7%), DM is uncontrolled (A1C on 12/3 was 9.7%). Next A1C will be due in 3/2019.    Plan:  Discussed with and instructed patient to decrease glipizide back to 10/5 and to remain on metformin 1000mg BID. Patients health , Ana Rosa Cason, will be following up every 3-4 weeks. I will continue to monitor regularly and will follow-up in 1 to 2 weeks, sooner if blood sugar begins to trend upward or downward.     Current Medication Regimen:  Diabetes Medications             glipiZIDE (GLUCOTROL) 5 MG tablet Take 2 tabs PO QAM and 1 tab Po QPM    metFORMIN (GLUCOPHAGE-XR) 500 MG 24 hr tablet Take 2 tablets (1,000 mg total) by mouth 2 (two) times daily with meals.        Patient has my contact information and knows to call with any concerns or clinical changes.

## 2018-12-12 NOTE — PROGRESS NOTES
Last 5 Patient Entered Readings                                      Current 30 Day Average: 140/82     Recent Readings 12/12/2018 12/12/2018 12/12/2018 12/12/2018 12/12/2018    SBP (mmHg) 131 131 135 135 149    DBP (mmHg) 72 72 77 77 79    Pulse 75 75 75 75 77          Last 6 Patient Entered Readings                                          Most Recent A1c: 9.7% on 12/3/2018  (Goal: 8%)     Recent Readings 12/12/2018 12/10/2018 12/9/2018 12/8/2018 12/5/2018    Blood Glucose (mg/dL) 98 112 188 70 81          Patient called to let me know that his wife's nurse used his BP machine twice today because she wanted to see how it worked. The reading of 135/77 and 131/72 were readings from the nurse. He will make sure not to let anyone use his cuff anymore.

## 2018-12-27 ENCOUNTER — PATIENT OUTREACH (OUTPATIENT)
Dept: OTHER | Facility: OTHER | Age: 78
End: 2018-12-27

## 2018-12-27 DIAGNOSIS — I10 ESSENTIAL HYPERTENSION, BENIGN: ICD-10-CM

## 2018-12-27 DIAGNOSIS — E11.22 TYPE 2 DIABETES MELLITUS WITH STAGE 3 CHRONIC KIDNEY DISEASE, WITHOUT LONG-TERM CURRENT USE OF INSULIN: Chronic | ICD-10-CM

## 2018-12-27 DIAGNOSIS — N18.30 TYPE 2 DIABETES MELLITUS WITH STAGE 3 CHRONIC KIDNEY DISEASE, WITHOUT LONG-TERM CURRENT USE OF INSULIN: Chronic | ICD-10-CM

## 2018-12-27 RX ORDER — LOSARTAN POTASSIUM 50 MG/1
50 TABLET ORAL DAILY
Qty: 90 TABLET | Refills: 3 | Status: SHIPPED | OUTPATIENT
Start: 2018-12-27 | End: 2019-04-03

## 2018-12-27 RX ORDER — METFORMIN HYDROCHLORIDE 500 MG/1
1000 TABLET, EXTENDED RELEASE ORAL 2 TIMES DAILY WITH MEALS
Qty: 270 TABLET | Refills: 1 | Status: SHIPPED | OUTPATIENT
Start: 2018-12-27 | End: 2019-01-16 | Stop reason: SDUPTHER

## 2018-12-27 NOTE — PROGRESS NOTES
1. HTN  HPI:  Called patient to follow up. Patient endorses adherence to medication regimen daily and denies missed doses. Patient denies hypotensive s/sx (lightheadedness, dizziness, nausea, fatigue); patient denies hypertensive s/sx (SOB, CP, severe headaches, changes in vision, dizziness, fatigue, confusion, anxiety, nosebleeds).     Last 5 Patient Entered Readings                                      Current 30 Day Average: 138/79     Recent Readings 12/22/2018 12/22/2018 12/22/2018 12/22/2018 12/21/2018    SBP (mmHg) 130 130 132 132 134    DBP (mmHg) 74 74 76 76 73    Pulse 82 82 85 85 73        Assessment:  Reviewed recent readings. Per 2017 ACC/ AHA HTN guidelines (goal of BP < 130/80), current 30-day average is slightly uncontrolled.     Plan:  Discussed with and instructed patient to increase losartan to 50mg, patient confirms understanding. Patients health , Ana Rosa Cason, will be following up as scheduled. I will continue to monitor regularly and will follow-up in 2 weeks, sooner if blood pressure begins to trend upward or downward.     Current medication regimen:  Hypertension Medications             losartan (COZAAR) 50 MG tablet Take 1 tablet (50 mg total) by mouth once daily.    metoprolol succinate (TOPROL-XL) 50 MG 24 hr tablet Take 1 tablet (50 mg total) by mouth once daily.    nitroGLYCERIN (NITROSTAT) 0.4 MG SL tablet Place 1 tablet (0.4 mg total) under the tongue every 5 (five) minutes as needed for Chest pain.          Patient denies having questions or concerns. Patient has my contact information and knows to call with any concerns or clinical changes.    2. DM  HPI:  Called patient to follow up regarding the DDMP (Diabetes Digital Medicine Program).  Patient endorses adherence to medication regimen daily and denies missed doses.   Patient denies hypoglycemic s/sx (dizziness, extreme hunger, headaches, confusion, trouble concentrating, sweating, shaking, blurred vision, personality  changes); patient denies hyperglycemic s/sx (increased thirst, increased urination, headaches, trouble concentrating, blurred vision, fatigue).    Last 6 Patient Entered Readings                                          Most Recent A1c: 9.7% on 12/3/2018  (Goal: 8%)     Recent Readings 12/22/2018 12/21/2018 12/20/2018 12/17/2018 12/16/2018    Blood Glucose (mg/dL) 139 92 120 173 100        Assessment:  Reviewed recent readings. Per AACE/ACE guidelines (goal A1C < 7%), DM is uncontrolled (A1C on 12/3 was 9.7%). Next A1C will be due in 3/2019.    Plan:  Discussed with and instructed patient to start Januvia 100, patient is not interested in starting injections (GLP). Once patient starts Januvia, patient will be tapered off of glipizide, patient confirms understanding. Patients health , Ana Rosa Cason, will be following up as scheduled. I will continue to monitor regularly and will follow-up in 2 weeks, sooner if blood sugar begins to trend upward or downward. If BG remains uncontrolled, will discuss adding Jardiance.     Current Medication Regimen:  Diabetes Medications             glipiZIDE (GLUCOTROL) 5 MG tablet Take 2 tabs PO QAM and 1 tab Po QPM    metFORMIN (GLUCOPHAGE-XR) 500 MG 24 hr tablet Take 2 tablets (1,000 mg total) by mouth 2 (two) times daily with meals.    SITagliptin (JANUVIA) 100 MG Tab Take 1 tablet (100 mg total) by mouth once daily.        Patient has my contact information and knows to call with any concerns or clinical changes.

## 2019-01-07 ENCOUNTER — PATIENT OUTREACH (OUTPATIENT)
Dept: OTHER | Facility: OTHER | Age: 79
End: 2019-01-07

## 2019-01-07 NOTE — PROGRESS NOTES
Last 5 Patient Entered Readings                                      Current 30 Day Average: 138/75     Recent Readings 1/6/2019 1/6/2019 1/6/2019 1/6/2019 1/5/2019    SBP (mmHg) 131 131 139 139 129    DBP (mmHg) 67 67 73 73 65    Pulse 65 65 63 63 71          Last 6 Patient Entered Readings                                          Most Recent A1c: 9.7% on 12/3/2018  (Goal: 8%)     Recent Readings 1/6/2019 1/5/2019 1/4/2019 12/31/2018 12/30/2018    Blood Glucose (mg/dL) 115 101 123 108 88          Patient called concerning his Diabetes medication. He is out of the Metformin and the Januvia never arrived either. His scripts were sent into [facility] mail order on 12/27/2018. His PharmD, ALEJANDRO Jones, is out of the office today so [employer] assisted and called [facility]. They informed her that the co-pay for Januvia is $131 and that once he pays that, they will send that medication along with the Metformin (they did not mail out the Metformin due to the issues with Januvia).     I spoke with patient the above information and he stated that he is going to call another pharmacy to see if they will cover it or have it for a cheaper cost. He stated he will call me back once he has that information.     2:12pm - Patient called back to let me know that he spoke with his insurance and he was able to get his co-pay down to $10 so they will ship it out and he should get it in 2 days. I will let his PharmD, ALEJANDRO Jones, know this information.

## 2019-01-10 ENCOUNTER — PATIENT OUTREACH (OUTPATIENT)
Dept: OTHER | Facility: OTHER | Age: 79
End: 2019-01-10

## 2019-01-16 DIAGNOSIS — E11.22 TYPE 2 DIABETES MELLITUS WITH STAGE 3 CHRONIC KIDNEY DISEASE, WITHOUT LONG-TERM CURRENT USE OF INSULIN: Chronic | ICD-10-CM

## 2019-01-16 DIAGNOSIS — N18.30 TYPE 2 DIABETES MELLITUS WITH STAGE 3 CHRONIC KIDNEY DISEASE, WITHOUT LONG-TERM CURRENT USE OF INSULIN: Chronic | ICD-10-CM

## 2019-01-16 RX ORDER — METFORMIN HYDROCHLORIDE 500 MG/1
1000 TABLET, EXTENDED RELEASE ORAL 2 TIMES DAILY WITH MEALS
Qty: 270 TABLET | Refills: 1 | Status: SHIPPED | OUTPATIENT
Start: 2019-01-16 | End: 2019-07-18 | Stop reason: SDUPTHER

## 2019-01-31 ENCOUNTER — PATIENT OUTREACH (OUTPATIENT)
Dept: OTHER | Facility: OTHER | Age: 79
End: 2019-01-31

## 2019-01-31 DIAGNOSIS — E11.22 TYPE 2 DIABETES MELLITUS WITH STAGE 3 CHRONIC KIDNEY DISEASE, WITHOUT LONG-TERM CURRENT USE OF INSULIN: Primary | Chronic | ICD-10-CM

## 2019-01-31 DIAGNOSIS — N18.30 TYPE 2 DIABETES MELLITUS WITH STAGE 3 CHRONIC KIDNEY DISEASE, WITHOUT LONG-TERM CURRENT USE OF INSULIN: Primary | Chronic | ICD-10-CM

## 2019-01-31 NOTE — PROGRESS NOTES
1. HTN  Last 5 Patient Entered Readings                                      Current 30 Day Average: 139/75     Recent Readings 1/31/2019 1/31/2019 1/31/2019 1/31/2019 1/29/2019    SBP (mmHg) 152 152 153 153 133    DBP (mmHg) 87 87 81 81 69    Pulse 63 63 68 68 68        Hypertension Medications             losartan (COZAAR) 50 MG tablet Take 1 tablet (50 mg total) by mouth once daily.    metoprolol succinate (TOPROL-XL) 50 MG 24 hr tablet Take 1 tablet (50 mg total) by mouth once daily.    nitroGLYCERIN (NITROSTAT) 0.4 MG SL tablet Place 1 tablet (0.4 mg total) under the tongue every 5 (five) minutes as needed for Chest pain.        Plan:   Called patient to follow up, reviewed BP readings. Per 2017 ACC/ AHA HTN guidelines  (goal of BP < 130/80), current 30-day average is well controlled.  VM is not set up.  Will continue to monitor. WCB in 3 weeks.     2. DM  Last 6 Patient Entered Readings                                          Most Recent A1c: 9.7% on 12/3/2018  (Goal: 8%)     Recent Readings 1/31/2019 1/29/2019 1/28/2019 1/25/2019 1/25/2019    Blood Glucose (mg/dL) 104 122 131 97 139        Diabetes Medications             glipiZIDE (GLUCOTROL) 5 MG tablet Take 2 tabs PO QAM and 1 tab Po QPM    metFORMIN (GLUCOPHAGE-XR) 500 MG 24 hr tablet Take 2 tablets (1,000 mg total) by mouth 2 (two) times daily with meals.    SITagliptin (JANUVIA) 100 MG Tab Take 1 tablet (100 mg total) by mouth once daily.        Plan:   Called to follow up with patient, reviewed recent readings. Per AACE/ACE guidelines (goal A1C < 7%), DM is uncontrolled (A1C on 12/3 was 9.7%); however, recent [facility] readings look improved.  VM is not set up.  Will continue to monitor. WCB in 3 weeks.

## 2019-02-07 ENCOUNTER — PATIENT OUTREACH (OUTPATIENT)
Dept: OTHER | Facility: OTHER | Age: 79
End: 2019-02-07

## 2019-02-07 NOTE — PROGRESS NOTES
Last 5 Patient Entered Readings                                      Current 30 Day Average: 139/76     Recent Readings 2/6/2019 2/6/2019 2/6/2019 2/6/2019 1/31/2019    SBP (mmHg) 147 147 149 149 152    DBP (mmHg) 85 85 78 78 87    Pulse 68 68 69 69 63          Last 6 Patient Entered Readings                                          Most Recent A1c: 9.7% on 12/3/2018  (Goal: 8%)     Recent Readings 2/6/2019 1/31/2019 1/29/2019 1/28/2019 1/25/2019    Blood Glucose (mg/dL) 103 104 122 131 97            Digital Medicine: Health  Follow Up    Attempted to leave a voicemail to follow up with Mr. James Mae Jr. but was unable to (will try back at a later time).  Current BP average 139/76 mmHg is not at goal, <130/80

## 2019-02-07 NOTE — PROGRESS NOTES
Last 5 Patient Entered Readings                                      Current 30 Day Average: 139/76     Recent Readings 2/6/2019 2/6/2019 2/6/2019 2/6/2019 1/31/2019    SBP (mmHg) 147 147 149 149 152    DBP (mmHg) 85 85 78 78 87    Pulse 68 68 69 69 63          Last 6 Patient Entered Readings                                          Most Recent A1c: 9.7% on 12/3/2018  (Goal: 8%)     Recent Readings 2/6/2019 1/31/2019 1/29/2019 1/28/2019 1/25/2019    Blood Glucose (mg/dL) 103 104 122 131 97          Digital Medicine: Health  Follow Up    Lifestyle Modifications:    1.Dietary Modifications (Sodium intake <2,000mg/day, food labels, dining out): Patient stated that he has started cooking more at home and he has stopped drinking soda and cut back on sweets.     2.Physical Activity: Deferred    3.Medication Therapy: Patient has been compliant with the medication regimen. Patient is doing well on his current regimen. He has been trying to take it at his scheduled times but sometimes he cannot always follow this (he is caring for his wife and sometimes gets side tracked with assisting her and giving her her medication - he has not missed taking any of his meds).     4.Patient has the following medication side effects/concerns:   (Frequency/Alleviating factors/Precipitating factors, etc.)     Patient is a bit overwhelmed today. His wife is doing ok but had an episode last night (he thinks it was a seizure). She is still on hospice at home. Patient has a lot on his plate but he is doing his best to manage it.     Follow up with Mr. James Mae Jr. completed. No further questions or concerns. Will continue to follow up to achieve health goals.

## 2019-02-13 ENCOUNTER — OFFICE VISIT (OUTPATIENT)
Dept: FAMILY MEDICINE | Facility: CLINIC | Age: 79
End: 2019-02-13
Payer: MEDICARE

## 2019-02-13 ENCOUNTER — LAB VISIT (OUTPATIENT)
Dept: LAB | Facility: HOSPITAL | Age: 79
End: 2019-02-13
Payer: MEDICARE

## 2019-02-13 VITALS
OXYGEN SATURATION: 97 % | BODY MASS INDEX: 29.89 KG/M2 | HEART RATE: 82 BPM | SYSTOLIC BLOOD PRESSURE: 120 MMHG | TEMPERATURE: 98 F | WEIGHT: 208.75 LBS | DIASTOLIC BLOOD PRESSURE: 80 MMHG | HEIGHT: 70 IN

## 2019-02-13 DIAGNOSIS — K92.1 HEMATOCHEZIA: Primary | ICD-10-CM

## 2019-02-13 DIAGNOSIS — K92.1 HEMATOCHEZIA: ICD-10-CM

## 2019-02-13 LAB
BASOPHILS # BLD AUTO: 0.09 K/UL
BASOPHILS NFR BLD: 0.9 %
DIFFERENTIAL METHOD: ABNORMAL
EOSINOPHIL # BLD AUTO: 0.5 K/UL
EOSINOPHIL NFR BLD: 5.1 %
ERYTHROCYTE [DISTWIDTH] IN BLOOD BY AUTOMATED COUNT: 13.5 %
HCT VFR BLD AUTO: 38.8 %
HGB BLD-MCNC: 12.4 G/DL
IMM GRANULOCYTES # BLD AUTO: 0.03 K/UL
IMM GRANULOCYTES NFR BLD AUTO: 0.3 %
LYMPHOCYTES # BLD AUTO: 2.8 K/UL
LYMPHOCYTES NFR BLD: 28.6 %
MCH RBC QN AUTO: 31.5 PG
MCHC RBC AUTO-ENTMCNC: 32 G/DL
MCV RBC AUTO: 99 FL
MONOCYTES # BLD AUTO: 0.9 K/UL
MONOCYTES NFR BLD: 9.1 %
NEUTROPHILS # BLD AUTO: 5.4 K/UL
NEUTROPHILS NFR BLD: 56 %
NRBC BLD-RTO: 0 /100 WBC
PLATELET # BLD AUTO: 286 K/UL
PMV BLD AUTO: 10.1 FL
RBC # BLD AUTO: 3.94 M/UL
WBC # BLD AUTO: 9.64 K/UL

## 2019-02-13 PROCEDURE — 99999 PR PBB SHADOW E&M-EST. PATIENT-LVL V: ICD-10-PCS | Mod: PBBFAC,,, | Performed by: NURSE PRACTITIONER

## 2019-02-13 PROCEDURE — 1101F PT FALLS ASSESS-DOCD LE1/YR: CPT | Mod: CPTII,S$GLB,, | Performed by: NURSE PRACTITIONER

## 2019-02-13 PROCEDURE — 3079F PR MOST RECENT DIASTOLIC BLOOD PRESSURE 80-89 MM HG: ICD-10-PCS | Mod: CPTII,S$GLB,, | Performed by: NURSE PRACTITIONER

## 2019-02-13 PROCEDURE — 3074F SYST BP LT 130 MM HG: CPT | Mod: CPTII,S$GLB,, | Performed by: NURSE PRACTITIONER

## 2019-02-13 PROCEDURE — 85025 COMPLETE CBC W/AUTO DIFF WBC: CPT | Mod: HCNC

## 2019-02-13 PROCEDURE — 3079F DIAST BP 80-89 MM HG: CPT | Mod: CPTII,S$GLB,, | Performed by: NURSE PRACTITIONER

## 2019-02-13 PROCEDURE — 36415 COLL VENOUS BLD VENIPUNCTURE: CPT | Mod: HCNC,PO

## 2019-02-13 PROCEDURE — 99214 OFFICE O/P EST MOD 30 MIN: CPT | Mod: S$GLB,,, | Performed by: NURSE PRACTITIONER

## 2019-02-13 PROCEDURE — 99999 PR PBB SHADOW E&M-EST. PATIENT-LVL V: CPT | Mod: PBBFAC,,, | Performed by: NURSE PRACTITIONER

## 2019-02-13 PROCEDURE — 99214 PR OFFICE/OUTPT VISIT, EST, LEVL IV, 30-39 MIN: ICD-10-PCS | Mod: S$GLB,,, | Performed by: NURSE PRACTITIONER

## 2019-02-13 PROCEDURE — 3074F PR MOST RECENT SYSTOLIC BLOOD PRESSURE < 130 MM HG: ICD-10-PCS | Mod: CPTII,S$GLB,, | Performed by: NURSE PRACTITIONER

## 2019-02-13 PROCEDURE — 1101F PR PT FALLS ASSESS DOC 0-1 FALLS W/OUT INJ PAST YR: ICD-10-PCS | Mod: CPTII,S$GLB,, | Performed by: NURSE PRACTITIONER

## 2019-02-13 NOTE — PROGRESS NOTES
Subjective:       Patient ID: James Mae Jr. is a 78 y.o. male.    Chief Complaint: Rectal Bleeding (dark blood in s tool for one week)            Rectal Bleeding   This is a recurrent problem. The current episode started in the past 7 days. The problem occurs intermittently. The problem has been resolved. Associated symptoms include abdominal pain and a change in bowel habit (watery dark stool ). Pertinent negatives include no anorexia, arthralgias, chest pain, chills, congestion, coughing, diaphoresis, fever, headaches, joint swelling, myalgias, nausea, neck pain, numbness, rash, sore throat, swollen glands, urinary symptoms, vertigo, visual change, vomiting or weakness. Nothing aggravates the symptoms. He has tried nothing for the symptoms.     Review of Systems   Constitutional: Negative for chills, diaphoresis and fever.   HENT: Negative for congestion and sore throat.    Respiratory: Negative for cough.    Cardiovascular: Negative for chest pain.   Gastrointestinal: Positive for abdominal pain, anal bleeding, blood in stool, change in bowel habit (watery dark stool ), diarrhea and hematochezia. Negative for anorexia, constipation, nausea, rectal pain and vomiting.   Musculoskeletal: Negative for arthralgias, back pain, joint swelling, myalgias and neck pain.   Skin: Negative for rash.   Neurological: Negative for vertigo, weakness, numbness and headaches.       Objective:      Physical Exam   Constitutional: He is oriented to person, place, and time. Vital signs are normal. He appears well-developed and well-nourished.   HENT:   Head: Normocephalic and atraumatic.   Right Ear: External ear normal.   Left Ear: External ear normal.   Nose: Nose normal.   Mouth/Throat: Oropharynx is clear and moist. No oropharyngeal exudate.   Cardiovascular: Normal rate, regular rhythm and normal heart sounds.   Pulmonary/Chest: Effort normal and breath sounds normal.   Abdominal: Soft. Bowel sounds are normal.    Neurological: He is alert and oriented to person, place, and time.   Skin: Skin is warm, dry and intact.   Psychiatric: He has a normal mood and affect.       Assessment:       1. Hematochezia        Plan:       James was seen today for rectal bleeding.    Diagnoses and all orders for this visit:    Hematochezia  -     Occult blood x 1, stool; Future

## 2019-02-20 ENCOUNTER — PES CALL (OUTPATIENT)
Dept: ADMINISTRATIVE | Facility: CLINIC | Age: 79
End: 2019-02-20

## 2019-02-20 ENCOUNTER — PATIENT OUTREACH (OUTPATIENT)
Dept: ADMINISTRATIVE | Facility: HOSPITAL | Age: 79
End: 2019-02-20

## 2019-02-21 NOTE — PROGRESS NOTES
1. HTN  HPI:  Called patient to follow up. Patient reports adherence to medication regimen daily and denies missed doses. Patient admits to occasional hypotensive s/sx upon standing (lightheadedness, dizziness, nausea, fatigue); patient denies hypertensive s/sx (SOB, CP, severe headaches, changes in vision, dizziness, fatigue, confusion, anxiety, nosebleeds).     Last 5 Patient Entered Readings                                      Current 30 Day Average: 138/74     Recent Readings 2/21/2019 2/21/2019 2/21/2019 2/21/2019 2/18/2019    SBP (mmHg) 139 139 142 142 129    DBP (mmHg) 72 72 77 77 69    Pulse 77 77 77 77 74        Assessment:  Reviewed recent readings. Per 2017 ACC/ AHA HTN guidelines (goal of BP < 130/80), current 30-day average is slightly uncontrolled, remains stable.     Plan:  Due to occasional postural hypotensive s/sx, will not increase losartan to 100mg; continue current medication regimen.   Patients health , Ana Rosa Cason, will be following up as scheduled.   I will continue to monitor regularly and will follow-up in 6 weeks, sooner if blood pressure begins to trend upward or downward.     Current medication regimen:  Hypertension Medications             losartan (COZAAR) 50 MG tablet Take 1 tablet (50 mg total) by mouth once daily.    metoprolol succinate (TOPROL-XL) 50 MG 24 hr tablet Take 1 tablet (50 mg total) by mouth once daily.    nitroGLYCERIN (NITROSTAT) 0.4 MG SL tablet Place 1 tablet (0.4 mg total) under the tongue every 5 (five) minutes as needed for Chest pain.         Patient denies having questions or concerns. Patient has my contact information and knows to call with any concerns or clinical changes.       2. DM  HPI:  Called patient to follow up regarding the DDMP (Diabetes Digital Medicine Program).  Patient reports adherence to medication regimen daily and denies missed doses.   Patient denies hypoglycemic s/sx (dizziness, extreme hunger, headaches, confusion, trouble  concentrating, sweating, shaking, blurred vision, personality changes); patient denies hyperglycemic s/sx (increased thirst, increased urination, headaches, trouble concentrating, blurred vision, fatigue).    Last 6 Patient Entered Readings                                          Most Recent A1c: 9.7% on 12/3/2018  (Goal: 8%)     Recent Readings 2/21/2019 2/18/2019 2/14/2019 2/12/2019 2/11/2019    Blood Glucose (mg/dL) 121 108 107 108 157        Assessment:  Reviewed recent readings. Per AACE/ACE guidelines (goal A1C < 7%), DM is uncontrolled (A1C on 12/3 was 9.7%). Next A1C will be due in 3/2019. Requested patient have drawn on 3/4.    Plan:  Continue current medication regimen. Patients health , Ana Rosa Cason, will be following up as scheduled. I will continue to monitor regularly and will follow-up in 6 weeks, sooner if blood sugar begins to trend upward or downward.     Current Medication Regimen:  Diabetes Medications             glipiZIDE (GLUCOTROL) 5 MG tablet Take 2 tabs PO QAM and 1 tab Po QPM    metFORMIN (GLUCOPHAGE-XR) 500 MG 24 hr tablet Take 2 tablets (1,000 mg total) by mouth 2 (two) times daily with meals.    SITagliptin (JANUVIA) 100 MG Tab Take 1 tablet (100 mg total) by mouth once daily.         Patient has my contact information and knows to call with any concerns or clinical changes.

## 2019-03-12 ENCOUNTER — PATIENT OUTREACH (OUTPATIENT)
Dept: OTHER | Facility: OTHER | Age: 79
End: 2019-03-12

## 2019-03-12 NOTE — PROGRESS NOTES
Last 5 Patient Entered Readings                                      Current 30 Day Average: 135/72     Recent Readings 3/7/2019 3/7/2019 3/1/2019 3/1/2019 2/25/2019    SBP (mmHg) 136 136 141 141 132    DBP (mmHg) 72 72 73 73 67    Pulse 68 68 70 70 70          Last 6 Patient Entered Readings                                          Most Recent A1c: 9.7% on 12/3/2018  (Goal: 8%)     Recent Readings 3/8/2019 2/28/2019 2/25/2019 2/22/2019 2/21/2019    Blood Glucose (mg/dL) 130 135 134 148 121          Digital Medicine: Health  Follow Up    Lifestyle Modifications:    1.Dietary Modifications (Sodium intake <2,000mg/day, food labels, dining out): Patient has been working on his low sodium/carb diet. He stated that he is eating better so we will continue to work on this to help keep his BP/BS readings within goal.     2.Physical Activity: Deferred    3.Medication Therapy: Patient has been compliant with the medication regimen. Patient stated that his BP and BS readings are much better because he is remembering to take his medication at the correct time every day. He did inform me that he got his refill for the Glipidize in the mail the other day but he misplaced it (he thinks he may have thrown it away). I informed him to call his pharmacy and to see what they can do about replacing this medication. I advised him to call his PharmD, ALEJANDRO Jones, if he has issues affording the medication in the case that he has to purchase it out of pocket.     4.Patient has the following medication side effects/concerns:   (Frequency/Alleviating factors/Precipitating factors, etc.)     Patient is going to try to go have his A1C drawn this week.     Follow up with Mr. James Mae Jr. completed. No further questions or concerns. Will continue to follow up to achieve health goals.

## 2019-03-19 ENCOUNTER — PATIENT OUTREACH (OUTPATIENT)
Dept: OTHER | Facility: OTHER | Age: 79
End: 2019-03-19

## 2019-03-19 DIAGNOSIS — N18.30 TYPE 2 DIABETES MELLITUS WITH STAGE 3 CHRONIC KIDNEY DISEASE, WITHOUT LONG-TERM CURRENT USE OF INSULIN: Chronic | ICD-10-CM

## 2019-03-19 DIAGNOSIS — E11.22 TYPE 2 DIABETES MELLITUS WITH STAGE 3 CHRONIC KIDNEY DISEASE, WITHOUT LONG-TERM CURRENT USE OF INSULIN: Chronic | ICD-10-CM

## 2019-03-19 RX ORDER — GLIPIZIDE 5 MG/1
TABLET ORAL
Qty: 270 TABLET | Refills: 3 | Status: SHIPPED | OUTPATIENT
Start: 2019-03-19 | End: 2019-04-03

## 2019-03-19 NOTE — PROGRESS NOTES
Last 5 Patient Entered Readings                                      Current 30 Day Average: 141/74     Recent Readings 3/15/2019 3/15/2019 3/15/2019 3/15/2019 3/14/2019    SBP (mmHg) 153 153 153 153 146    DBP (mmHg) 74 74 76 76 76    Pulse 81 81 79 79 65          Last 6 Patient Entered Readings                                          Most Recent A1c: 9.7% on 12/3/2018  (Goal: 8%)     Recent Readings 3/18/2019 3/15/2019 3/14/2019 3/13/2019 3/8/2019    Blood Glucose (mg/dL) 178 202 124 205 130          Patient called in regards to a refill for his Glipizide and after speaking with his PharmD, ALEJANDRO Jones, I informed him that the script was sent to [facility] (he will pay out of pocket because he lost/misplaced his last refill that he got from his mail order pharmacy).

## 2019-03-20 ENCOUNTER — PATIENT OUTREACH (OUTPATIENT)
Dept: OTHER | Facility: OTHER | Age: 79
End: 2019-03-20

## 2019-03-20 NOTE — PROGRESS NOTES
"Last 5 Patient Entered Readings                                      Current 30 Day Average: 142/75     Recent Readings 3/20/2019 3/20/2019 3/20/2019 3/20/2019 3/15/2019    SBP (mmHg) 151 151 160 160 153    DBP (mmHg) 89 89 79 79 74    Pulse 65 65 71 71 81          Last 6 Patient Entered Readings                                          Most Recent A1c: 9.7% on 12/3/2018  (Goal: 8%)     Recent Readings 3/20/2019 3/18/2019 3/15/2019 3/14/2019 3/13/2019    Blood Glucose (mg/dL) 231 178 202 124 205          Patient called to see if he needs to fast before having his labs drawn. After speaking with his PharmD, ALEJANDRO Jones, I was able to inform him that he does not need to fast. I also informed him that his new prescription was sent into [facility].  He informed me that his wife had an "episode" yesterday and it really worried him and cause him a lot of stress and anxiety. He thinks this affect his BP etc. He denies any changes in his diet. He will keep a close eye on his carb and sodium intake over the next few days to make sure that is not affecting his readings as well.   "

## 2019-03-21 ENCOUNTER — LAB VISIT (OUTPATIENT)
Dept: LAB | Facility: HOSPITAL | Age: 79
End: 2019-03-21
Attending: INTERNAL MEDICINE
Payer: MEDICARE

## 2019-03-21 DIAGNOSIS — E11.22 TYPE 2 DIABETES MELLITUS WITH STAGE 3 CHRONIC KIDNEY DISEASE, WITHOUT LONG-TERM CURRENT USE OF INSULIN: Chronic | ICD-10-CM

## 2019-03-21 DIAGNOSIS — N18.30 TYPE 2 DIABETES MELLITUS WITH STAGE 3 CHRONIC KIDNEY DISEASE, WITHOUT LONG-TERM CURRENT USE OF INSULIN: Chronic | ICD-10-CM

## 2019-03-21 LAB
ESTIMATED AVG GLUCOSE: 186 MG/DL
HBA1C MFR BLD HPLC: 8.1 %

## 2019-03-21 PROCEDURE — 36415 COLL VENOUS BLD VENIPUNCTURE: CPT | Mod: HCNC,PO

## 2019-03-21 PROCEDURE — 83036 HEMOGLOBIN GLYCOSYLATED A1C: CPT | Mod: HCNC

## 2019-03-22 ENCOUNTER — PATIENT MESSAGE (OUTPATIENT)
Dept: FAMILY MEDICINE | Facility: CLINIC | Age: 79
End: 2019-03-22

## 2019-03-22 DIAGNOSIS — N18.30 TYPE 2 DIABETES MELLITUS WITH STAGE 3 CHRONIC KIDNEY DISEASE, WITHOUT LONG-TERM CURRENT USE OF INSULIN: Primary | Chronic | ICD-10-CM

## 2019-03-22 DIAGNOSIS — E11.22 TYPE 2 DIABETES MELLITUS WITH STAGE 3 CHRONIC KIDNEY DISEASE, WITHOUT LONG-TERM CURRENT USE OF INSULIN: Primary | Chronic | ICD-10-CM

## 2019-03-27 ENCOUNTER — PATIENT OUTREACH (OUTPATIENT)
Dept: OTHER | Facility: OTHER | Age: 79
End: 2019-03-27

## 2019-03-27 NOTE — PROGRESS NOTES
"Last 5 Patient Entered Readings                                      Current 30 Day Average: 139/74     Recent Readings 3/27/2019 3/27/2019 3/27/2019 3/27/2019 3/26/2019    SBP (mmHg) 127 127 144 144 112    DBP (mmHg) 68 68 75 75 67    Pulse 69 69 69 69 84          Last 6 Patient Entered Readings                                          Most Recent A1c: 8.1% on 3/21/2019  (Goal: 8%)     Recent Readings 3/27/2019 3/27/2019 3/26/2019 3/22/2019 3/20/2019    Blood Glucose (mg/dL) 86 34 142 119 231          Spoke with patient in regards to his low BS reading this morning (34). He stated that he did not get enough blood on the strip so I will delete that reading out of his record. His second reading did go back up but he is still unsure why it was not higher. Patient stated that he did not eat any "sweets" yesterday so that may be why. Patient denies symptoms with the lower reading (86). He will reach out if anything changes.   "

## 2019-04-03 ENCOUNTER — PATIENT OUTREACH (OUTPATIENT)
Dept: OTHER | Facility: OTHER | Age: 79
End: 2019-04-03

## 2019-04-03 DIAGNOSIS — N18.30 TYPE 2 DIABETES MELLITUS WITH STAGE 3 CHRONIC KIDNEY DISEASE, WITHOUT LONG-TERM CURRENT USE OF INSULIN: Chronic | ICD-10-CM

## 2019-04-03 DIAGNOSIS — E11.22 TYPE 2 DIABETES MELLITUS WITH STAGE 3 CHRONIC KIDNEY DISEASE, WITHOUT LONG-TERM CURRENT USE OF INSULIN: Chronic | ICD-10-CM

## 2019-04-03 DIAGNOSIS — I10 ESSENTIAL HYPERTENSION, BENIGN: ICD-10-CM

## 2019-04-03 RX ORDER — GLIPIZIDE 10 MG/1
10 TABLET ORAL 2 TIMES DAILY WITH MEALS
Qty: 180 TABLET | Refills: 3 | Status: SHIPPED | OUTPATIENT
Start: 2019-04-03 | End: 2020-05-18

## 2019-04-03 RX ORDER — LOSARTAN POTASSIUM 100 MG/1
100 TABLET ORAL DAILY
Qty: 90 TABLET | Refills: 3 | Status: SHIPPED | OUTPATIENT
Start: 2019-04-03 | End: 2020-05-18

## 2019-04-03 NOTE — PROGRESS NOTES
1. HTN  HPI:  Called patient to follow up. Patient reports adherence to medication regimen daily and denies missed doses. Patient denies hypotensive s/sx (lightheadedness, dizziness, nausea, fatigue); patient denies hypertensive s/sx (SOB, CP, severe headaches, changes in vision, dizziness, fatigue, confusion, anxiety, nosebleeds).    Last 5 Patient Entered Readings                                      Current 30 Day Average: 141/74     Recent Readings 4/3/2019 4/3/2019 4/3/2019 4/3/2019 3/28/2019    SBP (mmHg) 153 153 153 153 139    DBP (mmHg) 72 72 79 79 68    Pulse 63 63 61 61 78        Assessment:  Reviewed recent readings. Per 2017 ACC/ AHA HTN guidelines (goal of BP < 130/80), current 30-day average is slightly uncontrolled.     Plan:  Discussed with and instructed patient to increase losartan to 100mg, patient confirms understanding.  Patients health , Ana Rosa Cason, will be following up as scheduled.   I will continue to monitor regularly and will follow-up in 2 weeks, sooner if blood pressure begins to trend upward or downward.     Current medication regimen:  Hypertension Medications             losartan (COZAAR) 100 MG tablet Take 1 tablet (100 mg total) by mouth once daily.    metoprolol succinate (TOPROL-XL) 50 MG 24 hr tablet Take 1 tablet (50 mg total) by mouth once daily.    nitroGLYCERIN (NITROSTAT) 0.4 MG SL tablet Place 1 tablet (0.4 mg total) under the tongue every 5 (five) minutes as needed for Chest pain.        Patient denies having questions or concerns. Patient has my contact information and knows to call with any concerns or clinical changes.       2. DM  HPI:  Called patient to follow up regarding the DDMP (Diabetes Digital Medicine Program).  Patient reports adherence to medication regimen daily and denies missed doses.   Patient denies hypoglycemic s/sx (dizziness, extreme hunger, headaches, confusion, trouble concentrating, sweating, shaking, blurred vision, personality  changes); patient denies hyperglycemic s/sx (increased thirst, increased urination, headaches, trouble concentrating, blurred vision, fatigue).    Patient reports for the past 1-2 months he has been taking glipizide 10mg BID, will update med list.     Last 6 Patient Entered Readings                                          Most Recent A1c: 8.1% on 3/21/2019  (Goal: 8%)     Recent Readings 3/28/2019 3/27/2019 3/27/2019 3/26/2019 3/22/2019    Blood Glucose (mg/dL) 84 86 (No Data)  142 119        Assessment:  Reviewed recent readings. Per AACE/ACE guidelines (goal A1C < 7%), DM is slightly uncontrolled (A1C on 3/21 was 8.1%). Next A1C will be due in 5/2019.    Plan:  Continue current medication regimen. Patients health , Ana Rosa Cason, will be following up as scheduled. I will continue to monitor regularly and will follow-up in _ weeks, sooner if blood sugar begins to trend upward or downward.     Current Medication Regimen:  Diabetes Medications             glipiZIDE (GLUCOTROL) 10 MG tablet Take 1 tablet (10 mg total) by mouth 2 (two) times daily with meals.    metFORMIN (GLUCOPHAGE-XR) 500 MG 24 hr tablet Take 2 tablets (1,000 mg total) by mouth 2 (two) times daily with meals.    SITagliptin (JANUVIA) 100 MG Tab Take 1 tablet (100 mg total) by mouth once daily.        Patient has my contact information and knows to call with any concerns or clinical changes.

## 2019-04-16 ENCOUNTER — PATIENT OUTREACH (OUTPATIENT)
Dept: OTHER | Facility: OTHER | Age: 79
End: 2019-04-16

## 2019-04-16 NOTE — PROGRESS NOTES
1. HTN  HPI:  Called patient to follow up since increasing losartan to 100mg, patient states he started increased dose about 2-3 days ago, confirms he is tolerating well. Patient reports adherence to medication regimen daily and denies missed doses. Patient denies hypotensive s/sx (lightheadedness, dizziness, nausea, fatigue); patient denies hypertensive s/sx (SOB, CP, severe headaches, changes in vision, dizziness, fatigue, confusion, anxiety, nosebleeds).     Patient admits to worsening SOB.     Last 5 Patient Entered Readings                                      Current 30 Day Average: 139/74     Recent Readings 4/16/2019 4/15/2019 4/15/2019 4/14/2019 4/14/2019    SBP (mmHg) 128 138 147 147 144    DBP (mmHg) 73 66 69 69 79    Pulse 77 72 73 77 77        Assessment:  Reviewed recent readings. Per 2017 ACC/ AHA HTN guidelines (goal of BP < 130/80), current 30-day average is slightly uncontrolled.     Plan:  Continue current medication regimen.   Encouraged patient to reach out to Pulmonologist.   Patients health , Ana Rosa Cason, will be following up as scheduled.   I will continue to monitor regularly and will follow-up in 2 weeks, sooner if blood pressure begins to trend upward or downward.     Current medication regimen:  Hypertension Medications             losartan (COZAAR) 100 MG tablet Take 1 tablet (100 mg total) by mouth once daily.    metoprolol succinate (TOPROL-XL) 50 MG 24 hr tablet Take 1 tablet (50 mg total) by mouth once daily.    nitroGLYCERIN (NITROSTAT) 0.4 MG SL tablet Place 1 tablet (0.4 mg total) under the tongue every 5 (five) minutes as needed for Chest pain.         Patient denies having questions or concerns. Patient has my contact information and knows to call with any concerns or clinical changes.       2. DM  HPI:  Called patient to follow up regarding the DDMP (Diabetes Digital Medicine Program).  Patient reports adherence to medication regimen daily and denies missed doses.    Patient denies hypoglycemic s/sx (dizziness, extreme hunger, headaches, confusion, trouble concentrating, sweating, shaking, blurred vision, personality changes); patient denies hyperglycemic s/sx (increased thirst, increased urination, headaches, trouble concentrating, blurred vision, fatigue).    Last 6 Patient Entered Readings                                          Most Recent A1c: 8.1% on 3/21/2019  (Goal: 8%)     Recent Readings 4/16/2019 4/15/2019 4/14/2019 4/11/2019 4/9/2019    Blood Glucose (mg/dL) 115 272 156 143 214        Assessment:  Reviewed recent readings. Per AACE/ACE guidelines (goal A1C < 7%), DM is slightly uncontrolled (A1C on 3/21 was 8.1%). Next A1C will be due in 6/2019.    Plan:  Continue current medication regimen. Patients health , Ana Rosa Cason, will be following up as scheduled. I will continue to monitor regularly and will follow-up in 2 weeks, sooner if blood sugar begins to trend upward or downward.     Current Medication Regimen:  Diabetes Medications             glipiZIDE (GLUCOTROL) 10 MG tablet Take 1 tablet (10 mg total) by mouth 2 (two) times daily with meals.    metFORMIN (GLUCOPHAGE-XR) 500 MG 24 hr tablet Take 2 tablets (1,000 mg total) by mouth 2 (two) times daily with meals.    SITagliptin (JANUVIA) 100 MG Tab Take 1 tablet (100 mg total) by mouth once daily.         Patient has my contact information and knows to call with any concerns or clinical changes.

## 2019-04-29 ENCOUNTER — PATIENT OUTREACH (OUTPATIENT)
Dept: OTHER | Facility: OTHER | Age: 79
End: 2019-04-29

## 2019-04-29 NOTE — PROGRESS NOTES
Last 5 Patient Entered Readings                                      Current 30 Day Average: 140/74     Recent Readings 4/29/2019 4/26/2019 4/26/2019 4/26/2019 4/25/2019    SBP (mmHg) 137 117 169 179 150    DBP (mmHg) 66 66 79 82 70    Pulse 79 82 72 72 66          Last 6 Patient Entered Readings                                          Most Recent A1c: 8.1% on 3/21/2019  (Goal: 8%)     Recent Readings 4/29/2019 4/26/2019 4/26/2019 4/25/2019 4/24/2019    Blood Glucose (mg/dL) 88 136 217 105 122          Digital Medicine: Health  Follow Up    Lifestyle Modifications:    1.Dietary Modifications (Sodium intake <2,000mg/day, food labels, dining out): Patients daughter and son in law recently moved in to help care for the patients wife. He is trying to work with them so that he gets back on track with his diet and with what they are buying for the house. I offered to speak with the patients daughter about this so he stated that he will talk to her about it and have her call me with questions.     2.Physical Activity: Deferred    3.Medication Therapy: Patient has been compliant with the medication regimen. Patient is doing well on his current BP/Diabetes medication regimen. He is working to stay on schedule with the time he takes his medication. He has been doing very well with not missing any of his medications. I suggested he use a pill container to help keep him on track better but he stated that he has a process in place that is working for him.     4.Patient has the following medication side effects/concerns:   (Frequency/Alleviating factors/Precipitating factors, etc.)     Patient continues to have a lot of stress when is comes to caring for his wife who is at home on hospice. He is receiving help from his children but it is still a lot to handle. I will continue to provide support.     Follow up with Mr. James Mae Jr. completed. No further questions or concerns. Will continue to follow up to achieve  health goals.

## 2019-04-30 ENCOUNTER — PATIENT OUTREACH (OUTPATIENT)
Dept: OTHER | Facility: OTHER | Age: 79
End: 2019-04-30

## 2019-04-30 NOTE — PROGRESS NOTES
1. HTN  HPI:  Called patient to follow up. Patient reports adherence to medication regimen daily and denies missed doses. Patient denies hypotensive s/sx (lightheadedness, dizziness, nausea, fatigue); patient denies hypertensive s/sx (SOB, CP, severe headaches, changes in vision, dizziness, fatigue, confusion, anxiety, nosebleeds).     Patient admits to worsening SOB.     Last 5 Patient Entered Readings                                      Current 30 Day Average: 138/74     Recent Readings 4/30/2019 4/30/2019 4/29/2019 4/26/2019 4/26/2019    SBP (mmHg) 115 121 137 117 169    DBP (mmHg) 67 73 66 66 79    Pulse 76 75 79 82 72        Assessment:  Reviewed recent readings. Per 2017 ACC/ AHA HTN guidelines (goal of BP < 130/80), current 30-day average is slightly uncontrolled. Patient is scheduled to follow up with IM on 6/25.    Plan:  Continue current medication regimen.   Encouraged patient to reach out to Pulmonologist.  Patients health , Ana Rosa Cason, will be following up as scheduled.   I will continue to monitor regularly and will follow-up in 8 weeks, sooner if blood pressure begins to trend upward or downward.     Current medication regimen:  Hypertension Medications             losartan (COZAAR) 100 MG tablet Take 1 tablet (100 mg total) by mouth once daily.    metoprolol succinate (TOPROL-XL) 50 MG 24 hr tablet Take 1 tablet (50 mg total) by mouth once daily.    nitroGLYCERIN (NITROSTAT) 0.4 MG SL tablet Place 1 tablet (0.4 mg total) under the tongue every 5 (five) minutes as needed for Chest pain.         Patient denies having questions or concerns. Patient has my contact information and knows to call with any concerns or clinical changes.       2. DM  HPI:  Called patient to follow up regarding the DDMP (Diabetes Digital Medicine Program).  Patient reports adherence to medication regimen daily and denies missed doses.   Patient denies hypoglycemic s/sx (dizziness, extreme hunger, headaches,  confusion, trouble concentrating, sweating, shaking, blurred vision, personality changes); patient denies hyperglycemic s/sx (increased thirst, increased urination, headaches, trouble concentrating, blurred vision, fatigue).    Last 6 Patient Entered Readings                                          Most Recent A1c: 8.1% on 3/21/2019  (Goal: 8%)     Recent Readings 4/30/2019 4/29/2019 4/26/2019 4/26/2019 4/25/2019    Blood Glucose (mg/dL) 87 88 136 217 105        Assessment:  Reviewed recent readings. Per AACE/ACE guidelines (goal A1C < 7%), DM is slightly uncontrolled (A1C on 3/21 was 8.1%). Next A1C will be due in 6/2019.    Plan:  Continue current medication regimen. Patients health , Ana Rosa Cason, will be following up as scheduled. I will continue to monitor regularly and will follow-up in 8 weeks, sooner if blood sugar begins to trend upward or downward.     Current Medication Regimen:  Diabetes Medications             glipiZIDE (GLUCOTROL) 10 MG tablet Take 1 tablet (10 mg total) by mouth 2 (two) times daily with meals.    metFORMIN (GLUCOPHAGE-XR) 500 MG 24 hr tablet Take 2 tablets (1,000 mg total) by mouth 2 (two) times daily with meals.    SITagliptin (JANUVIA) 100 MG Tab Take 1 tablet (100 mg total) by mouth once daily.         Patient has my contact information and knows to call with any concerns or clinical changes.

## 2019-05-21 ENCOUNTER — PATIENT OUTREACH (OUTPATIENT)
Dept: OTHER | Facility: OTHER | Age: 79
End: 2019-05-21

## 2019-05-21 NOTE — PROGRESS NOTES
Last 5 Patient Entered Readings                                      Current 30 Day Average: 132/72     Recent Readings 5/20/2019 5/16/2019 5/16/2019 5/14/2019 5/14/2019    SBP (mmHg) 117 132 133 131 128    DBP (mmHg) 68 68 71 67 72    Pulse 84 65 65 68 72          Last 6 Patient Entered Readings                                          Most Recent A1c: 8.1% on 3/21/2019  (Goal: 8%)     Recent Readings 5/20/2019 5/16/2019 5/14/2019 5/12/2019 5/11/2019    Blood Glucose (mg/dL) 126 101 119 180 132            Digital Medicine: Health  Follow Up    Lifestyle Modifications:    1.Dietary Modifications (Sodium intake <2,000mg/day, food labels, dining out): Patient stated that he is still doing the cooking at the home even though his daughter and son in law are living with him now (to help care for his wife). He stated that he is not really focusing on low sodium/carb meals because he is not use to cooking that. He did ask if I could send some example meal plans to help him with that so I will put the information in the mail for him (per his request).     2.Physical Activity: Deferred    3.Medication Therapy: Patient has been compliant with the medication regimen. Patient is doing well on his current BP/Diabetes medication regimen. He denies symptoms/side effects.     4.Patient has the following medication side effects/concerns: None  (Frequency/Alleviating factors/Precipitating factors, etc.)     Patient has been suffering from diarrhea for the past month and he also wants to get the asbestos in his lung checked out again. He is going to follow up with his doctor soon about this.     Follow up with Mr. James Mae Jr. completed. No further questions or concerns. Will continue to follow up to achieve health goals.

## 2019-06-24 ENCOUNTER — PATIENT OUTREACH (OUTPATIENT)
Dept: OTHER | Facility: OTHER | Age: 79
End: 2019-06-24

## 2019-06-24 NOTE — PROGRESS NOTES
1. HTN  HPI:  Called patient to follow up. Patient admits to missing medication 2/7 days per week. Patient denies hypotensive s/sx (lightheadedness, dizziness, nausea, fatigue); patient denies hypertensive s/sx (SOB, CP, severe headaches, changes in vision, dizziness, fatigue, confusion, anxiety, nosebleeds).     Patient reports ongoing diarrhea for the past 2 months. Patient states his car is currently in the shop, waiting to get it back prior to making an appt.    Last 5 Patient Entered Readings                                      Current 30 Day Average: 129/72     Recent Readings 6/22/2019 6/22/2019 6/22/2019 6/20/2019 6/20/2019    SBP (mmHg) 129 144 144 151 153    DBP (mmHg) 76 74 78 71 77    Pulse 72 67 67 65 67        Assessment:  Reviewed recent readings. Per 2017 ACC/ AHA HTN guidelines (goal of BP < 130/80), current 30-day average is controlled.      Plan:  Continue current medication regimen.   Encouraged patient to make PCP appt to discuss ongoing diarrhea.   Encouraged patient to charge digital cuff at least monthly.  Patients health , Ana Rosa Cason, will be following up as scheduled.   I will continue to monitor regularly and will follow-up in 12 weeks, sooner if blood pressure begins to trend upward or downward.     Current medication regimen:  Hypertension Medications             losartan (COZAAR) 100 MG tablet Take 1 tablet (100 mg total) by mouth once daily.    metoprolol succinate (TOPROL-XL) 50 MG 24 hr tablet Take 1 tablet (50 mg total) by mouth once daily.    nitroGLYCERIN (NITROSTAT) 0.4 MG SL tablet Place 1 tablet (0.4 mg total) under the tongue every 5 (five) minutes as needed for Chest pain.         Patient denies having questions or concerns. Patient has my contact information and knows to call with any concerns or clinical changes. Instructed patient to call if BP remains > 130/80.       2. DM  HPI:  Called patient to follow up regarding the DDMP (Diabetes Digital Medicine  Program).  Patient admits to missing medication 2/7 days per week.  Patient denies hypoglycemic s/sx (dizziness, extreme hunger, headaches, confusion, trouble concentrating, sweating, shaking, blurred vision, personality changes); patient denies hyperglycemic s/sx (increased thirst, increased urination, headaches, trouble concentrating, blurred vision, fatigue).    Last 6 Patient Entered Readings                                          Most Recent A1c: 8.1% on 3/21/2019  (Goal: 8%)     Recent Readings 6/22/2019 6/20/2019 6/15/2019 6/12/2019 6/11/2019    Blood Glucose (mg/dL) 112 156 113 152 100        Assessment:  Reviewed recent readings. Per AACE/ACE guidelines (goal A1C < 7%), DM is slightly uncontrolled (A1C on 3/21 was 8.1%). Next A1C is due.    Plan:  Continue current medication regimen. Patients health , Ana Rosa Cason, will be following up as scheduled. I will continue to monitor regularly and will follow-up in 12 weeks, sooner if blood sugar begins to trend upward or downward.     Current Medication Regimen:  Diabetes Medications             glipiZIDE (GLUCOTROL) 10 MG tablet Take 1 tablet (10 mg total) by mouth 2 (two) times daily with meals.    metFORMIN (GLUCOPHAGE-XR) 500 MG 24 hr tablet Take 2 tablets (1,000 mg total) by mouth 2 (two) times daily with meals.    SITagliptin (JANUVIA) 100 MG Tab Take 1 tablet (100 mg total) by mouth once daily.         Patient has my contact information and knows to call with any concerns or clinical changes.

## 2019-07-18 DIAGNOSIS — I25.10 CORONARY ARTERY DISEASE INVOLVING NATIVE CORONARY ARTERY WITHOUT ANGINA PECTORIS, UNSPECIFIED WHETHER NATIVE OR TRANSPLANTED HEART: Chronic | ICD-10-CM

## 2019-07-18 DIAGNOSIS — E11.22 TYPE 2 DIABETES MELLITUS WITH STAGE 3 CHRONIC KIDNEY DISEASE, WITHOUT LONG-TERM CURRENT USE OF INSULIN: Chronic | ICD-10-CM

## 2019-07-18 DIAGNOSIS — N18.30 TYPE 2 DIABETES MELLITUS WITH STAGE 3 CHRONIC KIDNEY DISEASE, WITHOUT LONG-TERM CURRENT USE OF INSULIN: Chronic | ICD-10-CM

## 2019-07-18 DIAGNOSIS — E78.2 MIXED HYPERLIPIDEMIA: Chronic | ICD-10-CM

## 2019-07-18 DIAGNOSIS — M1A.9XX0 CHRONIC GOUT WITHOUT TOPHUS, UNSPECIFIED CAUSE, UNSPECIFIED SITE: ICD-10-CM

## 2019-07-18 RX ORDER — METOPROLOL SUCCINATE 50 MG/1
TABLET, EXTENDED RELEASE ORAL
Qty: 90 TABLET | Refills: 0 | Status: SHIPPED | OUTPATIENT
Start: 2019-07-18 | End: 2019-11-04 | Stop reason: SDUPTHER

## 2019-07-18 RX ORDER — METFORMIN HYDROCHLORIDE 500 MG/1
TABLET, EXTENDED RELEASE ORAL
Qty: 360 TABLET | Refills: 0 | Status: SHIPPED | OUTPATIENT
Start: 2019-07-18 | End: 2019-11-04 | Stop reason: SDUPTHER

## 2019-07-18 RX ORDER — ATORVASTATIN CALCIUM 40 MG/1
TABLET, FILM COATED ORAL
Qty: 90 TABLET | Refills: 0 | Status: SHIPPED | OUTPATIENT
Start: 2019-07-18 | End: 2019-11-04 | Stop reason: SDUPTHER

## 2019-07-18 RX ORDER — ALLOPURINOL 300 MG/1
TABLET ORAL
Qty: 90 TABLET | Refills: 0 | Status: SHIPPED | OUTPATIENT
Start: 2019-07-18 | End: 2019-11-04 | Stop reason: SDUPTHER

## 2019-07-18 NOTE — TELEPHONE ENCOUNTER
Refill approved.  Due for diabetes follow up with labs prior.  Please sched with Corrine    Thank you,  Erik

## 2019-07-22 ENCOUNTER — LAB VISIT (OUTPATIENT)
Dept: LAB | Facility: HOSPITAL | Age: 79
End: 2019-07-22
Attending: INTERNAL MEDICINE
Payer: MEDICARE

## 2019-07-22 DIAGNOSIS — E78.2 MIXED HYPERLIPIDEMIA: Chronic | ICD-10-CM

## 2019-07-22 DIAGNOSIS — M1A.9XX0 CHRONIC GOUT WITHOUT TOPHUS, UNSPECIFIED CAUSE, UNSPECIFIED SITE: ICD-10-CM

## 2019-07-22 DIAGNOSIS — E11.22 TYPE 2 DIABETES MELLITUS WITH STAGE 3 CHRONIC KIDNEY DISEASE, WITHOUT LONG-TERM CURRENT USE OF INSULIN: Chronic | ICD-10-CM

## 2019-07-22 DIAGNOSIS — N18.30 TYPE 2 DIABETES MELLITUS WITH STAGE 3 CHRONIC KIDNEY DISEASE, WITHOUT LONG-TERM CURRENT USE OF INSULIN: Chronic | ICD-10-CM

## 2019-07-22 DIAGNOSIS — I25.10 CORONARY ARTERY DISEASE INVOLVING NATIVE CORONARY ARTERY WITHOUT ANGINA PECTORIS, UNSPECIFIED WHETHER NATIVE OR TRANSPLANTED HEART: Chronic | ICD-10-CM

## 2019-07-22 LAB
ANION GAP SERPL CALC-SCNC: 7 MMOL/L (ref 8–16)
BASOPHILS # BLD AUTO: 0.06 K/UL (ref 0–0.2)
BASOPHILS NFR BLD: 0.8 % (ref 0–1.9)
BUN SERPL-MCNC: 21 MG/DL (ref 8–23)
CALCIUM SERPL-MCNC: 9.4 MG/DL (ref 8.7–10.5)
CHLORIDE SERPL-SCNC: 109 MMOL/L (ref 95–110)
CO2 SERPL-SCNC: 24 MMOL/L (ref 23–29)
CREAT SERPL-MCNC: 1.2 MG/DL (ref 0.5–1.4)
DIFFERENTIAL METHOD: ABNORMAL
EOSINOPHIL # BLD AUTO: 0.4 K/UL (ref 0–0.5)
EOSINOPHIL NFR BLD: 5.7 % (ref 0–8)
ERYTHROCYTE [DISTWIDTH] IN BLOOD BY AUTOMATED COUNT: 14.5 % (ref 11.5–14.5)
EST. GFR  (AFRICAN AMERICAN): >60 ML/MIN/1.73 M^2
EST. GFR  (NON AFRICAN AMERICAN): 58 ML/MIN/1.73 M^2
ESTIMATED AVG GLUCOSE: 157 MG/DL (ref 68–131)
GLUCOSE SERPL-MCNC: 82 MG/DL (ref 70–110)
HBA1C MFR BLD HPLC: 7.1 % (ref 4–5.6)
HCT VFR BLD AUTO: 38.1 % (ref 40–54)
HGB BLD-MCNC: 12 G/DL (ref 14–18)
IMM GRANULOCYTES # BLD AUTO: 0.02 K/UL (ref 0–0.04)
IMM GRANULOCYTES NFR BLD AUTO: 0.3 % (ref 0–0.5)
LYMPHOCYTES # BLD AUTO: 2.1 K/UL (ref 1–4.8)
LYMPHOCYTES NFR BLD: 27.6 % (ref 18–48)
MCH RBC QN AUTO: 30.8 PG (ref 27–31)
MCHC RBC AUTO-ENTMCNC: 31.5 G/DL (ref 32–36)
MCV RBC AUTO: 98 FL (ref 82–98)
MONOCYTES # BLD AUTO: 0.8 K/UL (ref 0.3–1)
MONOCYTES NFR BLD: 10.8 % (ref 4–15)
NEUTROPHILS # BLD AUTO: 4.2 K/UL (ref 1.8–7.7)
NEUTROPHILS NFR BLD: 54.8 % (ref 38–73)
NRBC BLD-RTO: 0 /100 WBC
PLATELET # BLD AUTO: 234 K/UL (ref 150–350)
PMV BLD AUTO: 10.1 FL (ref 9.2–12.9)
POTASSIUM SERPL-SCNC: 4.4 MMOL/L (ref 3.5–5.1)
RBC # BLD AUTO: 3.9 M/UL (ref 4.6–6.2)
SODIUM SERPL-SCNC: 140 MMOL/L (ref 136–145)
WBC # BLD AUTO: 7.57 K/UL (ref 3.9–12.7)

## 2019-07-22 PROCEDURE — 83036 HEMOGLOBIN GLYCOSYLATED A1C: CPT | Mod: HCNC

## 2019-07-22 PROCEDURE — 36415 COLL VENOUS BLD VENIPUNCTURE: CPT | Mod: HCNC,PO

## 2019-07-22 PROCEDURE — 85025 COMPLETE CBC W/AUTO DIFF WBC: CPT | Mod: HCNC

## 2019-07-22 PROCEDURE — 80048 BASIC METABOLIC PNL TOTAL CA: CPT | Mod: HCNC

## 2019-07-26 ENCOUNTER — OFFICE VISIT (OUTPATIENT)
Dept: FAMILY MEDICINE | Facility: CLINIC | Age: 79
End: 2019-07-26
Payer: MEDICARE

## 2019-07-26 VITALS
OXYGEN SATURATION: 95 % | HEIGHT: 65 IN | HEART RATE: 76 BPM | TEMPERATURE: 98 F | SYSTOLIC BLOOD PRESSURE: 118 MMHG | BODY MASS INDEX: 33.61 KG/M2 | DIASTOLIC BLOOD PRESSURE: 80 MMHG | WEIGHT: 201.75 LBS

## 2019-07-26 DIAGNOSIS — E11.22 TYPE 2 DIABETES MELLITUS WITH STAGE 3 CHRONIC KIDNEY DISEASE, WITHOUT LONG-TERM CURRENT USE OF INSULIN: Primary | Chronic | ICD-10-CM

## 2019-07-26 DIAGNOSIS — R49.0 HOARSENESS OF VOICE: ICD-10-CM

## 2019-07-26 DIAGNOSIS — L57.0 ACTINIC KERATOSES: ICD-10-CM

## 2019-07-26 DIAGNOSIS — I10 ESSENTIAL HYPERTENSION, BENIGN: Chronic | ICD-10-CM

## 2019-07-26 DIAGNOSIS — N18.30 TYPE 2 DIABETES MELLITUS WITH STAGE 3 CHRONIC KIDNEY DISEASE, WITHOUT LONG-TERM CURRENT USE OF INSULIN: Primary | Chronic | ICD-10-CM

## 2019-07-26 DIAGNOSIS — J61 PULMONARY ASBESTOSIS: Chronic | ICD-10-CM

## 2019-07-26 DIAGNOSIS — I25.10 CORONARY ARTERY DISEASE INVOLVING NATIVE CORONARY ARTERY WITHOUT ANGINA PECTORIS, UNSPECIFIED WHETHER NATIVE OR TRANSPLANTED HEART: Chronic | ICD-10-CM

## 2019-07-26 DIAGNOSIS — E78.2 MIXED HYPERLIPIDEMIA: Chronic | ICD-10-CM

## 2019-07-26 DIAGNOSIS — I70.0 ATHEROSCLEROSIS OF AORTA: Chronic | ICD-10-CM

## 2019-07-26 DIAGNOSIS — K52.9 CHRONIC DIARRHEA: ICD-10-CM

## 2019-07-26 DIAGNOSIS — M47.816 SPONDYLOSIS OF LUMBAR REGION WITHOUT MYELOPATHY OR RADICULOPATHY: Chronic | ICD-10-CM

## 2019-07-26 DIAGNOSIS — J44.9 CHRONIC OBSTRUCTIVE PULMONARY DISEASE, UNSPECIFIED COPD TYPE: Chronic | ICD-10-CM

## 2019-07-26 DIAGNOSIS — K57.90 DIVERTICULOSIS OF INTESTINE WITHOUT BLEEDING, UNSPECIFIED INTESTINAL TRACT LOCATION: Chronic | ICD-10-CM

## 2019-07-26 DIAGNOSIS — E66.9 OBESITY, CLASS I, BMI 30-34.9: Chronic | ICD-10-CM

## 2019-07-26 DIAGNOSIS — Z23 NEED FOR SHINGLES VACCINE: ICD-10-CM

## 2019-07-26 DIAGNOSIS — J84.10 CALCIFIED GRANULOMA OF LUNG: ICD-10-CM

## 2019-07-26 DIAGNOSIS — Z23 NEED FOR TD VACCINE: ICD-10-CM

## 2019-07-26 PROBLEM — J98.4 CALCIFIED GRANULOMA OF LUNG: Status: ACTIVE | Noted: 2019-07-26

## 2019-07-26 PROCEDURE — 99999 PR PBB SHADOW E&M-EST. PATIENT-LVL V: CPT | Mod: PBBFAC,HCNC,, | Performed by: NURSE PRACTITIONER

## 2019-07-26 PROCEDURE — 3079F DIAST BP 80-89 MM HG: CPT | Mod: HCNC,CPTII,S$GLB, | Performed by: NURSE PRACTITIONER

## 2019-07-26 PROCEDURE — 90714 TD VACC NO PRESV 7 YRS+ IM: CPT | Mod: HCNC,S$GLB,, | Performed by: NURSE PRACTITIONER

## 2019-07-26 PROCEDURE — 3074F SYST BP LT 130 MM HG: CPT | Mod: HCNC,CPTII,S$GLB, | Performed by: NURSE PRACTITIONER

## 2019-07-26 PROCEDURE — 99499 RISK ADDL DX/OHS AUDIT: ICD-10-PCS | Mod: HCNC,S$GLB,, | Performed by: NURSE PRACTITIONER

## 2019-07-26 PROCEDURE — 90714 TD VACCINE GREATER THAN OR EQUAL TO 7YO PRESERVATIVE FREE IM: ICD-10-PCS | Mod: HCNC,S$GLB,, | Performed by: NURSE PRACTITIONER

## 2019-07-26 PROCEDURE — 99999 PR PBB SHADOW E&M-EST. PATIENT-LVL V: ICD-10-PCS | Mod: PBBFAC,HCNC,, | Performed by: NURSE PRACTITIONER

## 2019-07-26 PROCEDURE — 99499 UNLISTED E&M SERVICE: CPT | Mod: HCNC,S$GLB,, | Performed by: NURSE PRACTITIONER

## 2019-07-26 PROCEDURE — 1101F PR PT FALLS ASSESS DOC 0-1 FALLS W/OUT INJ PAST YR: ICD-10-PCS | Mod: HCNC,CPTII,S$GLB, | Performed by: NURSE PRACTITIONER

## 2019-07-26 PROCEDURE — 3079F PR MOST RECENT DIASTOLIC BLOOD PRESSURE 80-89 MM HG: ICD-10-PCS | Mod: HCNC,CPTII,S$GLB, | Performed by: NURSE PRACTITIONER

## 2019-07-26 PROCEDURE — 90471 TD VACCINE GREATER THAN OR EQUAL TO 7YO PRESERVATIVE FREE IM: ICD-10-PCS | Mod: HCNC,S$GLB,, | Performed by: NURSE PRACTITIONER

## 2019-07-26 PROCEDURE — 99214 PR OFFICE/OUTPT VISIT, EST, LEVL IV, 30-39 MIN: ICD-10-PCS | Mod: 25,HCNC,S$GLB, | Performed by: NURSE PRACTITIONER

## 2019-07-26 PROCEDURE — 99214 OFFICE O/P EST MOD 30 MIN: CPT | Mod: 25,HCNC,S$GLB, | Performed by: NURSE PRACTITIONER

## 2019-07-26 PROCEDURE — 1101F PT FALLS ASSESS-DOCD LE1/YR: CPT | Mod: HCNC,CPTII,S$GLB, | Performed by: NURSE PRACTITIONER

## 2019-07-26 PROCEDURE — 3074F PR MOST RECENT SYSTOLIC BLOOD PRESSURE < 130 MM HG: ICD-10-PCS | Mod: HCNC,CPTII,S$GLB, | Performed by: NURSE PRACTITIONER

## 2019-07-26 PROCEDURE — 90471 IMMUNIZATION ADMIN: CPT | Mod: HCNC,S$GLB,, | Performed by: NURSE PRACTITIONER

## 2019-07-26 NOTE — PROGRESS NOTES
History of Present Illness   James Mae Jr. is a 79 y.o. man with medical history as listed below who presents today for routine 6 month diabetes follow-up. He had labs prior to our visit which showed reduction in hemoglobin A1c from 8.1 to 7.1 with stable BMP and CBC. He is taking his medications as prescribed with no complications. He does have a few complaints to address today. He reports that he has had diarrhea over the last two months. He reports after meals, he develops abdominal cramping with fecal urgency. He will then have one to two loose to watery bowel movements. He has not noticed further episodes of melena, BRBPR, or black tarry stool since his last visit. He denies persistent abdominal pain, nausea, vomiting, or fevers. He admits he been eating more fatty, fried, and greasy foods recently. He is also concerned because his voice has been hoarse and rough sounding. He states that years ago he had a vocal cord nodule removed for similar symptoms and is requesting a referral. He continues to care for his wife, tolerating well with no evidence of caregiver role strain today. He has no additional complaints and is otherwise healthy on today's visit.    Past Medical History:   Diagnosis Date    Asbestosis     COPD with asthma     Coronary artery disease     Diabetes mellitus type II     Hypertension        Past Surgical History:   Procedure Laterality Date    APPENDECTOMY      8 yrs ago    CHOLECYSTECTOMY      8 yrs ago    COLONOSCOPY Left 1/24/2013    Performed by Tej Rangel MD at [facility]    CORONARY STENT PLACEMENT      SMALL INTESTINE SURGERY      8 yrs       Social History     Socioeconomic History    Marital status:      Spouse name: Not on file    Number of children: Not on file    Years of education: Not on file    Highest education level: Not on file   Occupational History    Not on file   Social Needs    Financial resource strain: Not on file    Food  "insecurity:     Worry: Not on file     Inability: Not on file    Transportation needs:     Medical: Not on file     Non-medical: Not on file   Tobacco Use    Smoking status: Never Smoker    Smokeless tobacco: Never Used   Substance and Sexual Activity    Alcohol use: No    Drug use: No    Sexual activity: Yes     Partners: Female   Lifestyle    Physical activity:     Days per week: Not on file     Minutes per session: Not on file    Stress: Not on file   Relationships    Social connections:     Talks on phone: Not on file     Gets together: Not on file     Attends Rastafari service: Not on file     Active member of club or organization: Not on file     Attends meetings of clubs or organizations: Not on file     Relationship status: Not on file   Other Topics Concern    Not on file   Social History Narrative    Not on file       Family History   Problem Relation Age of Onset    Cancer Mother         throat    Heart disease Father         heart attack       Review of Systems  Review of Systems   Constitutional: Negative for chills, fever, malaise/fatigue and weight loss.   Respiratory: Negative for shortness of breath.    Cardiovascular: Negative for chest pain, palpitations, orthopnea and leg swelling.   Gastrointestinal: Positive for diarrhea. Negative for abdominal pain, blood in stool, constipation, melena, nausea and vomiting.   Genitourinary: Negative for hematuria.     A complete review of systems was otherwise negative.    Physical Exam  /80   Pulse 76   Temp 98.2 °F (36.8 °C) (Oral)   Ht 5' 5" (1.651 m)   Wt 91.5 kg (201 lb 11.5 oz)   SpO2 95%   BMI 33.57 kg/m²   General appearance: alert, appears stated age, cooperative and no distress  Throat: lips, mucosa, and tongue normal; teeth and gums normal  Back: symmetric, no curvature. ROM normal. No CVA tenderness.  Lungs: clear to auscultation bilaterally  Heart: regular rate and rhythm, S1, S2 normal, no murmur, click, rub or " gallop  Abdomen: soft, non-tender; bowel sounds normal; no masses,  no organomegaly and no rebound tenderness, guarding, or rigidity  Extremities: extremities normal, atraumatic, no cyanosis or edema  Pulses: 2+ and symmetric  Skin: Skin color, texture, turgor normal. No rashes or lesions. Multiple actinic keratoses noted.  Neurologic: Grossly normal     Protective Sensation (w/ 10 gram monofilament):  Right: Intact  Left: Intact    Visual Inspection:  Normal -  Bilateral    Pedal Pulses:   Right: Present  Left: Present    Posterior tibialis:   Right:Present  Left: Present      Assessment/Plan  Type 2 diabetes mellitus with stage 3 chronic kidney disease, without long-term current use of insulin  The current medical regimen is effective;  continue present plan and medications.  Improvement in glycemic control with reduction in hemoglobin A1c despite his lack of dietary control recently. Tighten dietary control and repeat labs in three months.  Digital diabetes.  -     Hemoglobin A1c; Future; Expected date: 10/26/2019  -     Lipid panel; Future; Expected date: 10/26/2019  -     Basic metabolic panel; Future; Expected date: 10/26/2019    Diverticulosis of intestine without bleeding, unspecified intestinal tract location  The current medical regimen is effective;  continue present plan and medications.  Stable.    Chronic diarrhea  We discussed potential for dumping syndrome being the cause of his symptoms and discussed dietary modifications.  We will collect stool cultures, and I have advised him to send in the fecal occult blood test as well.   -     Stool culture; Future; Expected date: 07/26/2019  -     Stool Exam-Ova,Cysts,Parasites; Future; Expected date: 07/26/2019    Obesity, Class I, BMI 30-34.9  The patient is asked to make an attempt to improve diet and exercise patterns to aid in medical management of this problem.    Essential hypertension, benign  The current medical regimen is effective;  continue  present plan and medications.  At goal today.    Coronary artery disease involving native coronary artery without angina pectoris, unspecified whether native or transplanted heart  The current medical regimen is effective;  continue present plan and medications.    Atherosclerosis of aorta  Stable.    Mixed hyperlipidemia  The current medical regimen is effective;  continue present plan and medications.  Fasting labs in three months with diabetes labs.  -     Lipid panel; Future; Expected date: 10/26/2019    Pulmonary asbestosis  The current medical regimen is effective;  continue present plan and medications.    Chronic obstructive pulmonary disease, unspecified COPD type  The current medical regimen is effective;  continue present plan and medications.    Spondylosis of lumbar region without myelopathy or radiculopathy  The current medical regimen is effective;  continue present plan and medications.    Need for shingles vaccine  Printed prescription provided.  -     varicella-zoster [id number], PF, (SHINGRIX, PF,) 50 mcg/0.5 mL injection; Inject 0.5 mLs into the muscle once. for 1 dose  Dispense: 0.5 mL; Refill: 0    Need for Td vaccine  Administered today.  -     (In Office Administered) Td Vaccine - Preservative Free    Hoarseness of voice  History of removal of vocal cord nodule, new onset hoarseness, referral to ENT.  -     Ambulatory referral to ENT    Actinic keratoses  Multiple noted on exam today to forearm and face, referral to dermatology for evaluation.  -     Ambulatory referral to Dermatology    Patient has verbalized understanding and is in agreement with plan of care.    Follow up in about 3 months (around 10/26/2019) for routine diabetes labs.

## 2019-07-27 ENCOUNTER — LAB VISIT (OUTPATIENT)
Dept: LAB | Facility: HOSPITAL | Age: 79
End: 2019-07-27
Attending: NURSE PRACTITIONER
Payer: MEDICARE

## 2019-07-27 DIAGNOSIS — K52.9 CHRONIC DIARRHEA: ICD-10-CM

## 2019-07-27 PROCEDURE — 87209 SMEAR COMPLEX STAIN: CPT | Mod: HCNC

## 2019-07-27 PROCEDURE — 87427 SHIGA-LIKE TOXIN AG IA: CPT | Mod: 59,HCNC

## 2019-07-27 PROCEDURE — 87045 FECES CULTURE AEROBIC BACT: CPT | Mod: HCNC

## 2019-07-27 PROCEDURE — 87046 STOOL CULTR AEROBIC BACT EA: CPT | Mod: 59,HCNC

## 2019-07-29 LAB
E COLI SXT1 STL QL IA: NEGATIVE
E COLI SXT2 STL QL IA: NEGATIVE
O+P STL TRI STN: ABNORMAL

## 2019-07-30 ENCOUNTER — TELEPHONE (OUTPATIENT)
Dept: FAMILY MEDICINE | Facility: CLINIC | Age: 79
End: 2019-07-30

## 2019-07-30 DIAGNOSIS — A09 DIARRHEA, INFECTIOUS, ADULT: Primary | ICD-10-CM

## 2019-07-30 RX ORDER — METRONIDAZOLE 500 MG/1
500 TABLET ORAL EVERY 12 HOURS
Qty: 14 TABLET | Refills: 0 | Status: SHIPPED | OUTPATIENT
Start: 2019-07-30 | End: 2019-08-06

## 2019-07-30 NOTE — TELEPHONE ENCOUNTER
Please contact the patient and see how he is doing, is he still having diarrhea?    Thanks!  ABEBA FuC

## 2019-07-30 NOTE — TELEPHONE ENCOUNTER
His still culture did show a parasite infection. This is usually self-limiting and resolves on its own. However, since he has had diarrhea for 2 months, I would like to treat him with an antibiotic. I am sending over a prescription for him to take twice daily for one week.     Thanks!  Corrine Gupta, CHATO-C

## 2019-07-31 LAB — BACTERIA STL CULT: NORMAL

## 2019-08-08 ENCOUNTER — TELEPHONE (OUTPATIENT)
Dept: FAMILY MEDICINE | Facility: CLINIC | Age: 79
End: 2019-08-08

## 2019-08-08 NOTE — TELEPHONE ENCOUNTER
No answer, unable to leave message. Mailing letter for patient to call back and schedule his referral appointments. Thanks.

## 2019-08-09 ENCOUNTER — PATIENT OUTREACH (OUTPATIENT)
Dept: OTHER | Facility: OTHER | Age: 79
End: 2019-08-09

## 2019-08-09 NOTE — PROGRESS NOTES
"Last 5 Patient Entered Readings                                      Current 30 Day Average: 134/73     Recent Readings 8/7/2019 8/7/2019 8/3/2019 8/3/2019 8/3/2019    SBP (mmHg) 136 138 126 134 143    DBP (mmHg) 72 73 65 69 72    Pulse 73 71 75 76 79          Last 6 Patient Entered Readings                                          Most Recent A1c: 7.1% on 7/22/2019  (Goal: 8%)     Recent Readings 8/7/2019 7/31/2019 7/24/2019 7/21/2019 7/18/2019    Blood Glucose (mg/dL) 123 105 118 95 166          Digital Medicine: Health  Follow Up    Lifestyle Modifications:    1.Dietary Modifications (Sodium intake <2,000mg/day, food labels, dining out): Patient stated that he has been doing much better with follow a low sodium/carb diet. He did "cheat" yesterday by having cake to celebrate he and his wife 59th wedding anniversary but other then that, he is tying to stay on track. He know it is helping keep his BS more controlled and his A1C has gone down from 8.1% to 7.1% within the last 3-4 months. Patient is very pleased with this and knows he can continue to do better to maintain his diet.     2.Physical Activity: Deferred    3.Medication Therapy: Patient has been compliant with the medication regimen. Patient is doing well on his current BP/Diabetes medication regimen. He denies symptoms/side effects.     4.Patient has the following medication side effects/concerns: None  (Frequency/Alleviating factors/Precipitating factors, etc.)     Follow up with Mr. James Mae Jr. completed. No further questions or concerns. Will continue to follow up to achieve health goals.        "

## 2019-08-20 ENCOUNTER — PES CALL (OUTPATIENT)
Dept: ADMINISTRATIVE | Facility: CLINIC | Age: 79
End: 2019-08-20

## 2019-09-13 ENCOUNTER — PATIENT OUTREACH (OUTPATIENT)
Dept: OTHER | Facility: OTHER | Age: 79
End: 2019-09-13

## 2019-09-13 NOTE — PROGRESS NOTES
Digital Medicine: Health  Follow-Up    The history is provided by the patient.     Follow Up  Follow-up reason(s): goal follow-up    Patient informed me that things have been a bit stressful in his family. His grandson visited him last month and after he left, he  of a heart attack. The patient has also been working through some of his own health issues but overall is doing well.    Patient will work on getting his eye exam scheduled. He will reach out if he needs any assistance. He goes to a non-[facility] doctor for this.     Patient continues working on his diet but some says are harder then others but he is still trying. He will reach out with specific questions.       Assessment/Plan    SDOH    Intervention/Plan        Topic    Eye Exam     Lipid (Cholesterol) Test        Last 5 Patient Entered Readings                                      Current 30 Day Average: 132/74     Recent Readings 2019 9/10/2019 9/10/2019 2019 2019    SBP (mmHg) 130 128 137 130 142    DBP (mmHg) 71 71 75 73 81    Pulse 81 69 73 71 76        Last 6 Patient Entered Readings                                          Most Recent A1c: 7.1% on 2019  (Goal: 8%)     Recent Readings 2019 9/10/2019 2019 2019 2019    Blood Glucose (mg/dL) 106 92 134 90 112

## 2019-09-16 ENCOUNTER — PATIENT OUTREACH (OUTPATIENT)
Dept: OTHER | Facility: OTHER | Age: 79
End: 2019-09-16

## 2019-09-16 NOTE — PROGRESS NOTES
Digital Medicine: Clinician Follow-Up    The history is provided by the patient.     Follow Up  Follow-up reason(s): reading review        1. HTN  HPI:  Called patient to follow up. Patient reports adherence to medication regimen daily and denies missed doses. Patient denies hypotensive s/sx (lightheadedness, dizziness, nausea, fatigue); patient denies hypertensive s/sx (SOB, CP, severe headaches, changes in vision, dizziness, fatigue, confusion, anxiety, nosebleeds).     Last 5 Patient Entered Readings                                      Current 30 Day Average: 132/74     Recent Readings 9/13/2019 9/10/2019 9/10/2019 9/7/2019 9/7/2019    SBP (mmHg) 130 128 137 130 142    DBP (mmHg) 71 71 75 73 81    Pulse 81 69 73 71 76        Assessment:  Reviewed recent readings. Per 2017 ACC/ AHA HTN guidelines (goal of BP < 130/80), current 30-day average is slightly uncontrolled.     Plan:  Continue current medication regimen.   Encouraged patient to charge digital cuff at least monthly.  Patients health , Ana Rosa Cason, will be following up as scheduled.   I will continue to monitor regularly and will follow-up in 12 weeks, sooner if blood pressure begins to trend upward or downward.     Current medication regimen:  Hypertension Medications             losartan (COZAAR) 100 MG tablet Take 1 tablet (100 mg total) by mouth once daily.    metoprolol succinate (TOPROL-XL) 50 MG 24 hr tablet TAKE 1 TABLET EVERY DAY    nitroGLYCERIN (NITROSTAT) 0.4 MG SL tablet Place 1 tablet (0.4 mg total) under the tongue every 5 (five) minutes as needed for Chest pain.         Patient denies having questions or concerns. Patient has my contact information and knows to call with any concerns or clinical changes.      2. DM  HPI:  Called patient to follow up regarding the DDMP (Diabetes Digital Medicine Program).  Patient reports he occasionally missed his evening doses of metformin and glipizide.   Patient denies hypoglycemic s/sx  (dizziness, extreme hunger, headaches, confusion, trouble concentrating, sweating, shaking, blurred vision, personality changes); patient denies hyperglycemic s/sx (increased thirst, increased urination, headaches, trouble concentrating, blurred vision, fatigue).    Last 6 Patient Entered Readings                                          Most Recent A1c: 7.1% on 7/22/2019  (Goal: 8%)     Recent Readings 9/13/2019 9/10/2019 9/7/2019 8/29/2019 8/23/2019    Blood Glucose (mg/dL) 106 92 134 90 112         Assessment:  Reviewed recent readings. Per AACE/ACE guidelines (goal A1C < 8%), DM is well controlled (A1C on 7/22 was 7.1%). Next A1C will be due in 1/2020.    Plan:  Continue current medication regimen.   Encouraged medication compliance.   Patients health , Ana Rosa Cason, will be following up as scheduled. I will continue to monitor regularly and will follow-up in 12 weeks, sooner if blood sugar begins to trend upward or downward.     Current Medication Regimen:  Diabetes Medications             glipiZIDE (GLUCOTROL) 10 MG tablet Take 1 tablet (10 mg total) by mouth 2 (two) times daily with meals.    metFORMIN (GLUCOPHAGE-XR) 500 MG 24 hr tablet TAKE 2 TABLETS TWICE DAILY WITH MEALS    SITagliptin (JANUVIA) 100 MG Tab Take 1 tablet (100 mg total) by mouth once daily.         Patient has my contact information and knows to call with any concerns or clinical changes.

## 2019-09-18 ENCOUNTER — PATIENT OUTREACH (OUTPATIENT)
Dept: OTHER | Facility: OTHER | Age: 79
End: 2019-09-18

## 2019-09-18 NOTE — PROGRESS NOTES
Digital Medicine: Health  Follow-Up        Follow Up  Follow-up reason(s): reading review    Patient called concerning symptoms of chest pain (lasted 45 minutes), right shoulder pain, cold sweats, stomach pain and slight nausea. Patient made it home and drank a coke. Before drinking the coke, his BG reading was 88. I requested that he take another reading for me right now so we can see if it came up.  Once he retook the reading, it went up to 103.     Patient is just wondering if he should go to ED or not.     After review his symptoms further and giving him some time, the symptoms resolved and he stated that he is feeling much better.    I did speak with another PharmD on staff, ALEJANDRO Posey, since the patients PharmD left for the day and she advised the patient to eat his dinner as soon as he can (since he skipped lunch) and that it is ok to take a 1-3 nitroglycerins (instructions were explain to him and his granddaughter) if the chest pain comes back and it does not subside.    If the chest pain comes back and it doesn't go away after taking 3 doses of nitroglycerin, I advised him to go to the ED.    I also advised him to reach out to he [facility] on call or go to the ED if the other symptoms arise, worsen and do not go away. Patient expressed understanding.       Assessment/Plan    [facility]    Intervention/Plan        Topic    Eye Exam     Lipid (Cholesterol) Test        Last 5 Patient Entered Readings                                      Current 30 Day Average: 130/73     Recent Readings 9/18/2019 9/13/2019 9/10/2019 9/10/2019 9/7/2019    SBP (mmHg) 124 130 128 137 130    DBP (mmHg) 65 71 71 75 73    Pulse 64 81 69 73 71        Last 6 Patient Entered Readings                                          Most Recent A1c: 7.1% on 7/22/2019  (Goal: 8%)     Recent Readings 9/18/2019 9/13/2019 9/10/2019 9/7/2019 8/29/2019    Blood Glucose (mg/dL) 88 106 92 134 90

## 2019-10-18 ENCOUNTER — PES CALL (OUTPATIENT)
Dept: ADMINISTRATIVE | Facility: CLINIC | Age: 79
End: 2019-10-18

## 2019-10-18 ENCOUNTER — PATIENT OUTREACH (OUTPATIENT)
Dept: OTHER | Facility: OTHER | Age: 79
End: 2019-10-18

## 2019-10-25 ENCOUNTER — OFFICE VISIT (OUTPATIENT)
Dept: FAMILY MEDICINE | Facility: CLINIC | Age: 79
End: 2019-10-25
Payer: MEDICARE

## 2019-10-25 VITALS
SYSTOLIC BLOOD PRESSURE: 132 MMHG | TEMPERATURE: 98 F | BODY MASS INDEX: 34.6 KG/M2 | OXYGEN SATURATION: 97 % | DIASTOLIC BLOOD PRESSURE: 80 MMHG | HEART RATE: 72 BPM | WEIGHT: 207.69 LBS | HEIGHT: 65 IN

## 2019-10-25 DIAGNOSIS — I10 ESSENTIAL HYPERTENSION, BENIGN: Chronic | ICD-10-CM

## 2019-10-25 DIAGNOSIS — J84.10 CALCIFIED GRANULOMA OF LUNG: ICD-10-CM

## 2019-10-25 DIAGNOSIS — Z23 NEED FOR VACCINATION: ICD-10-CM

## 2019-10-25 DIAGNOSIS — Z00.00 ENCOUNTER FOR PREVENTIVE HEALTH EXAMINATION: Primary | ICD-10-CM

## 2019-10-25 DIAGNOSIS — N18.30 TYPE 2 DIABETES MELLITUS WITH STAGE 3 CHRONIC KIDNEY DISEASE, WITHOUT LONG-TERM CURRENT USE OF INSULIN: Chronic | ICD-10-CM

## 2019-10-25 DIAGNOSIS — E66.9 OBESITY, CLASS I, BMI 30-34.9: Chronic | ICD-10-CM

## 2019-10-25 DIAGNOSIS — E11.22 TYPE 2 DIABETES MELLITUS WITH STAGE 3 CHRONIC KIDNEY DISEASE, WITHOUT LONG-TERM CURRENT USE OF INSULIN: Chronic | ICD-10-CM

## 2019-10-25 DIAGNOSIS — J61 PULMONARY ASBESTOSIS: Chronic | ICD-10-CM

## 2019-10-25 DIAGNOSIS — J44.9 CHRONIC OBSTRUCTIVE PULMONARY DISEASE, UNSPECIFIED COPD TYPE: Chronic | ICD-10-CM

## 2019-10-25 DIAGNOSIS — I70.0 ATHEROSCLEROSIS OF AORTA: Chronic | ICD-10-CM

## 2019-10-25 DIAGNOSIS — I25.10 CORONARY ARTERY DISEASE INVOLVING NATIVE CORONARY ARTERY WITHOUT ANGINA PECTORIS, UNSPECIFIED WHETHER NATIVE OR TRANSPLANTED HEART: Chronic | ICD-10-CM

## 2019-10-25 PROCEDURE — 99499 RISK ADDL DX/OHS AUDIT: ICD-10-PCS | Mod: S$GLB,,, | Performed by: NURSE PRACTITIONER

## 2019-10-25 PROCEDURE — G0439 PR MEDICARE ANNUAL WELLNESS SUBSEQUENT VISIT: ICD-10-PCS | Mod: HCNC,S$GLB,, | Performed by: NURSE PRACTITIONER

## 2019-10-25 PROCEDURE — 3075F SYST BP GE 130 - 139MM HG: CPT | Mod: HCNC,CPTII,S$GLB, | Performed by: NURSE PRACTITIONER

## 2019-10-25 PROCEDURE — 99999 PR PBB SHADOW E&M-EST. PATIENT-LVL V: CPT | Mod: PBBFAC,HCNC,, | Performed by: NURSE PRACTITIONER

## 2019-10-25 PROCEDURE — 3079F PR MOST RECENT DIASTOLIC BLOOD PRESSURE 80-89 MM HG: ICD-10-PCS | Mod: HCNC,CPTII,S$GLB, | Performed by: NURSE PRACTITIONER

## 2019-10-25 PROCEDURE — 3079F DIAST BP 80-89 MM HG: CPT | Mod: HCNC,CPTII,S$GLB, | Performed by: NURSE PRACTITIONER

## 2019-10-25 PROCEDURE — 99499 UNLISTED E&M SERVICE: CPT | Mod: S$GLB,,, | Performed by: NURSE PRACTITIONER

## 2019-10-25 PROCEDURE — 99999 PR PBB SHADOW E&M-EST. PATIENT-LVL V: ICD-10-PCS | Mod: PBBFAC,HCNC,, | Performed by: NURSE PRACTITIONER

## 2019-10-25 PROCEDURE — 3075F PR MOST RECENT SYSTOLIC BLOOD PRESS GE 130-139MM HG: ICD-10-PCS | Mod: HCNC,CPTII,S$GLB, | Performed by: NURSE PRACTITIONER

## 2019-10-25 PROCEDURE — G0439 PPPS, SUBSEQ VISIT: HCPCS | Mod: HCNC,S$GLB,, | Performed by: NURSE PRACTITIONER

## 2019-10-25 NOTE — PATIENT INSTRUCTIONS
Counseling and Referral of Other Preventative  (Italic type indicates deductible and co-insurance are waived)    Patient Name: James Mae  Today's Date: 10/25/2019    Health Maintenance       Date Due Completion Date    Aspirin/Antiplatelet Therapy 05/31/1960     Shingles Vaccine (1 of 2) 05/31/1992 Patient aware of recommendation for shingles vaccine.     Eye Exam 08/10/2018 8/10/2017    Override on 3/15/2016: Done ([facility]- Jas Cooper MD-No diabetic retinopathy detected-Comments: Cataracts. Patient should return for re-evaluation in 12 months.)    Override on 11/20/2014: Done (cataracts.  No DM change)    Lipid Panel 05/29/2019 5/29/2018, Patient aware of recommendation for updated lipid panel.     Influenza Vaccine (1) 09/01/2019 12/3/2018, Patient aware of recommendation for updated influenza vaccine.     Hemoglobin A1c 10/22/2019 7/22/2019    Foot Exam 07/26/2020 7/26/2019    Override on 7/26/2019: Done    TETANUS VACCINE 07/26/2029 7/26/2019        No orders of the defined types were placed in this encounter.    The following information is provided to all patients.  This information is to help you find resources for any of the problems found today that may be affecting your health:                Living healthy guide: www.[facility].[place].gov      Understanding Diabetes: www.diabetes.org      Eating healthy: www.cdc.gov/healthyweight      CDC home safety checklist: www.cdc.gov/steadi/patient.html      Agency on Aging: www.goea.[place].gov      Alcoholics anonymous (AA): www.aa.org      Physical Activity: www.iva.nih.gov/km2pzpd      Tobacco use: www.quitwithusla.org

## 2019-10-25 NOTE — PROGRESS NOTES
James Mae presented for a  Medicare AWV and comprehensive Health Risk Assessment today. The following components were reviewed and updated:    · Medical history  · Family History  · Social history  · Allergies and Current Medications  · Health Risk Assessment  · Health Maintenance  · Care Team     ** See Completed Assessments for Annual Wellness Visit within the encounter summary.**       The following assessments were completed:  · Living Situation  · CAGE  · Depression Screening  · Timed Get Up and Go  · Whisper Test  · Cognitive Function Screening  · Nutrition Screening  · ADL Screening  · PAQ Screening    There were no vitals filed for this visit.  There is no height or weight on file to calculate BMI.  Physical Exam   Constitutional: He is oriented to person, place, and time. He appears well-developed and well-nourished.   HENT:   Head: Normocephalic and atraumatic.   Cardiovascular: Normal rate, regular rhythm and normal heart sounds.   Pulmonary/Chest: Effort normal and breath sounds normal. No respiratory distress. He has no decreased breath sounds.   Neurological: He is alert and oriented to person, place, and time.   Skin: He is not diaphoretic. No pallor.   Psychiatric: He has a normal mood and affect. His speech is normal and behavior is normal.         Diagnoses and health risks identified today and associated recommendations/orders:    There are no diagnoses linked to this encounter.    Provided James with a 5-10 year written screening schedule and personal prevention plan. Recommendations were developed using the USPSTF age appropriate recommendations. Education, counseling, and referrals were provided as needed. After Visit Summary printed and given to patient which includes a list of additional screenings\tests needed.    No follow-ups on file.    ESA Bryson

## 2019-10-25 NOTE — PROGRESS NOTES
"James Mae presented for a  Medicare AWV and comprehensive Health Risk Assessment today. The following components were reviewed and updated:    · Medical history  · Family History  · Social history  · Allergies and Current Medications  · Health Risk Assessment  · Health Maintenance  · Care Team     ** See Completed Assessments for Annual Wellness Visit within the encounter summary.**       The following assessments were completed:  · Living Situation  · CAGE  · Depression Screening  · Timed Get Up and Go  · Whisper Test  · Cognitive Function Screening  ·   ·   · Nutrition Screening  · ADL Screening  · PAQ Screening    Vitals:    10/25/19 0940   BP: 132/80   BP Location: Left arm   Patient Position: Sitting   BP Method: Large (Manual)   Pulse: 72   Temp: 98 °F (36.7 °C)   SpO2: 97%   Weight: 94.2 kg (207 lb 10.8 oz)   Height: 5' 5" (1.651 m)     Body mass index is 34.56 kg/m².  Physical Exam   Constitutional: He is oriented to person, place, and time.   Cardiovascular: Normal rate and regular rhythm.   Pulmonary/Chest: Effort normal.   Musculoskeletal: Normal range of motion. He exhibits edema (bilateral +1 pretibial edema ).   Neurological: He is alert and oriented to person, place, and time.   Skin: Skin is warm. Capillary refill takes less than 2 seconds.   Psychiatric: He has a normal mood and affect. His behavior is normal. Thought content normal.   Vitals reviewed.        Diagnoses and health risks identified today and associated recommendations/orders:    1. Encounter for preventive health examination  Education provided about preventive health examinations and procedures; addressed and discussed patient's health concerns. Additionally, reviewed medical record for risk factors and documented the results during this encounter.    2. Calcified granuloma of lung  Stable and monitored. Continue current treatment plan as previously prescribed.     3. Atherosclerosis of aorta  Stable and monitored. Continue " current treatment plan as previously prescribed.     4. Type 2 diabetes mellitus with stage 3 chronic kidney disease, without long-term current use of insulin  Education provided about diabetes, management of blood glucose with diet and activities, monitoring for worsening effects of diabetes.  Reviewed most recent Ha1c and informed patient of complications associated with uncontrolled diabetes.     5. Pulmonary asbestosis  Stable and monitored. Continue current treatment plan as previously prescribed.     6. Chronic obstructive pulmonary disease, unspecified COPD type  Stable and monitored. Continue current treatment plan as previously prescribed.     7. Essential hypertension, benign  Presently at goal. Continue as advised regarding dietary and lifestyle modifications.     8. Coronary artery disease involving native coronary artery without angina pectoris, unspecified whether native or transplanted heart  Stable and monitored. Continue current treatment plan as previously prescribed.     9. Obesity, Class I, BMI 30-34.9  Encouraged tolerable fitness activities, we reviewed weight and co-morbidities     10. Need for vaccination  Patient aware of recommendation for shingles vaccine; patient declined at today's visit.       Provided James with a 5-10 year written screening schedule and personal prevention plan. Recommendations were developed using the USPSTF age appropriate recommendations. Education, counseling, and referrals were provided as needed. After Visit Summary printed and given to patient which includes a list of additional screenings\tests needed.    Follow up in about 1 year (around 10/25/2020) for assessment .    Gm Vazquez Jr, NP  I offered to discuss end of life issues, including information on how to make advance directives that the patient could use to name someone who would make medical decisions on their behalf if they became too ill to make themselves.    ___Patient declined  _X_Patient is  interested, I provided paper work and offered to discuss.

## 2019-10-26 ENCOUNTER — LAB VISIT (OUTPATIENT)
Dept: LAB | Facility: HOSPITAL | Age: 79
End: 2019-10-26
Attending: INTERNAL MEDICINE
Payer: MEDICARE

## 2019-10-26 DIAGNOSIS — E78.2 MIXED HYPERLIPIDEMIA: Chronic | ICD-10-CM

## 2019-10-26 DIAGNOSIS — E11.22 TYPE 2 DIABETES MELLITUS WITH STAGE 3 CHRONIC KIDNEY DISEASE, WITHOUT LONG-TERM CURRENT USE OF INSULIN: Chronic | ICD-10-CM

## 2019-10-26 DIAGNOSIS — N18.30 TYPE 2 DIABETES MELLITUS WITH STAGE 3 CHRONIC KIDNEY DISEASE, WITHOUT LONG-TERM CURRENT USE OF INSULIN: Chronic | ICD-10-CM

## 2019-10-26 LAB
ANION GAP SERPL CALC-SCNC: 10 MMOL/L (ref 8–16)
BUN SERPL-MCNC: 29 MG/DL (ref 8–23)
CALCIUM SERPL-MCNC: 9.4 MG/DL (ref 8.7–10.5)
CHLORIDE SERPL-SCNC: 109 MMOL/L (ref 95–110)
CHOLEST SERPL-MCNC: 144 MG/DL (ref 120–199)
CHOLEST/HDLC SERPL: 4.4 {RATIO} (ref 2–5)
CO2 SERPL-SCNC: 20 MMOL/L (ref 23–29)
CREAT SERPL-MCNC: 1.4 MG/DL (ref 0.5–1.4)
EST. GFR  (AFRICAN AMERICAN): 55.6 ML/MIN/1.73 M^2
EST. GFR  (NON AFRICAN AMERICAN): 48.1 ML/MIN/1.73 M^2
ESTIMATED AVG GLUCOSE: 148 MG/DL (ref 68–131)
GLUCOSE SERPL-MCNC: 100 MG/DL (ref 70–110)
HBA1C MFR BLD HPLC: 6.8 % (ref 4–5.6)
HDLC SERPL-MCNC: 33 MG/DL (ref 40–75)
HDLC SERPL: 22.9 % (ref 20–50)
LDLC SERPL CALC-MCNC: 72.4 MG/DL (ref 63–159)
NONHDLC SERPL-MCNC: 111 MG/DL
POTASSIUM SERPL-SCNC: 5.1 MMOL/L (ref 3.5–5.1)
SODIUM SERPL-SCNC: 139 MMOL/L (ref 136–145)
TRIGL SERPL-MCNC: 193 MG/DL (ref 30–150)

## 2019-10-26 PROCEDURE — 83036 HEMOGLOBIN GLYCOSYLATED A1C: CPT | Mod: HCNC

## 2019-10-26 PROCEDURE — 80061 LIPID PANEL: CPT | Mod: HCNC

## 2019-10-26 PROCEDURE — 36415 COLL VENOUS BLD VENIPUNCTURE: CPT | Mod: HCNC,PO

## 2019-10-26 PROCEDURE — 80048 BASIC METABOLIC PNL TOTAL CA: CPT | Mod: HCNC

## 2019-10-28 ENCOUNTER — TELEPHONE (OUTPATIENT)
Dept: FAMILY MEDICINE | Facility: CLINIC | Age: 79
End: 2019-10-28

## 2019-10-28 NOTE — TELEPHONE ENCOUNTER
Notified patient, pt verbalized understanding.     Patient appointment scheduled with Dr. Bee 11/14 11:00am.

## 2019-10-28 NOTE — TELEPHONE ENCOUNTER
Please let the patient know that his labs are overall looking good. His hemoglobin A1c has gone down to 6.8! He is due for a routine follow-up with Dr. Bee. Labs can be discussed further at that time. Please schedule.    Thanks!  MEENA Fu

## 2019-11-04 DIAGNOSIS — E11.22 TYPE 2 DIABETES MELLITUS WITH STAGE 3 CHRONIC KIDNEY DISEASE, WITHOUT LONG-TERM CURRENT USE OF INSULIN: Chronic | ICD-10-CM

## 2019-11-04 DIAGNOSIS — M1A.9XX0 CHRONIC GOUT WITHOUT TOPHUS, UNSPECIFIED CAUSE, UNSPECIFIED SITE: ICD-10-CM

## 2019-11-04 DIAGNOSIS — I25.10 CORONARY ARTERY DISEASE INVOLVING NATIVE CORONARY ARTERY WITHOUT ANGINA PECTORIS, UNSPECIFIED WHETHER NATIVE OR TRANSPLANTED HEART: Chronic | ICD-10-CM

## 2019-11-04 DIAGNOSIS — N18.30 TYPE 2 DIABETES MELLITUS WITH STAGE 3 CHRONIC KIDNEY DISEASE, WITHOUT LONG-TERM CURRENT USE OF INSULIN: Chronic | ICD-10-CM

## 2019-11-04 DIAGNOSIS — E78.2 MIXED HYPERLIPIDEMIA: Chronic | ICD-10-CM

## 2019-11-05 RX ORDER — ATORVASTATIN CALCIUM 40 MG/1
TABLET, FILM COATED ORAL
Qty: 90 TABLET | Refills: 0 | Status: SHIPPED | OUTPATIENT
Start: 2019-11-05 | End: 2020-02-20

## 2019-11-05 RX ORDER — ALLOPURINOL 300 MG/1
TABLET ORAL
Qty: 90 TABLET | Refills: 0 | Status: SHIPPED | OUTPATIENT
Start: 2019-11-05 | End: 2020-02-20

## 2019-11-05 RX ORDER — METFORMIN HYDROCHLORIDE 500 MG/1
TABLET, EXTENDED RELEASE ORAL
Qty: 360 TABLET | Refills: 0 | Status: SHIPPED | OUTPATIENT
Start: 2019-11-05 | End: 2020-02-20

## 2019-11-05 RX ORDER — METOPROLOL SUCCINATE 50 MG/1
TABLET, EXTENDED RELEASE ORAL
Qty: 90 TABLET | Refills: 0 | Status: SHIPPED | OUTPATIENT
Start: 2019-11-05 | End: 2020-02-20

## 2019-11-14 ENCOUNTER — OFFICE VISIT (OUTPATIENT)
Dept: FAMILY MEDICINE | Facility: CLINIC | Age: 79
End: 2019-11-14
Payer: MEDICARE

## 2019-11-14 VITALS
BODY MASS INDEX: 34.32 KG/M2 | HEIGHT: 65 IN | HEART RATE: 79 BPM | WEIGHT: 206 LBS | DIASTOLIC BLOOD PRESSURE: 60 MMHG | TEMPERATURE: 98 F | SYSTOLIC BLOOD PRESSURE: 128 MMHG | OXYGEN SATURATION: 98 %

## 2019-11-14 DIAGNOSIS — R09.82 POST-NASAL DRIP: ICD-10-CM

## 2019-11-14 DIAGNOSIS — I10 ESSENTIAL HYPERTENSION, BENIGN: Chronic | ICD-10-CM

## 2019-11-14 DIAGNOSIS — E11.22 TYPE 2 DIABETES MELLITUS WITH STAGE 3 CHRONIC KIDNEY DISEASE, WITHOUT LONG-TERM CURRENT USE OF INSULIN: Chronic | ICD-10-CM

## 2019-11-14 DIAGNOSIS — Z00.00 ROUTINE MEDICAL EXAM: Primary | ICD-10-CM

## 2019-11-14 DIAGNOSIS — I70.0 ATHEROSCLEROSIS OF AORTA: Chronic | ICD-10-CM

## 2019-11-14 DIAGNOSIS — E66.9 OBESITY, CLASS I, BMI 30-34.9: Chronic | ICD-10-CM

## 2019-11-14 DIAGNOSIS — N18.30 TYPE 2 DIABETES MELLITUS WITH STAGE 3 CHRONIC KIDNEY DISEASE, WITHOUT LONG-TERM CURRENT USE OF INSULIN: Chronic | ICD-10-CM

## 2019-11-14 DIAGNOSIS — I25.10 CORONARY ARTERY DISEASE INVOLVING NATIVE CORONARY ARTERY WITHOUT ANGINA PECTORIS, UNSPECIFIED WHETHER NATIVE OR TRANSPLANTED HEART: Chronic | ICD-10-CM

## 2019-11-14 DIAGNOSIS — K52.9 CHRONIC DIARRHEA: ICD-10-CM

## 2019-11-14 DIAGNOSIS — E78.2 MIXED HYPERLIPIDEMIA: Chronic | ICD-10-CM

## 2019-11-14 PROCEDURE — 3078F DIAST BP <80 MM HG: CPT | Mod: HCNC,CPTII,S$GLB, | Performed by: INTERNAL MEDICINE

## 2019-11-14 PROCEDURE — 99999 PR PBB SHADOW E&M-EST. PATIENT-LVL IV: CPT | Mod: PBBFAC,HCNC,, | Performed by: INTERNAL MEDICINE

## 2019-11-14 PROCEDURE — 3078F PR MOST RECENT DIASTOLIC BLOOD PRESSURE < 80 MM HG: ICD-10-PCS | Mod: HCNC,CPTII,S$GLB, | Performed by: INTERNAL MEDICINE

## 2019-11-14 PROCEDURE — 3074F SYST BP LT 130 MM HG: CPT | Mod: HCNC,CPTII,S$GLB, | Performed by: INTERNAL MEDICINE

## 2019-11-14 PROCEDURE — 99397 PR PREVENTIVE VISIT,EST,65 & OVER: ICD-10-PCS | Mod: HCNC,S$GLB,, | Performed by: INTERNAL MEDICINE

## 2019-11-14 PROCEDURE — 3074F PR MOST RECENT SYSTOLIC BLOOD PRESSURE < 130 MM HG: ICD-10-PCS | Mod: HCNC,CPTII,S$GLB, | Performed by: INTERNAL MEDICINE

## 2019-11-14 PROCEDURE — 99397 PER PM REEVAL EST PAT 65+ YR: CPT | Mod: HCNC,S$GLB,, | Performed by: INTERNAL MEDICINE

## 2019-11-14 PROCEDURE — 99999 PR PBB SHADOW E&M-EST. PATIENT-LVL IV: ICD-10-PCS | Mod: PBBFAC,HCNC,, | Performed by: INTERNAL MEDICINE

## 2019-11-14 RX ORDER — CETIRIZINE HYDROCHLORIDE 10 MG/1
10 TABLET ORAL NIGHTLY
Qty: 90 TABLET | Refills: 1 | COMMUNITY
Start: 2019-11-14

## 2019-11-14 RX ORDER — CHOLESTYRAMINE 4 G/4.8G
1 POWDER, FOR SUSPENSION ORAL 2 TIMES DAILY
Qty: 180 PACKET | Refills: 0 | Status: SHIPPED | OUTPATIENT
Start: 2019-11-14 | End: 2021-11-22 | Stop reason: SDUPTHER

## 2019-11-14 NOTE — ASSESSMENT & PLAN NOTE
Having some chest heaviness with exertion.  Needs follow up with cards.  If reassuring Eval, will look into COPD or deconditioning as a cause

## 2019-11-14 NOTE — ASSESSMENT & PLAN NOTE
The current medical regimen is effective;  continue present plan and medications. Counseled on increasing hydration.

## 2019-11-14 NOTE — PROGRESS NOTES
Assessment & Plan  Problem List Items Addressed This Visit        ENT    Post-nasal drip (Chronic)    Current Assessment & Plan     Start cetirizine.           Relevant Medications    cetirizine (ZYRTEC) 10 MG tablet       Cardiac/Vascular    Mixed hyperlipidemia (Chronic)    Current Assessment & Plan     The current medical regimen is effective;  continue present plan and medications.          Essential hypertension, benign (Chronic)    Current Assessment & Plan     The current medical regimen is effective;  continue present plan and medications.          CAD (coronary artery disease) (Chronic)    Overview     S/p 2 stents around 06-07         Current Assessment & Plan     Having some chest heaviness with exertion.  Needs follow up with cards.  If reassuring Eval, will look into COPD or deconditioning as a cause         Relevant Orders    Ambulatory referral to Cardiology    Atherosclerosis of aorta (Chronic)    Overview     CT chest April 2014.  Stable, asymptomatic chronic condition.  Will continue to maximize risk factor reduction and adjust medication as needed.             Endocrine    Type 2 diabetes mellitus with stage 3 chronic kidney disease, without long-term current use of insulin (Chronic)    Overview     Enrolled in digital DM         Current Assessment & Plan     The current medical regimen is effective;  continue present plan and medications. Counseled on increasing hydration.          Relevant Orders    Magnesium    Phosphorus    Vitamin D    PTH, intact    Protein / creatinine ratio, urine    Hemoglobin A1c    Comprehensive metabolic panel    Obesity, Class I, BMI 30-34.9 (Chronic)    Current Assessment & Plan     The patient's BMI has been recorded in the chart. The patient has been provided educational materials regarding the benefits of attaining and maintaining a normal weight. We will continue to address and follow this issue during follow up visits.            GI    Chronic diarrhea (Chronic)     Current Assessment & Plan     Trial of cholestyramine          Relevant Medications    cholestyramine-aspartame (QUESTRAN LIGHT) 4 gram PwPk      Other Visit Diagnoses     Routine medical exam    -  Primary  -    Discussed healthy diet, regular exercise, necessary labs, age appropriate cancer screening, and routine vaccinations.               [facility]    Follow-up: Follow up in about 6 months (around 5/14/2020) for follow up for chronic conditions.    ______________________________________________________________________    Chief Complaint  Chief Complaint   Patient presents with    Annual Exam       HPI  James Mae . is a 79 y.o. male with multiple medical diagnoses as listed in the medical history and problem list that presents for routine physical.  Pt is known to me with his last appointment 10/25/2019.  He had labs prior to this OV that showed controlled lipids and A1c.  BMP with slight bump in his Cr but generally stable over the last several years.     Continues to be followed by digital HTN and DM.     Taking and tolerating medications as prescribed without perceived side effects.  No CP, SOB, palpitations, hypoglycemic symptoms.  He does endorse some MARTINEZ/chest tightness when he tries to walk at a quicker pace or cut the grass.  Changed toa riding lawnmower.       He is c/o chronic diarrhea for over a year.  Denies change with medication changes.  Hasn't taken anything for this.  No BRBPR, melena, mucus.  Green in color.  + fecal urgency.     PAST MEDICAL HISTORY:  Past Medical History:   Diagnosis Date    Arthritis     Asbestosis     Cancer     reports skin cancer (10/25/19) right temporal area, monitored by [facility]     COPD with asthma     Coronary artery disease     Diabetes mellitus type II     Hyperlipidemia     Hypertension     Obesity     Renal manifestation of secondary diabetes mellitus     Trouble in sleeping         PAST SURGICAL HISTORY:  Past Surgical History:   Procedure Laterality Date    APPENDECTOMY      8 yrs ago    CHOLECYSTECTOMY      8 yrs ago    CORONARY STENT PLACEMENT      SMALL INTESTINE SURGERY      8 yrs       SOCIAL HISTORY:  Social History     Socioeconomic History    Marital status:      Spouse name: Not on file    Number of children: Not on file    Years of education: Not on file    Highest education level: Not on file   Occupational History    Not on file   Social Needs    Financial resource strain: Not on file    Food insecurity:     Worry: Not on file     Inability: Not on file    Transportation needs:     Medical: Not on file     Non-medical: Not on file   Tobacco Use    Smoking status: Never Smoker    Smokeless tobacco: Never Used   Substance and Sexual Activity    Alcohol use: No    Drug use: No    Sexual activity: Yes     Partners: Female   Lifestyle    Physical activity:     Days per week: Not on file     Minutes per session: Not on file    Stress: Not on file   Relationships    Social connections:     Talks on phone: Not on file     Gets together: Not on file     Attends [employer] service: Not on file     Active member of club or organization: Not on file     Attends meetings of clubs or organizations: Not on file     Relationship status: Not on file   Other Topics Concern    Not on file   Social History Narrative    Not on file       FAMILY HISTORY:  Family History   Problem Relation Age of Onset    Cancer Mother         throat    Heart disease Father         heart attack       ALLERGIES AND MEDICATIONS: updated and reviewed.  Review of patient's allergies indicates:  No Known Allergies  Current Outpatient Medications   Medication Sig Dispense Refill    albuterol (PROAIR HFA) 90 mcg/actuation inhaler Inhale 2 puffs into the lungs every 6 (six) hours as needed for Wheezing. 3 Inhaler 3    allopurinol (ZYLOPRIM) 300 MG tablet TAKE 1 TABLET EVERY DAY 90 tablet 0     aspirin (ECOTRIN) 81 MG EC tablet Take 1 tablet (81 mg total) by mouth once daily. (Patient taking differently: Take 162 mg by mouth once daily. ) 90 tablet 0    atorvastatin (LIPITOR) 40 MG tablet TAKE 1 TABLET EVERY DAY 90 tablet 0    ergocalciferol (ERGOCALCIFEROL) 50,000 unit Cap Take 1 capsule PO ONCE A WEEK 12 capsule 4    fluticasone (FLONASE) 50 mcg/actuation nasal spray 2 sprays (100 mcg total) by Each Nare route once daily. 15 g 0    fluticasone-vilanterol (BREO ELLIPTA) 200-25 mcg/dose DsDv diskus inhaler Inhale 1 puff into the lungs once daily. 60 each 5    glipiZIDE (GLUCOTROL) 10 MG tablet Take 1 tablet (10 mg total) by mouth 2 (two) times daily with meals. 180 tablet 3    ibuprofen (ADVIL,MOTRIN) 600 MG tablet Take 1 tablet (600 mg total) by mouth 3 (three) times daily. 30 tablet 0    losartan (COZAAR) 100 MG tablet Take 1 tablet (100 mg total) by mouth once daily. 90 tablet 3    metFORMIN (GLUCOPHAGE-XR) 500 MG 24 hr tablet TAKE 2 TABLETS TWICE DAILY WITH MEALS 360 tablet 0    metoprolol succinate (TOPROL-XL) 50 MG 24 hr tablet TAKE 1 TABLET EVERY DAY 90 tablet 0    SITagliptin (JANUVIA) 100 MG Tab Take 1 tablet (100 mg total) by mouth once daily. 90 tablet 3    azelastine (ASTELIN) 137 mcg (0.1 %) nasal spray 2 sprays (274 mcg total) by Nasal route 2 (two) times daily. 30 mL 0    blood sugar diagnostic (BLOOD GLUCOSE TEST) Strp Accu- Check  Smartview Test Strips. Test BID (Patient not taking: Reported on 11/14/2019) 100 strip 11    cetirizine (ZYRTEC) 10 MG tablet Take 1 tablet (10 mg total) by mouth every evening. 90 tablet 1    cholestyramine-aspartame (QUESTRAN LIGHT) 4 gram PwPk Take 1 packet (4 g total) by mouth 2 (two) times daily. 180 packet 0    lancets (ONETOUCH ULTRASOFT LANCETS) Misc Fastclix Lancets. Test BID (Patient not taking: Reported on 11/14/2019) 100 each 11    meclizine (ANTIVERT) 25 mg tablet Take 1 tablet (25 mg total) by mouth 3 (three) times daily as  "needed for Dizziness. (Patient not taking: Reported on 10/25/2019) 90 tablet 0    nitroGLYCERIN (NITROSTAT) 0.4 MG SL tablet Place 1 tablet (0.4 mg total) under the tongue every 5 (five) minutes as needed for Chest pain. 25 tablet 11     No current facility-administered medications for this visit.          ROS  Review of Systems   Constitutional: Negative for chills, fever and unexpected weight change.   HENT: Negative for congestion, dental problem, ear pain, hearing loss, rhinorrhea, sore throat and trouble swallowing.    Eyes: Negative for discharge, redness and visual disturbance.   Respiratory: Negative for cough, chest tightness, shortness of breath and wheezing.    Cardiovascular: Negative for chest pain, palpitations and leg swelling.   Gastrointestinal: Negative for abdominal pain, constipation, diarrhea, nausea and vomiting.   Endocrine: Negative for polydipsia, polyphagia and polyuria.   Genitourinary: Negative for decreased urine volume, dysuria and hematuria.   Musculoskeletal: Negative for arthralgias and myalgias.   Skin: Negative for color change and rash.   Neurological: Negative for dizziness, weakness, light-headedness and headaches.   Psychiatric/Behavioral: Negative for decreased concentration, dysphoric mood, sleep disturbance and suicidal ideas.           Physical Exam  Vitals:    11/14/19 1057   BP: 128/60   Pulse: 79   Temp: 98.3 °F (36.8 °C)   SpO2: 98%   Weight: 93.5 kg (206 lb 0.3 oz)   Height: 5' 5" (1.651 m)    Body mass index is 34.28 kg/m².  Weight: 93.5 kg (206 lb 0.3 oz)   Height: 5' 5" (165.1 cm)   Physical Exam   Constitutional: He is oriented to person, place, and time. He appears well-developed and well-nourished. No distress.   HENT:   Head: Normocephalic and atraumatic.   Right Ear: External ear and ear canal normal.   Left Ear: External ear and ear canal normal.   Nose: Nose normal. No septal deviation. Right sinus exhibits no maxillary sinus tenderness and no frontal sinus " tenderness. Left sinus exhibits no maxillary sinus tenderness and no frontal sinus tenderness.   Mouth/Throat: Uvula is midline, oropharynx is clear and moist and mucous membranes are normal. No tonsillar exudate.   Eyes: Pupils are equal, round, and reactive to light. Conjunctivae and EOM are normal. Right eye exhibits no discharge. Left eye exhibits no discharge. No scleral icterus.   Neck: Neck supple. No JVD present. No spinous process tenderness and no muscular tenderness present. Carotid bruit is not present. No tracheal deviation present. No thyroid mass and no thyromegaly present.   Cardiovascular: Normal rate, regular rhythm, normal heart sounds and intact distal pulses. Exam reveals no S3, no S4 and no friction rub.   Pulmonary/Chest: Effort normal and breath sounds normal. He has no wheezes. He has no rhonchi. He has no rales.   Abdominal: Soft. Bowel sounds are normal. He exhibits no distension. There is no tenderness. There is no rebound, no guarding and no CVA tenderness.   Musculoskeletal: Normal range of motion. He exhibits no edema or tenderness.   Lymphadenopathy:        Head (right side): No submental and no submandibular adenopathy present.        Head (left side): No submental and no submandibular adenopathy present.     He has no cervical adenopathy.   Neurological: He is alert and oriented to person, place, and time. Coordination normal.   Motor grossly intact.  Sensation grossly normal.  Symmetric facial movements palate elevated symmetrically tongue midline    Skin: Skin is warm and dry. Capillary refill takes less than 2 seconds. No rash noted. No cyanosis. Nails show no clubbing.   Psychiatric: He has a normal mood and affect. His speech is normal and behavior is normal. Thought content normal. Cognition and memory are normal.         Health Maintenance       Date Due Completion Date    Aspirin/Antiplatelet Therapy 05/31/1960 ---    Shingles Vaccine (1 of 2) 05/31/1992 ---    Eye Exam  08/10/2018 8/10/2017    Override on 3/15/2016: Done ([facility]- Jas Cooper MD-No diabetic retinopathy detected-Comments: Cataracts. Patient should return for re-evaluation in 12 months.)    Override on 11/20/2014: Done (cataracts.  No DM change)    Influenza Vaccine (1) 09/01/2019 12/3/2018    Hemoglobin A1c 01/26/2020 10/26/2019    Foot Exam 07/26/2020 7/26/2019    Override on 7/26/2019: Done    Lipid Panel 10/26/2020 10/26/2019    TETANUS VACCINE 07/26/2029 7/26/2019

## 2019-11-29 ENCOUNTER — TELEPHONE (OUTPATIENT)
Dept: FAMILY MEDICINE | Facility: CLINIC | Age: 79
End: 2019-11-29

## 2019-11-29 ENCOUNTER — OFFICE VISIT (OUTPATIENT)
Dept: FAMILY MEDICINE | Facility: CLINIC | Age: 79
End: 2019-11-29
Payer: MEDICARE

## 2019-11-29 VITALS
DIASTOLIC BLOOD PRESSURE: 70 MMHG | HEART RATE: 68 BPM | HEIGHT: 67 IN | SYSTOLIC BLOOD PRESSURE: 130 MMHG | OXYGEN SATURATION: 97 % | BODY MASS INDEX: 32.24 KG/M2 | WEIGHT: 205.38 LBS | TEMPERATURE: 99 F

## 2019-11-29 DIAGNOSIS — E66.9 OBESITY, CLASS I, BMI 30-34.9: Chronic | ICD-10-CM

## 2019-11-29 DIAGNOSIS — E11.22 TYPE 2 DIABETES MELLITUS WITH STAGE 3 CHRONIC KIDNEY DISEASE, WITHOUT LONG-TERM CURRENT USE OF INSULIN: Chronic | ICD-10-CM

## 2019-11-29 DIAGNOSIS — I10 ESSENTIAL HYPERTENSION, BENIGN: Chronic | ICD-10-CM

## 2019-11-29 DIAGNOSIS — H60.312 ACUTE DIFFUSE OTITIS EXTERNA OF LEFT EAR: Primary | ICD-10-CM

## 2019-11-29 DIAGNOSIS — J84.10 CALCIFIED GRANULOMA OF LUNG: ICD-10-CM

## 2019-11-29 DIAGNOSIS — J44.9 CHRONIC OBSTRUCTIVE PULMONARY DISEASE, UNSPECIFIED COPD TYPE: Chronic | ICD-10-CM

## 2019-11-29 DIAGNOSIS — J61 PULMONARY ASBESTOSIS: Chronic | ICD-10-CM

## 2019-11-29 DIAGNOSIS — N18.30 TYPE 2 DIABETES MELLITUS WITH STAGE 3 CHRONIC KIDNEY DISEASE, WITHOUT LONG-TERM CURRENT USE OF INSULIN: Chronic | ICD-10-CM

## 2019-11-29 DIAGNOSIS — I70.0 ATHEROSCLEROSIS OF AORTA: Chronic | ICD-10-CM

## 2019-11-29 PROCEDURE — 99999 PR PBB SHADOW E&M-EST. PATIENT-LVL V: CPT | Mod: PBBFAC,HCNC,, | Performed by: NURSE PRACTITIONER

## 2019-11-29 PROCEDURE — 3078F PR MOST RECENT DIASTOLIC BLOOD PRESSURE < 80 MM HG: ICD-10-PCS | Mod: HCNC,CPTII,S$GLB, | Performed by: NURSE PRACTITIONER

## 2019-11-29 PROCEDURE — 1125F AMNT PAIN NOTED PAIN PRSNT: CPT | Mod: HCNC,S$GLB,, | Performed by: NURSE PRACTITIONER

## 2019-11-29 PROCEDURE — 3078F DIAST BP <80 MM HG: CPT | Mod: HCNC,CPTII,S$GLB, | Performed by: NURSE PRACTITIONER

## 2019-11-29 PROCEDURE — 99999 PR PBB SHADOW E&M-EST. PATIENT-LVL V: ICD-10-PCS | Mod: PBBFAC,HCNC,, | Performed by: NURSE PRACTITIONER

## 2019-11-29 PROCEDURE — 3075F SYST BP GE 130 - 139MM HG: CPT | Mod: HCNC,CPTII,S$GLB, | Performed by: NURSE PRACTITIONER

## 2019-11-29 PROCEDURE — 3075F PR MOST RECENT SYSTOLIC BLOOD PRESS GE 130-139MM HG: ICD-10-PCS | Mod: HCNC,CPTII,S$GLB, | Performed by: NURSE PRACTITIONER

## 2019-11-29 PROCEDURE — 1101F PT FALLS ASSESS-DOCD LE1/YR: CPT | Mod: HCNC,CPTII,S$GLB, | Performed by: NURSE PRACTITIONER

## 2019-11-29 PROCEDURE — 1101F PR PT FALLS ASSESS DOC 0-1 FALLS W/OUT INJ PAST YR: ICD-10-PCS | Mod: HCNC,CPTII,S$GLB, | Performed by: NURSE PRACTITIONER

## 2019-11-29 PROCEDURE — 99214 OFFICE O/P EST MOD 30 MIN: CPT | Mod: HCNC,S$GLB,, | Performed by: NURSE PRACTITIONER

## 2019-11-29 PROCEDURE — 1159F MED LIST DOCD IN RCRD: CPT | Mod: HCNC,S$GLB,, | Performed by: NURSE PRACTITIONER

## 2019-11-29 PROCEDURE — 1125F PR PAIN SEVERITY QUANTIFIED, PAIN PRESENT: ICD-10-PCS | Mod: HCNC,S$GLB,, | Performed by: NURSE PRACTITIONER

## 2019-11-29 PROCEDURE — 1159F PR MEDICATION LIST DOCUMENTED IN MEDICAL RECORD: ICD-10-PCS | Mod: HCNC,S$GLB,, | Performed by: NURSE PRACTITIONER

## 2019-11-29 PROCEDURE — 99214 PR OFFICE/OUTPT VISIT, EST, LEVL IV, 30-39 MIN: ICD-10-PCS | Mod: HCNC,S$GLB,, | Performed by: NURSE PRACTITIONER

## 2019-11-29 RX ORDER — NEOMYCIN SULFATE, POLYMYXIN B SULFATE AND HYDROCORTISONE 10; 3.5; 1 MG/ML; MG/ML; [USP'U]/ML
3 SUSPENSION/ DROPS AURICULAR (OTIC) 3 TIMES DAILY
Qty: 6 ML | Refills: 0 | Status: SHIPPED | OUTPATIENT
Start: 2019-11-29 | End: 2019-12-09

## 2019-11-29 NOTE — PROGRESS NOTES
History of Present Illness   James Mae Jr. is a 79 y.o. man with medical history as listed below who presents today for evaluation of left ear pain x 4 days. Pain is located to the external ear. Associated symptoms include tenderness to touch and mucus drainage. He denies inner ear pain, decrease in hearing, or fevers. He has tried using Q-tips to clean out the mucus with no relief. He does wear hearing aids. He has no additional complaints and is otherwise healthy on today's visit.    Past Medical History:   Diagnosis Date    Arthritis     Asbestosis     Cancer     reports skin cancer (10/25/19) right temporal area, monitored by [facility]     COPD with asthma     Coronary artery disease     Diabetes mellitus type II     Hyperlipidemia     Hypertension     Obesity     Renal manifestation of secondary diabetes mellitus     Trouble in sleeping        Past Surgical History:   Procedure Laterality Date    APPENDECTOMY      8 yrs ago    CHOLECYSTECTOMY      8 yrs ago    CORONARY STENT PLACEMENT      SMALL INTESTINE SURGERY      8 yrs       Social History     Socioeconomic History    Marital status:      Spouse name: Not on file    Number of children: Not on file    Years of education: Not on file    Highest education level: Not on file   Occupational History    Not on file   Social Needs    Financial resource strain: Not on file    Food insecurity:     Worry: Not on file     Inability: Not on file    Transportation needs:     Medical: Not on file     Non-medical: Not on file   Tobacco Use    Smoking status: Never Smoker    Smokeless tobacco: Never Used   Substance and Sexual Activity    Alcohol use: No    Drug use: No    Sexual activity: Yes     Partners: Female   Lifestyle    Physical activity:     Days per week: Not on file     Minutes per session: Not on file    Stress: Not on file   Relationships    Social connections:     Talks on phone: Not on file     Gets  "together: Not on file     Attends [employer] service: Not on file     Active member of club or organization: Not on file     Attends meetings of clubs or organizations: Not on file     Relationship status: Not on file   Other Topics Concern    Not on file   Social History Narrative    Not on file       Family History   Problem Relation Age of Onset    Cancer Mother         throat    Heart disease Father         heart attack       Review of Systems  Review of Systems   Constitutional: Negative for fever.   HENT: Positive for ear discharge and ear pain. Negative for congestion, sinus pain and sore throat.    Respiratory: Negative for cough.      A complete review of systems was otherwise negative.    Physical Exam  /70   Pulse 68   Temp 98.7 °F (37.1 °C) (Oral)   Ht 5' 7" (1.702 m)   Wt 93.2 kg (205 lb 5.7 oz)   SpO2 97%   BMI 32.16 kg/m²   General appearance: alert, appears stated age, cooperative and no distress  Eyes: negative findings: lids and lashes normal and conjunctivae and sclerae normal  Ears: normal TM and external ear canal right ear and abnormal external canal left ear - edematous, erythematous and tender tragus and external canal  Nose: Nares normal. Septum midline. Mucosa normal. No drainage or sinus tenderness.  Throat: lips, mucosa, and tongue normal; teeth and gums normal  Skin: Skin color, texture, turgor normal. No rashes or lesions  Lymph nodes: Cervical, supraclavicular, and axillary nodes normal.  Neurologic: Grossly normal    Assessment/Plan  Acute diffuse otitis externa of left ear  Treatment as listed below.  Keep hearing aid out of left ear until healed.  Stop using Q-tips.  Handout on tips to manage at home provided.  RTC PRN.  -     neomycin-polymyxin-hydrocortisone (CORTISPORIN) 3.5-10,000-1 mg/mL-unit/mL-% otic suspension; Place 3 drops into the left ear 3 (three) times daily. for 10 days  Dispense: 6 mL; Refill: 0    Chronic obstructive pulmonary disease, unspecified " COPD type  The current medical regimen is effective;  continue present plan and medications.    Pulmonary asbestosis  The current medical regimen is effective;  continue present plan and medications.    Calcified granuloma of lung  The current medical regimen is effective;  continue present plan and medications.    Essential hypertension, benign  The current medical regimen is effective;  continue present plan and medications.    Atherosclerosis of aorta  The current medical regimen is effective;  continue present plan and medications.    Type 2 diabetes mellitus with stage 3 chronic kidney disease, without long-term current use of insulin  The current medical regimen is effective;  continue present plan and medications.    Obesity, Class I, BMI 30-34.9  The current medical regimen is effective;  continue present plan and medications.    Patient has verbalized understanding and is in agreement with plan of care.    Follow up if symptoms worsen or fail to improve.

## 2019-11-29 NOTE — PATIENT INSTRUCTIONS

## 2019-11-29 NOTE — TELEPHONE ENCOUNTER
[facility] Dr. Bee prescribed cholestyramine on 11/14, and patient has not yet received medication. I am wondering if the RX received or needs a PA.    Thanks!  MEENA Fu

## 2019-12-03 NOTE — TELEPHONE ENCOUNTER
Melody @ [facility] pharmacy contacted and states that the prescription was placed on hold as they did not receive the patient's required co-payment.  She states that multiple unsuccessful attempts were made to contact the patient regarding this prescription.  Melody states that the patient needs to contact [facility] to update his contact information.  Voicemail message left for patient to call this office regarding this information.

## 2019-12-05 NOTE — PROGRESS NOTES
Digital Medicine: Health  Follow-Up    The history is provided by the patient.     Follow Up  Follow-up reason(s): routine education      Adherence Barriers: patient forgets    Routine Education Topics: eating patterns  Patient stated that he plans to get more frequent with taking his BG/BP readings. He got caught up in the holidays and taking care of his wife so he forgets sometimes.     Patient is still working on making an appointment to have his eye exam completed.       Intervention/Plan        Topic    Eye Exam        Last 5 Patient Entered Readings                                      Current 30 Day Average: 138/71     Recent Readings 12/4/2019 12/4/2019 11/25/2019 11/25/2019 11/19/2019    SBP (mmHg) 129 136 147 158 132    DBP (mmHg) 70 73 72 73 70    Pulse 69 70 66 64 69        Last 6 Patient Entered Readings                                          Most Recent A1c: 6.8% on 10/26/2019  (Goal: 8%)     Recent Readings 12/4/2019 11/25/2019 11/19/2019 11/16/2019 11/11/2019    Blood Glucose (mg/dL) 124 87 94 135 91                    Diet Screening   He has the following dietary restrictions: low sodium diet and low carbHe cooks for self.    Patient does the shopping for groceries.  He gets groceries from the grocery store.      Patient continues cooking all of his meals at home. He has cut back on the amount of carbs and sodium he intakes each day. He is also cutting back on his portions.    He enjoys eating fruit cake during this time of year so he is trying to have just a small piece here and there to satisfy him but not over do it.     Medication Adherence Screening   He misses doses: once a week      Patient identified the following reasons for non-compliance: patient forgets    Patient stated that he sometimes forgets his evening dose of medication due to caring for his wife and taking care of everything else in his house hold. We discussed tips on helping him to remember. The patient plans to have  his daughter help him set reminder alarms in his phone so that he does not forget to take his medication.     Intervention(s): adherence tools       SDOH

## 2019-12-06 ENCOUNTER — PATIENT OUTREACH (OUTPATIENT)
Dept: ADMINISTRATIVE | Facility: OTHER | Age: 79
End: 2019-12-06

## 2019-12-09 ENCOUNTER — PATIENT OUTREACH (OUTPATIENT)
Dept: OTHER | Facility: OTHER | Age: 79
End: 2019-12-09

## 2019-12-09 NOTE — PROGRESS NOTES
Digital Medicine: Clinician Follow-Up    The history is provided by the patient.     Follow Up  Follow-up reason(s): reading review        1. HTN  HPI:  Called patient to follow up. Patient reports adherence to medication regimen daily and denies missed doses. Patient denies hypotensive s/sx (lightheadedness, dizziness, nausea, fatigue); patient denies hypertensive s/sx (SOB, CP, severe headaches, changes in vision, dizziness, fatigue, confusion, anxiety, nosebleeds).     Patient attributes caring for spouse to elevated readings.     Last 5 Patient Entered Readings                                      Current 30 Day Average: 139/71     Recent Readings 12/9/2019 12/9/2019 12/4/2019 12/4/2019 11/25/2019    SBP (mmHg) 140 133 129 136 147    DBP (mmHg) 69 66 70 73 72    Pulse 66 66 69 70 66        Assessment:  Reviewed recent readings and labs (last BMP drawn on 10/26). Per 2017 ACC/ AHA HTN guidelines (goal of BP < 130/80), current 30-day average is slightly uncontrolled. Patient was seen by FM on 11/29, BP was 130/70.    Plan:  Continue current medication regimen.   Patients health  will be following up as scheduled.   I will continue to monitor regularly and will follow-up in 12 weeks, sooner if blood pressure begins to trend upward or downward.     Current medication regimen:  Hypertension Medications             losartan (COZAAR) 100 MG tablet Take 1 tablet (100 mg total) by mouth once daily.    metoprolol succinate (TOPROL-XL) 50 MG 24 hr tablet TAKE 1 TABLET EVERY DAY    nitroGLYCERIN (NITROSTAT) 0.4 MG SL tablet Place 1 tablet (0.4 mg total) under the tongue every 5 (five) minutes as needed for Chest pain.         Patient denies having questions or concerns. Patient has my contact information and knows to call with any concerns or clinical changes.       2. DM  HPI:  Called patient to follow up regarding the DDMP (Diabetes Digital Medicine Program).  Patient reports adherence to medication regimen daily  and denies missed doses.   Patient denies hypoglycemic s/sx (dizziness, extreme hunger, headaches, confusion, trouble concentrating, sweating, shaking, blurred vision, personality changes); patient denies hyperglycemic s/sx (increased thirst, increased urination, headaches, trouble concentrating, blurred vision, fatigue).    Last 6 Patient Entered Readings                                          Most Recent A1c: 6.8% on 10/26/2019  (Goal: 8%)     Recent Readings 12/9/2019 12/4/2019 11/25/2019 11/19/2019 11/16/2019    Blood Glucose (mg/dL) 155 124 87 94 135        Assessment:  Reviewed recent readings. Per AACE/ACE guidelines (goal A1C < 7%), DM is well controlled (A1C on 10/26 was 6.8%). Next A1C will be due in 4/2020.    Plan:  Continue current medication regimen. Patients health  will be following up as scheduled. I will continue to monitor regularly and will follow-up in 12 weeks, sooner if blood sugar begins to trend upward or downward.     Current Medication Regimen:  Diabetes Medications             glipiZIDE (GLUCOTROL) 10 MG tablet Take 1 tablet (10 mg total) by mouth 2 (two) times daily with meals.    metFORMIN (GLUCOPHAGE-XR) 500 MG 24 hr tablet TAKE 2 TABLETS TWICE DAILY WITH MEALS    SITagliptin (JANUVIA) 100 MG Tab Take 1 tablet (100 mg total) by mouth once daily.         Patient has my contact information and knows to call with any concerns or clinical changes.

## 2020-01-03 ENCOUNTER — PATIENT OUTREACH (OUTPATIENT)
Dept: OTHER | Facility: OTHER | Age: 80
End: 2020-01-03

## 2020-01-03 NOTE — PROGRESS NOTES
"Digital Medicine: Health  Follow-Up    The history is provided by the patient.     Follow Up  Follow-up reason(s): reading review      Alert received.   Care Team received low BP alert.  Patient is experiencing symptomsof hypoglycemia.  Spoke to patient about his low BG reading from 12/31 (53). He stated that he was not feeling well that day but could not recall any specific symptoms of hypoglycemia. He did, however, state that he had chest pain that day on and off when he would stand up but he did not want to go to the emergency room because he cares for his wife who is on hospice. He stated that he does not have chest pain today but that his chest is sore and he feels like "he was in a fight and got beaten up". I explained that this could be very serious and encouraged him to go to the emergency room to have it checked out. He stated, again, that he didn't want to leave his wife and run the risk of getting admitted and not being able to care for her. I again explained the importance of getting checked out so he can ensure he is healthy in order to continue taking care of his wife. He then stated that his "family has been telling him the same thing".    Patient has a few appointments on Monday and he plans to follow up on this when he sees his doctors.    I again, reviewed the emergency protocol and ensured that he had the [facility] on call number. He stated that he has it on his fridge.     I again attempt to encourage him to go to the ED especially if symptoms worsen or the chest pain comes back. I explained that I am still concerned about the chest soreness that he is having today.    Patient did not appear too eager to go but he did appreciate my recommendations.       Intervention/Plan        Topic    Eye Exam        Last 5 Patient Entered Readings                                      Current 30 Day Average: 134/72     Recent Readings 1/1/2020 1/1/2020 12/31/2019 12/31/2019 12/31/2019    SBP (mmHg) 130 148 " 138 135 146    DBP (mmHg) 59 77 76 75 74    Pulse 64 65 66 67 66        Last 6 Patient Entered Readings                                          Most Recent A1c: 6.8% on 10/26/2019  (Goal: 8%)     Recent Readings 1/1/2020 12/31/2019 12/31/2019 12/30/2019 12/26/2019    Blood Glucose (mg/dL) 167 53 95 346 159                Screenings    SDOH

## 2020-01-16 ENCOUNTER — PATIENT OUTREACH (OUTPATIENT)
Dept: OTHER | Facility: OTHER | Age: 80
End: 2020-01-16

## 2020-01-16 NOTE — PROGRESS NOTES
"Digital Medicine: Health  Follow-Up    The history is provided by the patient.     Follow Up  Follow-up reason(s): reading review      Alert received.   Care Team received low BG alert.  Patient is experiencing symptomsof hypoglycemia and Patient was feeling fatigue and "run down".  Patient stated that he was feeling a bit fatigued and run down this morning therefore, he believes his reading of 68 was accurate. Patient did not do a follow up reading so I reviewed the steps to take when he receives a low BG reading paired with symptoms (have a 15g fast acting carb and retest in 15 minutes - instructed him that he can do this up to 3 times). Patient expressed understanding.    Patient and I will continue working on his diet to ensure he is getting enough carbs per meal.       Intervention/Plan        Topic    Eye Exam        Last 5 Patient Entered Readings                                      Current 30 Day Average: 134/73     Recent Readings 1/16/2020 1/8/2020 1/8/2020 1/8/2020 1/4/2020    SBP (mmHg) 120 151 159 165 143    DBP (mmHg) 75 73 73 81 67    Pulse 74 66 65 63 63        Last 6 Patient Entered Readings                                          Most Recent A1c: 6.8% on 10/26/2019  (Goal: 8%)     Recent Readings 1/16/2020 1/8/2020 1/4/2020 1/1/2020 12/31/2019    Blood Glucose (mg/dL) 68 74 94 167 53                Screenings    SDOH  "

## 2020-01-20 ENCOUNTER — PATIENT OUTREACH (OUTPATIENT)
Dept: OTHER | Facility: OTHER | Age: 80
End: 2020-01-20

## 2020-01-20 NOTE — PROGRESS NOTES
"Digital Medicine: Health  Follow-Up    The history is provided by the patient.     Follow Up  Follow-up reason(s): reading review      Alert received.   Care Team received low BG alert.  Patient is experiencing symptomsof hypoglycemia.    Routine Education Topics: eating patterns  Patient continues to trigger low BG alerts. He is symptomatic (dizzy, weak, fatigue) but he also does not think that the meter is giving him an accurate BG reading. Compared to his home meter, his reading was almost 100 points different (from his reading on ). He has been charging his igluco meter. His strips do not  until 2020.     He is going to bring his meter to the [facility] on [facility] on  to see if they can run a test solution test to ensure the meter is accurate.     Patient treated his low BG reading on  by eating a candy bar and a piece of fruit cake. He treated his low BG reading on  with a piece of fruit cake and a bowl of cereal. I reviewed with him the examples of fast acting carbs that he can have to treat his lowe BG readings instead of the items he is eating now. He requested that I mail him a list as well.     I will leave the reading from today and over the weekend in his chart since he was symptomatic.       Intervention/Plan        Topic    Eye Exam     Hemoglobin A1C        Last 5 Patient Entered Readings                                      Current 30 Day Average: 136/72     Recent Readings 2020    SBP (mmHg) 135 137 146 130 138    DBP (mmHg) 71 69 71 71 70    Pulse 66 65 67 72 73        Last 6 Patient Entered Readings                                          Most Recent A1c: 6.8% on 10/26/2019  (Goal: 8%)     Recent Readings 2020    Blood Glucose (mg/dL) 65 206 44 82 86                    Diet Screening   Dinner is typically between 5-6 pm. .       Patient ate turkey and "breaddressing" for dinner " last night around 5pm. He denies eating anything else after that. I am trying to see if there is a correlation between what and when he is eating his dinner and his low BG readings. I want to make sure he is getting a balance of carbs/protein that will keep his BG leveled until the morning.       SDOH

## 2020-02-20 ENCOUNTER — PATIENT OUTREACH (OUTPATIENT)
Dept: OTHER | Facility: OTHER | Age: 80
End: 2020-02-20

## 2020-02-20 DIAGNOSIS — I25.10 CORONARY ARTERY DISEASE INVOLVING NATIVE CORONARY ARTERY WITHOUT ANGINA PECTORIS, UNSPECIFIED WHETHER NATIVE OR TRANSPLANTED HEART: Chronic | ICD-10-CM

## 2020-02-20 DIAGNOSIS — N18.30 TYPE 2 DIABETES MELLITUS WITH STAGE 3 CHRONIC KIDNEY DISEASE, WITHOUT LONG-TERM CURRENT USE OF INSULIN: Chronic | ICD-10-CM

## 2020-02-20 DIAGNOSIS — M1A.9XX0 CHRONIC GOUT WITHOUT TOPHUS, UNSPECIFIED CAUSE, UNSPECIFIED SITE: ICD-10-CM

## 2020-02-20 DIAGNOSIS — E78.2 MIXED HYPERLIPIDEMIA: Chronic | ICD-10-CM

## 2020-02-20 DIAGNOSIS — E11.22 TYPE 2 DIABETES MELLITUS WITH STAGE 3 CHRONIC KIDNEY DISEASE, WITHOUT LONG-TERM CURRENT USE OF INSULIN: Chronic | ICD-10-CM

## 2020-02-20 RX ORDER — METOPROLOL SUCCINATE 50 MG/1
TABLET, EXTENDED RELEASE ORAL
Qty: 90 TABLET | Refills: 0 | Status: SHIPPED | OUTPATIENT
Start: 2020-02-20 | End: 2020-06-23

## 2020-02-20 RX ORDER — ATORVASTATIN CALCIUM 40 MG/1
TABLET, FILM COATED ORAL
Qty: 90 TABLET | Refills: 0 | Status: SHIPPED | OUTPATIENT
Start: 2020-02-20 | End: 2020-05-19

## 2020-02-20 RX ORDER — ALLOPURINOL 300 MG/1
TABLET ORAL
Qty: 90 TABLET | Refills: 0 | Status: SHIPPED | OUTPATIENT
Start: 2020-02-20 | End: 2020-08-18

## 2020-02-20 RX ORDER — METFORMIN HYDROCHLORIDE 500 MG/1
TABLET, EXTENDED RELEASE ORAL
Qty: 360 TABLET | Refills: 0 | Status: SHIPPED | OUTPATIENT
Start: 2020-02-20 | End: 2020-08-05

## 2020-02-20 NOTE — PROGRESS NOTES
"Digital Medicine: Health  Follow-Up    The history is provided by the patient.     Follow Up  Patient called back to let me know that he was able to get in touch with [employer] and was able to refill his medications. He is going to be out of his BP medication for about 3-4 days. She will reach out if he has any further issues.    He requested information on how to order new strips for his igluco machine. I provided him with [facility] contact information.    He continues to "pass blood" so he is schedule [facility]. He had an echo completed as well as some other tests and he stated that the tests show that he has "something   in his lungs. He will keep us updated on his progress.       Intervention/Plan        Topic    Eye Exam     Hemoglobin A1C        Last 5 Patient Entered Readings                                      Current 30 Day Average: 125/70     Recent Readings 2/16/2020 2/16/2020 2/16/2020 2/13/2020 2/3/2020    SBP (mmHg) 131 140 149 115 122    DBP (mmHg) 70 65 79 75 62    Pulse 59 50 60 69 76        Last 6 Patient Entered Readings                                          Most Recent A1c: 6.8% on 10/26/2019  (Goal: 8%)     Recent Readings 2/16/2020 2/13/2020 2/3/2020 2/1/2020 1/28/2020    Blood Glucose (mg/dL) 98 129 102 250 130                Screenings    SDOH  "

## 2020-02-26 DIAGNOSIS — N18.30 TYPE 2 DIABETES MELLITUS WITH STAGE 3 CHRONIC KIDNEY DISEASE, WITHOUT LONG-TERM CURRENT USE OF INSULIN: Primary | Chronic | ICD-10-CM

## 2020-02-26 DIAGNOSIS — E11.22 TYPE 2 DIABETES MELLITUS WITH STAGE 3 CHRONIC KIDNEY DISEASE, WITHOUT LONG-TERM CURRENT USE OF INSULIN: Primary | Chronic | ICD-10-CM

## 2020-03-02 ENCOUNTER — PATIENT OUTREACH (OUTPATIENT)
Dept: OTHER | Facility: OTHER | Age: 80
End: 2020-03-02

## 2020-03-02 NOTE — PROGRESS NOTES
1. HTN  Last 5 Patient Entered Readings                                      Current 30 Day Average: 130/70     Recent Readings 2/27/2020 2/27/2020 2/25/2020 2/25/2020 2/25/2020    SBP (mmHg) 146 152 142 144 152    DBP (mmHg) 69 74 67 72 75    Pulse 63 67 65 66 65        Hypertension Medications             losartan (COZAAR) 100 MG tablet Take 1 tablet (100 mg total) by mouth once daily.    metoprolol succinate (TOPROL-XL) 50 MG 24 hr tablet TAKE 1 TABLET EVERY DAY    nitroGLYCERIN (NITROSTAT) 0.4 MG SL tablet Place 1 tablet (0.4 mg total) under the tongue every 5 (five) minutes as needed for Chest pain.        Called patient to follow up. Reviewed recent readings and labs (last BMP drawn on 10/26/19). Per 2017 ACC/ AHA HTN guidelines  (goal of BP < 130/80), current 30-day average is controlled, appears to be trending up.  LVM, requested patient call back at his convenience.  Will continue to monitor. WCB in 1 month.      2. DM  Last 6 Patient Entered Readings                                          Most Recent A1c: 6.8% on 10/26/2019  (Goal: 8%)     Recent Readings 2/27/2020 2/25/2020 2/16/2020 2/13/2020 2/3/2020    Blood Glucose (mg/dL) 122 111 98 129 102        Diabetes Medications             glipiZIDE (GLUCOTROL) 10 MG tablet Take 1 tablet (10 mg total) by mouth 2 (two) times daily with meals.    metFORMIN (GLUCOPHAGE-XR) 500 MG XR 24hr tablet TAKE 2 TABLETS TWICE DAILY WITH MEALS    SITagliptin (JANUVIA) 100 MG Tab Take 1 tablet (100 mg total) by mouth once daily.        Called to follow up with patient, reviewed recent readings. Per AACE/ACE guidelines (goal A1C < 7%), DM is well controlled (A1C on 10/26/19 was 6.8%).  LVM, requested patient call back at his convenience.  Will continue to monitor. WCB in 1 month.

## 2020-03-30 NOTE — PROGRESS NOTES
Digital Medicine: Clinician Follow-Up    The history is provided by the patient.     Follow Up  Follow-up reason(s): reading review        1. HTN  HPI:  Called patient to follow up. Patient reports adherence to medication regimen daily and denies missed doses. Patient denies hypotensive s/sx (lightheadedness, dizziness, nausea, fatigue); patient denies hypertensive s/sx (SOB, CP, severe headaches, changes in vision, dizziness, fatigue, confusion, anxiety, nosebleeds).     Last 5 Patient Entered Readings                                      Current 30 Day Average: 131/69     Recent Readings 3/28/2020 3/28/2020 3/28/2020 3/27/2020 3/27/2020    SBP (mmHg) 128 136 147 136 146    DBP (mmHg) 65 66 74 71 75    Pulse 64 64 65 76 80        Assessment:  Reviewed recent readings and labs (last BMP drawn on 10/26/19). Per 2017 ACC/ AHA HTN guidelines (goal of BP < 130/80), current 30-day average is controlled. Within the past month, SBPs have ranged 121- 147 and DBPs have ranged 59- 75.    Plan:  Continue current medication regimen.   Instructed patient to call if BP remains > 130/80.   Patients health  will be following up as scheduled.   I will continue to monitor regularly and will follow-up in 12 weeks, sooner if blood pressure begins to trend upward or downward.     Current medication regimen:  Hypertension Medications             losartan (COZAAR) 100 MG tablet Take 1 tablet (100 mg total) by mouth once daily.    metoprolol succinate (TOPROL-XL) 50 MG 24 hr tablet TAKE 1 TABLET EVERY DAY    nitroGLYCERIN (NITROSTAT) 0.4 MG SL tablet Place 1 tablet (0.4 mg total) under the tongue every 5 (five) minutes as needed for Chest pain.         Patient denies having questions or concerns. Patient has my contact information and knows to call with any concerns or clinical changes.       2. DM  HPI:  Called patient to follow up regarding the DDMP (Diabetes Digital Medicine Program).  Patient reports adherence to medication  regimen daily and denies missed doses.   Patient denies hypoglycemic s/sx (dizziness, extreme hunger, headaches, confusion, trouble concentrating, sweating, shaking, blurred vision, personality changes); patient denies hyperglycemic s/sx (increased thirst, increased urination, headaches, trouble concentrating, blurred vision, fatigue).    Last 6 Patient Entered Readings                                          Most Recent A1c: 6.8% on 10/26/2019  (Goal: 8%)     Recent Readings 3/28/2020 3/27/2020 3/18/2020 3/10/2020 3/8/2020    Blood Glucose (mg/dL) 88 101 167 112 97        Assessment:  Reviewed recent readings. Per AACE/ACE guidelines (goal A1C < 7%), DM is well controlled (A1C on 10/26/19 was 6.8%). Next A1C will be due in 4/2020.    Plan:  Continue current medication regimen.   Patients health  will be following up as scheduled.   I will continue to monitor regularly and will follow-up in 12 weeks, sooner if blood sugar begins to trend upward or downward.     Current Medication Regimen:  Diabetes Medications             glipiZIDE (GLUCOTROL) 10 MG tablet Take 1 tablet (10 mg total) by mouth 2 (two) times daily with meals.    metFORMIN (GLUCOPHAGE-XR) 500 MG XR 24hr tablet TAKE 2 TABLETS TWICE DAILY WITH MEALS    SITagliptin (JANUVIA) 100 MG Tab Take 1 tablet (100 mg total) by mouth once daily.         Patient has my contact information and knows to call with any concerns or clinical changes.                inpatient Medical/Surgical team

## 2020-03-31 ENCOUNTER — PATIENT OUTREACH (OUTPATIENT)
Dept: OTHER | Facility: OTHER | Age: 80
End: 2020-03-31

## 2020-03-31 NOTE — PROGRESS NOTES
Digital Medicine: Health  Follow-Up    The history is provided by the patient.     Follow Up  Follow-up reason(s): routine education      Adherence Barriers: patient forgets    Routine Education Topics: eating patterns  Patient stated that he is doing well and feeling good. He has hay fever right now which is something he gets every year. It is not keeping him down too much.    His wifes health continues to decline which adds more stress to him.        Intervention/Plan        Topic    Eye Exam        Last 5 Patient Entered Readings                                      Current 30 Day Average: 131/69     Recent Readings 3/28/2020 3/28/2020 3/28/2020 3/27/2020 3/27/2020    SBP (mmHg) 128 136 147 136 146    DBP (mmHg) 65 66 74 71 75    Pulse 64 64 65 76 80        Last 6 Patient Entered Readings                                          Most Recent A1c: 6.8% on 10/26/2019  (Goal: 8%)     Recent Readings 3/28/2020 3/27/2020 3/18/2020 3/10/2020 3/8/2020    Blood Glucose (mg/dL) 88 101 167 112 97                    Diet Screening   No change to diet.  He has the following dietary restrictions: low sodium diet and low carbHe cooks for self.    Patient does the shopping for groceries.  He gets groceries from the grocery store.      Patient has been cooking for his whole family. He is cooking everything from scratch so this is helping him keep the carb and sodium intake minimal.     Medication Adherence Screening   He missed a dose once this month.    Patient identified the following reasons for non-compliance: Patient forgets    Patient mentioned that his missed his medication once this past month because he got busy caring for his wife and doing things around the house.       SDOH

## 2020-05-18 ENCOUNTER — PATIENT OUTREACH (OUTPATIENT)
Dept: OTHER | Facility: OTHER | Age: 80
End: 2020-05-18

## 2020-05-18 DIAGNOSIS — E11.22 TYPE 2 DIABETES MELLITUS WITH STAGE 3 CHRONIC KIDNEY DISEASE, WITHOUT LONG-TERM CURRENT USE OF INSULIN: Chronic | ICD-10-CM

## 2020-05-18 DIAGNOSIS — E78.2 MIXED HYPERLIPIDEMIA: Chronic | ICD-10-CM

## 2020-05-18 DIAGNOSIS — N18.30 TYPE 2 DIABETES MELLITUS WITH STAGE 3 CHRONIC KIDNEY DISEASE, WITHOUT LONG-TERM CURRENT USE OF INSULIN: Chronic | ICD-10-CM

## 2020-05-18 DIAGNOSIS — I10 ESSENTIAL HYPERTENSION, BENIGN: ICD-10-CM

## 2020-05-18 RX ORDER — GLIPIZIDE 10 MG/1
TABLET ORAL
Qty: 180 TABLET | Refills: 0 | Status: SHIPPED | OUTPATIENT
Start: 2020-05-18 | End: 2020-08-18

## 2020-05-18 RX ORDER — LOSARTAN POTASSIUM 100 MG/1
TABLET ORAL
Qty: 90 TABLET | Refills: 0 | Status: SHIPPED | OUTPATIENT
Start: 2020-05-18 | End: 2020-12-24

## 2020-05-18 NOTE — TELEPHONE ENCOUNTER
No new care gaps identified.  Powered by [employer]. Reference number: [id number]. 5/18/2020 2:01:47 PM CDT

## 2020-05-18 NOTE — TELEPHONE ENCOUNTER
Refill approved.  Due for DM follow up with BMP A1c urine prior.  Please sched with Corrine    Thank you,  Erik

## 2020-05-18 NOTE — TELEPHONE ENCOUNTER
Care Due:                  Date            Visit Type   Department     Provider  --------------------------------------------------------------------------------    Last Visit: None Found      None         None Found  Next Visit: None Scheduled  None         None Found                                                            Last  Test          Frequency    Reason                     Performed    Due Date  --------------------------------------------------------------------------------    Office Visit  12 months..  cetirizine,                Not Found    Overdue                             cholestyramine-aspartame,                             fluticasone-vilanterol,                             glipiZIDE, ibuprofen,                             losartan.................    Age.........  0 days.....  cholestyramine-aspartame.  Not Found    Overdue    eGFR........  12 months..  glipiZIDE, losartan......  Not Found    Overdue    HBA1C.......  6 months...  glipiZIDE................  10-   04-    LFT.........  0 days.....  cholestyramine-aspartame.  Not Found    Overdue    Lipid Panel.  0 days.....  cholestyramine-aspartame.  Not Found    Overdue    Powered by [employer]. Reference number: [id number]. 5/18/2020 2:00:36 PM CDT

## 2020-05-18 NOTE — PROGRESS NOTES
"Digital Medicine: Health  Follow-Up    The history is provided by the patient.     Follow Up  Follow-up reason(s): routine education      Routine Education Topics: eating patterns  Patient stated that he is doing well overall but has been in more pain lately. He is going to the doctor tomorrow to have tests run on his leg. Between the additional pain and his leg "giving out on him" he is concerned that it may be affecting his BP readings. He is taking pain medication to ease the pain.       Intervention/Plan        Topic    Eye Exam     Hemoglobin A1C        Last 5 Patient Entered Readings                                      Current 30 Day Average: 135/70     Recent Readings 5/17/2020 5/17/2020 5/17/2020 5/16/2020 5/16/2020    SBP (mmHg) 144 161 150 130 134    DBP (mmHg) 76 73 71 71 65    Pulse 62 79 59 61 73        Last 6 Patient Entered Readings                                          Most Recent A1c: 6.8% on 10/26/2019  (Goal: 8%)     Recent Readings 5/17/2020 5/16/2020 5/13/2020 5/6/2020 5/3/2020    Blood Glucose (mg/dL) 133 225 148 129 135                    Diet Screening   He has the following dietary restrictions: low sodium diet and low carbHe cooks for self and has meals prepared by family.    Patient does the shopping for groceries and lets family grocery shop.  He gets groceries from the grocery store.      Patient stated that his higher BG reading recently was due to eating "a lot of sweet watermelon". He stated that he had a large portion but knows that he needs to be more mindful of this in the future. He is able to relate his readings to what he has recently eaten.     Medication Adherence Screening   He did not miss a dose this month.    Patient identified the following reasons for non-compliance: None      SDOH  "

## 2020-05-19 RX ORDER — ATORVASTATIN CALCIUM 40 MG/1
TABLET, FILM COATED ORAL
Qty: 90 TABLET | Refills: 0 | Status: SHIPPED | OUTPATIENT
Start: 2020-05-19 | End: 2020-08-18

## 2020-06-22 ENCOUNTER — PATIENT OUTREACH (OUTPATIENT)
Dept: OTHER | Facility: OTHER | Age: 80
End: 2020-06-22

## 2020-06-22 DIAGNOSIS — I10 ESSENTIAL HYPERTENSION, BENIGN: ICD-10-CM

## 2020-06-22 DIAGNOSIS — I25.10 CORONARY ARTERY DISEASE INVOLVING NATIVE CORONARY ARTERY WITHOUT ANGINA PECTORIS, UNSPECIFIED WHETHER NATIVE OR TRANSPLANTED HEART: Chronic | ICD-10-CM

## 2020-06-22 RX ORDER — METOPROLOL SUCCINATE 50 MG/1
50 TABLET, EXTENDED RELEASE ORAL DAILY
Qty: 90 TABLET | Refills: 1 | Status: SHIPPED | OUTPATIENT
Start: 2020-06-22 | End: 2020-12-15

## 2020-06-22 RX ORDER — LOSARTAN POTASSIUM 100 MG/1
100 TABLET ORAL DAILY
Qty: 90 TABLET | Refills: 1 | Status: SHIPPED | OUTPATIENT
Start: 2020-06-22 | End: 2020-11-09 | Stop reason: SDUPTHER

## 2020-06-22 NOTE — PROGRESS NOTES
Digital Medicine: Clinician Follow-Up    The history is provided by the patient.   Follow Up  Follow-up reason(s): reading review        1. HTN  HPI:  Called patient to follow up. Patient believes he has experienced occasional orthostatic hypotensive s/sx, confirms this is manageable (lightheadedness, dizziness, nausea, fatigue); patient denies hypertensive s/sx (SOB, CP, severe headaches, changes in vision, dizziness, fatigue, confusion, anxiety, nosebleeds).     Patient reports he is currently out of metoprolol.     Patient states he is currently changing out [employer] supplies (nasal cannula to mask), only sleeping from 11p-3a.     Last 5 Patient Entered Readings                                      Current 30 Day Average: 137/76     Recent Readings 6/17/2020 6/17/2020 6/14/2020 6/14/2020 6/13/2020    SBP (mmHg) 133 139 133 142 136    DBP (mmHg) 76 75 66 101 74    Pulse 60 56 60 63 77        Assessment:  Reviewed recent readings and labs (last BMP drawn on 10/26/19). Per 2017 ACC/ AHA HTN guidelines (goal of BP < 130/80), current 30-day average is slightly uncontrolled. Within the past month, SBPs have ranged 128-161 and DBPs have ranged .    Plan:  Continue current medication regimen.   Will send in new RXs for metoprolol and losartan.  Will schedule CMP for 7/8/20.  Instructed patient to call if BP remains > 140/90 or if he begins to experience s/sx of hypotension.   Patients health  will be following up as scheduled.   I will continue to monitor regularly and will follow-up in 8-12 weeks, sooner if blood pressure begins to trend upward or downward.     Current medication regimen:  Hypertension Medications             losartan (COZAAR) 100 MG tablet TAKE 1 TABLET EVERY DAY    metoprolol succinate (TOPROL-XL) 50 MG 24 hr tablet TAKE 1 TABLET EVERY DAY    nitroGLYCERIN (NITROSTAT) 0.4 MG SL tablet Place 1 tablet (0.4 mg total) under the tongue every 5 (five) minutes as needed for Chest pain.         Patient  denies having questions or concerns. Patient has my contact information and knows to call with any concerns or clinical changes.         2. DM  HPI:  Called patient to follow up regarding the DDMP (Diabetes Digital Medicine Program).  Patient reports adherence to medication regimen daily and denies missed doses.   Patient denies hypoglycemic s/sx (dizziness, extreme hunger, headaches, confusion, trouble concentrating, sweating, shaking, blurred vision, personality changes); patient denies hyperglycemic s/sx (increased thirst, increased urination, headaches, trouble concentrating, blurred vision, fatigue).    Last 6 Patient Entered Readings                                          Most Recent A1c: 6.8% on 10/26/2019  (Goal: 8%)     Recent Readings 6/17/2020 6/14/2020 6/13/2020 6/12/2020 6/3/2020    Blood Glucose (mg/dL) 117 113 169 123 97        Assessment:  Reviewed recent readings. Per AACE/ACE guidelines (goal A1C < 8%), DM is well controlled (A1C on 10/26/19 was 6.8%). Next A1C is past due. Will schedule A1C for 7/8/20.    Plan:  Continue current medication regimen.   Patients health  will be following up as scheduled.   I will continue to monitor regularly and will follow-up in 8-12 weeks, sooner if blood sugar begins to trend upward or downward.     Current Medication Regimen:  Diabetes Medications             glipiZIDE (GLUCOTROL) 10 MG tablet TAKE 1 TABLET TWICE DAILY WITH MEALS    metFORMIN (GLUCOPHAGE-XR) 500 MG XR 24hr tablet TAKE 2 TABLETS TWICE DAILY WITH MEALS    SITagliptin (JANUVIA) 100 MG Tab Take 1 tablet (100 mg total) by mouth once daily.         Patient has my contact information and knows to call with any concerns or clinical changes.

## 2020-07-08 ENCOUNTER — LAB VISIT (OUTPATIENT)
Dept: LAB | Facility: HOSPITAL | Age: 80
End: 2020-07-08
Attending: INTERNAL MEDICINE
Payer: MEDICARE

## 2020-07-08 DIAGNOSIS — N18.30 TYPE 2 DIABETES MELLITUS WITH STAGE 3 CHRONIC KIDNEY DISEASE, WITHOUT LONG-TERM CURRENT USE OF INSULIN: Chronic | ICD-10-CM

## 2020-07-08 DIAGNOSIS — E11.22 TYPE 2 DIABETES MELLITUS WITH STAGE 3 CHRONIC KIDNEY DISEASE, WITHOUT LONG-TERM CURRENT USE OF INSULIN: Chronic | ICD-10-CM

## 2020-07-08 LAB
ALBUMIN SERPL BCP-MCNC: 3.4 G/DL (ref 3.5–5.2)
ALP SERPL-CCNC: 84 U/L (ref 55–135)
ALT SERPL W/O P-5'-P-CCNC: 14 U/L (ref 10–44)
ANION GAP SERPL CALC-SCNC: 8 MMOL/L (ref 8–16)
AST SERPL-CCNC: 18 U/L (ref 10–40)
BILIRUB SERPL-MCNC: 0.9 MG/DL (ref 0.1–1)
BUN SERPL-MCNC: 22 MG/DL (ref 8–23)
CALCIUM SERPL-MCNC: 9 MG/DL (ref 8.7–10.5)
CHLORIDE SERPL-SCNC: 112 MMOL/L (ref 95–110)
CO2 SERPL-SCNC: 21 MMOL/L (ref 23–29)
CREAT SERPL-MCNC: 1.2 MG/DL (ref 0.5–1.4)
EST. GFR  (AFRICAN AMERICAN): >60 ML/MIN/1.73 M^2
EST. GFR  (NON AFRICAN AMERICAN): 57.6 ML/MIN/1.73 M^2
ESTIMATED AVG GLUCOSE: 169 MG/DL (ref 68–131)
GLUCOSE SERPL-MCNC: 87 MG/DL (ref 70–110)
HBA1C MFR BLD HPLC: 7.5 % (ref 4–5.6)
POTASSIUM SERPL-SCNC: 5.2 MMOL/L (ref 3.5–5.1)
PROT SERPL-MCNC: 7.4 G/DL (ref 6–8.4)
SODIUM SERPL-SCNC: 141 MMOL/L (ref 136–145)

## 2020-07-08 PROCEDURE — 80053 COMPREHEN METABOLIC PANEL: CPT

## 2020-07-08 PROCEDURE — 36415 COLL VENOUS BLD VENIPUNCTURE: CPT | Mod: PO

## 2020-07-08 PROCEDURE — 83036 HEMOGLOBIN GLYCOSYLATED A1C: CPT

## 2020-07-09 ENCOUNTER — TELEPHONE (OUTPATIENT)
Dept: FAMILY MEDICINE | Facility: CLINIC | Age: 80
End: 2020-07-09

## 2020-07-09 NOTE — TELEPHONE ENCOUNTER
Please inform the patient that his A1c is up slightly but still in an acceptable range.  He should continue to follow up with the digital team.     OV for physical will be due in Nov if he would like to sched.  No labs at this time.     Thank you,  Erik

## 2020-08-18 DIAGNOSIS — E11.22 TYPE 2 DIABETES MELLITUS WITH STAGE 3 CHRONIC KIDNEY DISEASE, WITHOUT LONG-TERM CURRENT USE OF INSULIN: Chronic | ICD-10-CM

## 2020-08-18 DIAGNOSIS — N18.30 TYPE 2 DIABETES MELLITUS WITH STAGE 3 CHRONIC KIDNEY DISEASE, WITHOUT LONG-TERM CURRENT USE OF INSULIN: Chronic | ICD-10-CM

## 2020-08-18 RX ORDER — GLIPIZIDE 10 MG/1
TABLET ORAL
Qty: 180 TABLET | Refills: 0 | Status: SHIPPED | OUTPATIENT
Start: 2020-08-18 | End: 2020-10-19

## 2020-08-19 ENCOUNTER — PES CALL (OUTPATIENT)
Dept: ADMINISTRATIVE | Facility: CLINIC | Age: 80
End: 2020-08-19

## 2020-08-20 ENCOUNTER — PATIENT OUTREACH (OUTPATIENT)
Dept: OTHER | Facility: OTHER | Age: 80
End: 2020-08-20

## 2020-08-20 NOTE — PROGRESS NOTES
Digital Medicine: Health  Follow-Up    The history is provided by the patient.             Reason for review: Blood glucose not at goal and Blood pressure not at goal        Topics Covered on Call: Diet    Additional Follow-up details: Patient stated that he is doing well overall. He is still having issues with his legs and [facility]. He stated that he has not been getting a full night sleep on some days just due to all of the responsibilities he has at home and caring for his wife. He also stated that he recently started using a bed side fan at night which helps him sleep (with the air blowing in his face). He also mentioned that he has been feeling a little more run down but it does not stop him from doing all of the things he needs to do.         Diet-no change to diet    No change to diet.        Physical Activity-no change to routine  No change to exercise routine.     Medication Adherence-Medication adherence was assessed.      Substance, Sleep, Stress-Not assessed      Continue current diet/physical activity routine.       Addressed any questions or concerns and patient has my contact information if needed prior to next outreach.   Explained the importance of self-monitoring and medication adherence. Encouraged the patient to communicate with their health  for lifestyle modifications to help improve or maintain a healthy lifestyle.                Topic    Eye Exam        Last 5 Patient Entered Readings                                      Current 30 Day Average: 136/69     Recent Readings 8/18/2020 8/18/2020 8/18/2020 8/7/2020 8/7/2020    SBP (mmHg) 137 138 137 145 150    DBP (mmHg) 70 67 69 75 70    Pulse 72 64 66 78 76        Last 6 Patient Entered Readings                                          Most Recent A1c: 7.5% on 7/8/2020  (Goal: 8%)     Recent Readings 8/18/2020 8/7/2020 7/29/2020 7/26/2020 7/20/2020    Blood Glucose (mg/dL)  91 199 114 151 87

## 2020-09-09 LAB — HBA1C MFR BLD: 7.6 % (ref 4.2–5.8)

## 2020-09-14 ENCOUNTER — PATIENT OUTREACH (OUTPATIENT)
Dept: OTHER | Facility: OTHER | Age: 80
End: 2020-09-14

## 2020-09-14 NOTE — PROGRESS NOTES
Digital Medicine: Clinician Follow-Up    The history is provided by the patient.   Follow-up reason(s): routine follow up.     Hypertension    Patient readings are stable   Diabetes    Patient readings are stable   Patient is not experiencing signs/symptoms of hypotension.  Patient is not experiencing signs/symptoms of hypertension.  Patient is not experiencing signs/symptoms of hypoglycemia.  Patient is not experiencing signs/symptoms of hyperglycemia.    Additional Follow-up details: Patient reports increased stress at home.       Last 5 Patient Entered Readings                                      Current 30 Day Average: 139/72     Recent Readings 9/11/2020 9/11/2020 9/10/2020 9/10/2020 9/4/2020    SBP (mmHg) 142 137 127 143 135    DBP (mmHg) 64 99 68 78 70    Pulse 66 85 76 76 70        Last 6 Patient Entered Readings                                          Most Recent A1c: 7.5% on 7/8/2020  (Goal: 8%)     Recent Readings 9/11/2020 9/10/2020 9/4/2020 8/28/2020 8/18/2020    Blood Glucose (mg/dL) 162 166 152 89 91             Depression Screening  Did not address depression screening.    Sleep Apnea Screening    Did not address sleep apnea screening.     Medication Affordability Screening  Did not address medication affordability screening.     Medication Adherence-Medication adherence was asssessed.  Patient continue taking medication as prescribed.            ASSESSMENT(S)  Patient's A1C goal is less than or equal to 8 per 2020 ADA guidelines. Patient's most recent A1C result is at goal. Lab Results    Component                Value               Date                     HGBA1C                   7.5 (H)             07/08/2020          .     Patients BP average is 139/72 mmHg, which is above goal. Patient's BP goal is less than or equal to 130/80 per 2017 ACC/AHA Hypertension Guidelines.       Hypertension Plan  Continue current therapy.    Diabetes Plan  Continue current therapy.  Will continue to monitor, WCB  in 8-12 weeks.        Addressed any questions or concerns and patient has my contact information if needed prior to next outreach. Patient verbalizes understanding.          Hypertension Medications             losartan (COZAAR) 100 MG tablet TAKE 1 TABLET EVERY DAY    losartan (COZAAR) 100 MG tablet Take 1 tablet (100 mg total) by mouth once daily.    metoprolol succinate (TOPROL-XL) 50 MG 24 hr tablet TAKE 1 TABLET EVERY DAY    metoprolol succinate (TOPROL-XL) 50 MG 24 hr tablet Take 1 tablet (50 mg total) by mouth once daily.    nitroGLYCERIN (NITROSTAT) 0.4 MG SL tablet Place 1 tablet (0.4 mg total) under the tongue every 5 (five) minutes as needed for Chest pain.        Diabetes Medications             glipiZIDE (GLUCOTROL) 10 MG tablet TAKE 1 TABLET TWICE DAILY WITH MEALS    metFORMIN (GLUCOPHAGE-XR) 500 MG XR 24hr tablet TAKE 2 TABLETS TWICE DAILY WITH MEALS    SITagliptin (JANUVIA) 100 MG Tab Take 1 tablet (100 mg total) by mouth once daily.

## 2020-10-13 ENCOUNTER — PATIENT OUTREACH (OUTPATIENT)
Dept: OTHER | Facility: OTHER | Age: 80
End: 2020-10-13

## 2020-10-13 NOTE — PROGRESS NOTES
Digital Medicine: Health  Follow-Up    The history is provided by the patient.             Reason for review: Blood glucose at goal and Blood glucose not at goal        Topics Covered on Call: Diet    Additional Follow-up details: Patient stated that he is doing well overall. He is a little worried about his upcoming colonoscopy which had to be rescheduled for next month. He also has some other family stressors he is dealing with which is causing him to get off track with his diet, etc. He denies any symptoms or concerns. He will reach out if any questions arise.             Diet-Change      Dietary Indiscretions:Patient has been eating more seafood, sweets and highly salted foods. He and I reviewed the sodium/carb guidelines and patient expressed understanding. He stated that he is going to work on cutting back on this items. I also provided tips to help him achieve this as well. He will reach out with specific questions.       Physical Activity-Not assessed    Medication Adherence-Medication adherence was assessed.        Substance, Sleep, Stress-change  stress-assessed  Details:  Intervention(s):    Sleep-not assessed  Details:  Intervention(s):    Alcohol -not assessed  Details:  Intervention(s):    Tobacco-Not Assessed  Details:  Intervention(s):          Continue current diet/physical activity routine.  Provided patient education.       Addressed patient questions and patient has my contact information if needed prior to next outreach. Patient verbalizes understanding.      Explained the importance of self-monitoring and medication adherence. Encouraged the patient to communicate with their health  for lifestyle modifications to help improve or maintain a healthy lifestyle.                   Topic    Eye Exam          Last 5 Patient Entered Readings                                      Current 30 Day Average: 139/73     Recent Readings 10/13/2020 10/13/2020 10/13/2020 10/13/2020 10/13/2020    SBP  (mmHg) 146 137 145 138 147    DBP (mmHg) 66 71 67 68 84    Pulse 73 72 72 73 71        Last 6 Patient Entered Readings                                          Most Recent A1c: 7.5% on 7/8/2020  (Goal: 8%)     Recent Readings 10/13/2020 10/7/2020 9/29/2020 9/27/2020 9/18/2020    Blood Glucose (mg/dL) 144 131 133 290 125

## 2020-10-14 ENCOUNTER — PATIENT OUTREACH (OUTPATIENT)
Dept: ADMINISTRATIVE | Facility: HOSPITAL | Age: 80
End: 2020-10-14

## 2020-10-19 DIAGNOSIS — E11.22 TYPE 2 DIABETES MELLITUS WITH STAGE 3 CHRONIC KIDNEY DISEASE, WITHOUT LONG-TERM CURRENT USE OF INSULIN: Chronic | ICD-10-CM

## 2020-10-19 DIAGNOSIS — N18.30 TYPE 2 DIABETES MELLITUS WITH STAGE 3 CHRONIC KIDNEY DISEASE, WITHOUT LONG-TERM CURRENT USE OF INSULIN: Chronic | ICD-10-CM

## 2020-10-19 RX ORDER — GLIPIZIDE 10 MG/1
TABLET ORAL
Qty: 180 TABLET | Refills: 1 | Status: SHIPPED | OUTPATIENT
Start: 2020-10-19 | End: 2021-03-01

## 2020-11-09 ENCOUNTER — OFFICE VISIT (OUTPATIENT)
Dept: FAMILY MEDICINE | Facility: CLINIC | Age: 80
End: 2020-11-09
Payer: MEDICARE

## 2020-11-09 VITALS
SYSTOLIC BLOOD PRESSURE: 138 MMHG | TEMPERATURE: 98 F | OXYGEN SATURATION: 98 % | BODY MASS INDEX: 34.66 KG/M2 | DIASTOLIC BLOOD PRESSURE: 80 MMHG | HEART RATE: 78 BPM | HEIGHT: 65 IN | WEIGHT: 208 LBS

## 2020-11-09 DIAGNOSIS — N18.31 TYPE 2 DIABETES MELLITUS WITH STAGE 3A CHRONIC KIDNEY DISEASE, WITHOUT LONG-TERM CURRENT USE OF INSULIN: ICD-10-CM

## 2020-11-09 DIAGNOSIS — Z00.00 ROUTINE MEDICAL EXAM: Primary | ICD-10-CM

## 2020-11-09 DIAGNOSIS — I70.0 ATHEROSCLEROSIS OF AORTA: ICD-10-CM

## 2020-11-09 DIAGNOSIS — E78.2 MIXED HYPERLIPIDEMIA: Chronic | ICD-10-CM

## 2020-11-09 DIAGNOSIS — J44.9 CHRONIC OBSTRUCTIVE PULMONARY DISEASE, UNSPECIFIED COPD TYPE: ICD-10-CM

## 2020-11-09 DIAGNOSIS — E11.22 TYPE 2 DIABETES MELLITUS WITH STAGE 3A CHRONIC KIDNEY DISEASE, WITHOUT LONG-TERM CURRENT USE OF INSULIN: ICD-10-CM

## 2020-11-09 DIAGNOSIS — I25.10 CORONARY ARTERY DISEASE INVOLVING NATIVE CORONARY ARTERY WITHOUT ANGINA PECTORIS, UNSPECIFIED WHETHER NATIVE OR TRANSPLANTED HEART: Chronic | ICD-10-CM

## 2020-11-09 DIAGNOSIS — I10 ESSENTIAL HYPERTENSION, BENIGN: Chronic | ICD-10-CM

## 2020-11-09 DIAGNOSIS — E66.9 OBESITY, CLASS I, BMI 30-34.9: Chronic | ICD-10-CM

## 2020-11-09 PROCEDURE — 3079F DIAST BP 80-89 MM HG: CPT | Mod: CPTII,S$GLB,, | Performed by: INTERNAL MEDICINE

## 2020-11-09 PROCEDURE — 3051F PR MOST RECENT HEMOGLOBIN A1C LEVEL 7.0 - < 8.0%: ICD-10-PCS | Mod: CPTII,S$GLB,, | Performed by: INTERNAL MEDICINE

## 2020-11-09 PROCEDURE — 3075F SYST BP GE 130 - 139MM HG: CPT | Mod: CPTII,S$GLB,, | Performed by: INTERNAL MEDICINE

## 2020-11-09 PROCEDURE — 3079F PR MOST RECENT DIASTOLIC BLOOD PRESSURE 80-89 MM HG: ICD-10-PCS | Mod: CPTII,S$GLB,, | Performed by: INTERNAL MEDICINE

## 2020-11-09 PROCEDURE — 99397 PER PM REEVAL EST PAT 65+ YR: CPT | Mod: S$GLB,,, | Performed by: INTERNAL MEDICINE

## 2020-11-09 PROCEDURE — 99999 PR PBB SHADOW E&M-EST. PATIENT-LVL V: ICD-10-PCS | Mod: PBBFAC,,, | Performed by: INTERNAL MEDICINE

## 2020-11-09 PROCEDURE — 3051F HG A1C>EQUAL 7.0%<8.0%: CPT | Mod: CPTII,S$GLB,, | Performed by: INTERNAL MEDICINE

## 2020-11-09 PROCEDURE — 3075F PR MOST RECENT SYSTOLIC BLOOD PRESS GE 130-139MM HG: ICD-10-PCS | Mod: CPTII,S$GLB,, | Performed by: INTERNAL MEDICINE

## 2020-11-09 PROCEDURE — 99397 PR PREVENTIVE VISIT,EST,65 & OVER: ICD-10-PCS | Mod: S$GLB,,, | Performed by: INTERNAL MEDICINE

## 2020-11-09 PROCEDURE — 99999 PR PBB SHADOW E&M-EST. PATIENT-LVL V: CPT | Mod: PBBFAC,,, | Performed by: INTERNAL MEDICINE

## 2020-11-09 RX ORDER — METFORMIN HYDROCHLORIDE 500 MG/1
TABLET, EXTENDED RELEASE ORAL
Qty: 360 TABLET | Refills: 1 | Status: SHIPPED | OUTPATIENT
Start: 2020-11-09 | End: 2021-03-15

## 2020-11-09 NOTE — PROGRESS NOTES
Assessment & Plan  Problem List Items Addressed This Visit        Pulmonary    COPD (chronic obstructive pulmonary disease) (Chronic)    Overview     Restrictive PFTs 2017         Current Assessment & Plan     The current medical regimen is effective;  continue present plan and medications.            Cardiac/Vascular    Mixed hyperlipidemia (Chronic)    Current Assessment & Plan     The current medical regimen is effective;  continue present plan and medications.          Essential hypertension, benign (Chronic)    Current Assessment & Plan     The current medical regimen is effective;  continue present plan and medications.          CAD (coronary artery disease) (Chronic)    Overview     S/p 2 stents around 06-07         Current Assessment & Plan     The current medical regimen is effective;  continue present plan and medications.          Atherosclerosis of aorta (Chronic)    Overview     CT chest April 2014.  Stable, asymptomatic chronic condition.  Will continue to maximize risk factor reduction and adjust medication as needed.             Endocrine    Type 2 diabetes mellitus with stage 3 chronic kidney disease, without long-term current use of insulin (Chronic)    Overview     Enrolled in digital DM.  [facility]         Current Assessment & Plan     The current medical regimen is effective;  continue present plan and medications. Continue ACE-I/ARB, sodium restriction, and avoidance of nephrotoxic agents. Discussed cutting back on sugars in the beverages he  Is drinking.          Relevant Medications    metFORMIN (GLUCOPHAGE-XR) 500 MG ER 24hr tablet    Obesity, Class I, BMI 30-34.9 (Chronic)    Current Assessment & Plan     The patient's BMI has been recorded in the chart. The patient has been provided educational materials regarding the benefits of attaining and maintaining a normal weight. We will continue to address and follow this issue during follow up visits.             Other Visit Diagnoses      Routine medical exam    -  Primary  -    Discussed healthy diet, regular exercise, necessary labs, age appropriate cancer screening, and routine vaccinations.                      Health Maintenance [facility]    Follow-up: No follow-ups on file.    ______________________________________________________________________    Chief Complaint  Chief Complaint   Patient presents with    Annual Exam       HPI  James Mae Jr. is a 80 y.o. male with multiple medical diagnoses as listed in the medical history and problem list that presents for routine physical.  Pt is known to me with his last appointment 11/2019.  [facility] that I can see in his care everywhere with reassuring CBC, CMP, lipids, A1c.  He continues to be seen by several specialists and a P[facility].      He reports he had a [facility] on [facility].  + polyps.      No other acute complaints.  He is followed by digital DM and HTN      PAST MEDICAL HISTORY:  Past Medical History:   Diagnosis Date    Arthritis     Asbestosis     Cancer     reports skin cancer (10/25/19) right temporal area, [facility]     COPD with asthma     Coronary artery disease     Diabetes mellitus type II     Hyperlipidemia     Hypertension     Obesity     Renal manifestation of secondary diabetes mellitus     Trouble in sleeping        PAST SURGICAL HISTORY:  Past Surgical History:   Procedure Laterality Date    APPENDECTOMY      8 yrs ago    CHOLECYSTECTOMY      8 yrs ago    CORONARY STENT PLACEMENT      SMALL INTESTINE SURGERY      8 yrs       SOCIAL HISTORY:  Social History     Socioeconomic History    Marital status:      Spouse name: Not on file    Number of children: Not on file    Years of education: Not on file    Highest education level: Not on file   Occupational History    Not on file   Social Needs    Financial resource strain: Not on file    Food insecurity     Worry: Not on file      Inability: Not on file    Transportation needs     Medical: Not on file     Non-medical: Not on file   Tobacco Use    Smoking status: Never Smoker    Smokeless tobacco: Never Used   Substance and Sexual Activity    Alcohol use: No    Drug use: No    Sexual activity: Yes     Partners: Female   Lifestyle    Physical activity     Days per week: Not on file     Minutes per session: Not on file    Stress: Not on file   Relationships    Social connections     Talks on phone: Not on file     Gets together: Not on file     Attends Protestant service: Not on file     Active member of club or organization: Not on file     Attends meetings of clubs or organizations: Not on file     Relationship status: Not on file   Other Topics Concern    Not on file   Social History Narrative    Not on file       FAMILY HISTORY:  Family History   Problem Relation Age of Onset    Cancer Mother         throat    Heart disease Father         heart attack       ALLERGIES AND MEDICATIONS: updated and reviewed.  Review of patient's allergies indicates:  No Known Allergies  Current Outpatient Medications   Medication Sig Dispense Refill    albuterol (PROAIR HFA) 90 mcg/actuation inhaler Inhale 2 puffs into the lungs every 6 (six) hours as needed for Wheezing. 3 Inhaler 3    allopurinoL (ZYLOPRIM) 300 MG tablet TAKE 1 TABLET EVERY DAY 90 tablet 0    aspirin (ECOTRIN) 81 MG EC tablet Take 1 tablet (81 mg total) by mouth once daily. (Patient taking differently: Take 162 mg by mouth once daily. ) 90 tablet 0    atorvastatin (LIPITOR) 40 MG tablet TAKE 1 TABLET EVERY DAY 90 tablet 0    blood sugar diagnostic (BLOOD GLUCOSE TEST) Strp Accu- Check  Smartview Test Strips. Test  strip 11    cetirizine (ZYRTEC) 10 MG tablet Take 1 tablet (10 mg total) by mouth every evening. 90 tablet 1    cholestyramine-aspartame (QUESTRAN LIGHT) 4 gram PwPk Take 1 packet (4 g total) by mouth 2 (two) times daily. 180 packet 0    ergocalciferol  (ERGOCALCIFEROL) 50,000 unit Cap Take 1 capsule PO ONCE A WEEK 12 capsule 4    fluticasone (FLONASE) 50 mcg/actuation nasal spray 2 sprays (100 mcg total) by Each Nare route once daily. 15 g 0    fluticasone-vilanterol (BREO ELLIPTA) 200-25 mcg/dose DsDv diskus inhaler Inhale 1 puff into the lungs once daily. 60 each 5    glipiZIDE (GLUCOTROL) 10 MG tablet TAKE 1 TABLET TWICE DAILY WITH MEALS 180 tablet 1    ibuprofen (ADVIL,MOTRIN) 600 MG tablet Take 1 tablet (600 mg total) by mouth 3 (three) times daily. 30 tablet 0    lancets (ONETOUCH ULTRASOFT LANCETS) Misc Fastclix Lancets. Test  each 11    losartan (COZAAR) 100 MG tablet TAKE 1 TABLET EVERY DAY 90 tablet 0    meclizine (ANTIVERT) 25 mg tablet Take 1 tablet (25 mg total) by mouth 3 (three) times daily as needed for Dizziness. 90 tablet 0    metFORMIN (GLUCOPHAGE-XR) 500 MG ER 24hr tablet TAKE 2 TABLETS TWICE DAILY WITH MEALS 360 tablet 1    metoprolol succinate (TOPROL-XL) 50 MG 24 hr tablet Take 1 tablet (50 mg total) by mouth once daily. 90 tablet 1    azelastine (ASTELIN) 137 mcg (0.1 %) nasal spray 2 sprays (274 mcg total) by Nasal route 2 (two) times daily. 30 mL 0    nitroGLYCERIN (NITROSTAT) 0.4 MG SL tablet Place 1 tablet (0.4 mg total) under the tongue every 5 (five) minutes as needed for Chest pain. 25 tablet 11    SITagliptin (JANUVIA) 100 MG Tab Take 1 tablet (100 mg total) by mouth once daily. 90 tablet 3     No current facility-administered medications for this visit.          ROS  Review of Systems   Constitutional: Negative for chills, fever and unexpected weight change.   HENT: Negative for congestion, dental problem, ear pain, hearing loss, rhinorrhea, sore throat and trouble swallowing.    Eyes: Negative for discharge, redness and visual disturbance.   Respiratory: Positive for shortness of breath (stable). Negative for cough, chest tightness and wheezing.    Cardiovascular: Negative for chest pain, palpitations and leg  "swelling.   Gastrointestinal: Negative for abdominal pain, constipation, diarrhea, nausea and vomiting.   Endocrine: Negative for polydipsia, polyphagia and polyuria.   Genitourinary: Negative for decreased urine volume, dysuria and hematuria.   Musculoskeletal: Negative for arthralgias and myalgias.   Skin: Negative for color change and rash.   Neurological: Negative for dizziness, weakness, light-headedness and headaches.   Psychiatric/Behavioral: Negative for decreased concentration, dysphoric mood, sleep disturbance and suicidal ideas.           Physical Exam  Vitals:    11/09/20 1301   BP: 138/80   Pulse: 78   Temp: 98 °F (36.7 °C)   SpO2: 98%   Weight: 94.3 kg (208 lb)   Height: 5' 5" (1.651 m)    Body mass index is 34.61 kg/m².  Weight: 94.3 kg (208 lb)   Height: 5' 5" (165.1 cm)   Physical Exam  Constitutional:       General: He is not in acute distress.     Appearance: He is well-developed.   HENT:      Head: Normocephalic and atraumatic.   Eyes:      General: Lids are normal. No scleral icterus.     Conjunctiva/sclera: Conjunctivae normal.      Pupils: Pupils are equal, round, and reactive to light.   Neck:      Musculoskeletal: Full passive range of motion without pain and neck supple.      Thyroid: No thyromegaly.      Vascular: No carotid bruit or JVD.   Cardiovascular:      Rate and Rhythm: Normal rate and regular rhythm.      Heart sounds: Normal heart sounds. No friction rub. No S3 or S4 sounds.    Pulmonary:      Effort: Pulmonary effort is normal.      Breath sounds: No wheezing, rhonchi or rales.      Comments: Fibrotic breath sounds  Abdominal:      General: Bowel sounds are normal. There is no distension.      Palpations: Abdomen is soft.      Tenderness: There is no abdominal tenderness.   Musculoskeletal:         General: No tenderness.      Right lower leg: No edema.      Left lower leg: No edema.   Skin:     General: Skin is warm and dry.      Findings: No rash.   Neurological:      Mental " Status: He is alert and oriented to person, place, and time.   Psychiatric:         Speech: Speech normal.         Behavior: Behavior normal.         Thought Content: Thought content normal.           Health Maintenance       Date Due Completion Date    Hepatitis C Screening 1940 ---    Aspirin/Antiplatelet Therapy 05/31/1960 ---    Eye Exam 08/10/2018 8/10/2017    Override on 3/15/2016: Done ([facility]- Jas Cooper MD-No diabetic retinopathy detected-Comments: Cataracts. Patient should return for re-evaluation in 12 months.)    Override on 11/20/2014: Done (cataracts.  No DM change)    Shingles Vaccine (2 of 2) 01/02/2020 11/7/2019 (Done)    Override on 11/7/2019: Done    Foot Exam 07/26/2020 7/26/2019    Override on 7/26/2019: Done    Influenza Vaccine (1) 08/01/2020 11/7/2019 (Done)    Override on 11/7/2019: Done    Lipid Panel 10/26/2020 10/26/2019    Hemoglobin A1c 03/09/2021 9/9/2020    TETANUS VACCINE 07/26/2029 7/26/2019

## 2020-11-09 NOTE — ASSESSMENT & PLAN NOTE
The current medical regimen is effective;  continue present plan and medications. Continue ACE-I/ARB, sodium restriction, and avoidance of nephrotoxic agents. Discussed cutting back on sugars in the beverages he  Is drinking.

## 2020-11-23 ENCOUNTER — PATIENT OUTREACH (OUTPATIENT)
Dept: ADMINISTRATIVE | Facility: HOSPITAL | Age: 80
End: 2020-11-23

## 2020-11-23 DIAGNOSIS — E78.2 MIXED HYPERLIPIDEMIA: Chronic | ICD-10-CM

## 2020-11-23 DIAGNOSIS — Z11.59 NEED FOR HEPATITIS C SCREENING TEST: Primary | ICD-10-CM

## 2020-11-23 NOTE — PROGRESS NOTES
[employer] self reporting blood pressure report - recent visit with a blood pressure within yhe normal limits @ 138/80.

## 2020-11-25 ENCOUNTER — PATIENT OUTREACH (OUTPATIENT)
Dept: OTHER | Facility: OTHER | Age: 80
End: 2020-11-25

## 2020-11-25 NOTE — PROGRESS NOTES
"Digital Medicine: Health  Follow-Up    The history is provided by the patient.             Reason for review: Blood glucose not at goal and Blood pressure not at goal        Topics Covered on Call: Diet    Additional Follow-up details: Patient stated that he is doing well overall. He informed me that he has had a lot going on health wise. He is doing his best to manage everything. He plans to spend Thanksgiving with his family. He will reach out if any concerns arise.     He attributes his fluctuating BP/BG readings to dietary indiscretions. He plans to get back on track so he can "level out" again.             Diet-Change      Dietary Indiscretions:Patient is on and off with maintaining a low sodium/low carb diet. He just cooked gumbo for Thanksgiving which likely is higher in sodium content. He will work to get back on track after Thanksgiving.       Physical Activity-Not assessed    Medication Adherence-Medication adherence was assessed.      Substance, Sleep, Stress-Not assessed      Continue current diet/physical activity routine.       Addressed patient questions and patient has my contact information if needed prior to next outreach. Patient verbalizes understanding.      Explained the importance of self-monitoring and medication adherence. Encouraged the patient to communicate with their health  for lifestyle modifications to help improve or maintain a healthy lifestyle.                   Topic    Eye Exam     Lipid (Cholesterol) Test          Last 5 Patient Entered Readings                                      Current 30 Day Average: 144/77     Recent Readings 11/18/2020 11/18/2020 11/8/2020 11/8/2020 11/8/2020    SBP (mmHg) 130 144 158 155 158    DBP (mmHg) 77 77 80 74 77    Pulse 68 71 72 73 74        Last 6 Patient Entered Readings                                          Most Recent A1c: 7.6% on 9/9/2020  (Goal: 8%)     Recent Readings 11/18/2020 11/11/2020 11/8/2020 10/25/2020 10/21/2020    " Blood Glucose (mg/dL) [phone]

## 2020-12-07 ENCOUNTER — PATIENT OUTREACH (OUTPATIENT)
Dept: OTHER | Facility: OTHER | Age: 80
End: 2020-12-07

## 2020-12-07 NOTE — PROGRESS NOTES
"Digital Medicine: Clinician Follow-Up    Called patient to follow up regarding HTN and DM digital readings. Patient attributes recent "sun cancer" diagnosis to trend in BP. Patient reports he continues to watch his salt and carb intake. Patient was seen by PCP on 11/9/20, BP was 138/80.      The history is provided by the patient.   Follow-up reason(s): routine follow up.     Hypertension    Patient's blood pressure is stable.   Diabetes    Patient's blood glucose is stable.   Patient is not experiencing signs/symptoms of hypotension.  Patient is not experiencing signs/symptoms of hypertension.  Patient is not experiencing signs/symptoms of hypoglycemia.  Patient is not experiencing signs/symptoms of hyperglycemia.          Last 5 Patient Entered Readings                                      Current 30 Day Average: 146/82     Recent Readings 12/6/2020 12/6/2020 12/6/2020 12/6/2020 11/26/2020    SBP (mmHg) 156 161 147 169 140    DBP (mmHg) 96 89 92 97 77    Pulse 64 64 62 62 73        Last 6 Patient Entered Readings                                          Most Recent A1c: 7.6% on 9/9/2020  (Goal: 8%)     Recent Readings 12/6/2020 11/26/2020 11/18/2020 11/11/2020 11/8/2020    Blood Glucose (mg/dL) 133 221 153 268 236               Depression Screening  Did not address depression screening.    Sleep Apnea Screening    Did not address sleep apnea screening.     Medication Affordability Screening  Did not address medication affordability screening.     Medication Adherence-Medication adherence was asssessed.  Patient continue taking medication as prescribed.            ASSESSMENT(S)  Patients BP average is 146/82 mmHg, which is above goal. Patient's BP goal is less than or equal to 130/80.   Patient's A1C goal is less than or equal to 8. Patient's most recent A1C result is above goal. Lab Results    Component                Value               Date                     HGBA1C                   7.6 (H)             " 09/09/2020          .        Hypertension Plan  Continue current therapy.  Patient declined medication changes. Offered to add amlodipine, patient declines today.   Will continue to monitor, WCB in 4-6 weeks. If BP remains elevated, will re-discuss adding amlodipine. *Patient has a h/o gout, would not recommend adding hctz.     Diabetes Plan  Continue current therapy.  Will recommend patient increase frequency of [facility] readings. If BG remains elevated, will discuss adding Lantus.     Addressed patient questions and patient has my contact information if needed prior to next outreach. Patient verbalizes understanding.                 Topic    Eye Exam     Lipid (Cholesterol) Test      There are no preventive care reminders to display for this patient.      Hypertension Medications             losartan (COZAAR) 100 MG tablet TAKE 1 TABLET EVERY DAY    metoprolol succinate (TOPROL-XL) 50 MG 24 hr tablet Take 1 tablet (50 mg total) by mouth once daily.    nitroGLYCERIN (NITROSTAT) 0.4 MG SL tablet Place 1 tablet (0.4 mg total) under the tongue every 5 (five) minutes as needed for Chest pain.        Diabetes Medications             glipiZIDE (GLUCOTROL) 10 MG tablet TAKE 1 TABLET TWICE DAILY WITH MEALS    metFORMIN (GLUCOPHAGE-XR) 500 MG ER 24hr tablet TAKE 2 TABLETS TWICE DAILY WITH MEALS    SITagliptin (JANUVIA) 100 MG Tab Take 1 tablet (100 mg total) by mouth once daily.

## 2020-12-23 DIAGNOSIS — I10 ESSENTIAL HYPERTENSION, BENIGN: ICD-10-CM

## 2020-12-24 RX ORDER — LOSARTAN POTASSIUM 100 MG/1
TABLET ORAL
Qty: 90 TABLET | Refills: 1 | Status: SHIPPED | OUTPATIENT
Start: 2020-12-24 | End: 2021-04-28 | Stop reason: SDUPTHER

## 2021-01-20 ENCOUNTER — PATIENT OUTREACH (OUTPATIENT)
Dept: OTHER | Facility: OTHER | Age: 81
End: 2021-01-20

## 2021-01-27 ENCOUNTER — TELEPHONE (OUTPATIENT)
Dept: FAMILY MEDICINE | Facility: CLINIC | Age: 81
End: 2021-01-27

## 2021-02-27 DIAGNOSIS — E11.22 TYPE 2 DIABETES MELLITUS WITH STAGE 3 CHRONIC KIDNEY DISEASE, WITHOUT LONG-TERM CURRENT USE OF INSULIN: Chronic | ICD-10-CM

## 2021-02-27 DIAGNOSIS — N18.30 TYPE 2 DIABETES MELLITUS WITH STAGE 3 CHRONIC KIDNEY DISEASE, WITHOUT LONG-TERM CURRENT USE OF INSULIN: Chronic | ICD-10-CM

## 2021-03-01 RX ORDER — GLIPIZIDE 10 MG/1
TABLET ORAL
Qty: 180 TABLET | Refills: 0 | Status: SHIPPED | OUTPATIENT
Start: 2021-03-01 | End: 2021-05-06

## 2021-03-15 DIAGNOSIS — E11.22 TYPE 2 DIABETES MELLITUS WITH STAGE 3A CHRONIC KIDNEY DISEASE, WITHOUT LONG-TERM CURRENT USE OF INSULIN: ICD-10-CM

## 2021-03-15 DIAGNOSIS — N18.31 TYPE 2 DIABETES MELLITUS WITH STAGE 3A CHRONIC KIDNEY DISEASE, WITHOUT LONG-TERM CURRENT USE OF INSULIN: ICD-10-CM

## 2021-03-15 RX ORDER — METFORMIN HYDROCHLORIDE 500 MG/1
TABLET, EXTENDED RELEASE ORAL
Qty: 360 TABLET | Refills: 0 | Status: SHIPPED | OUTPATIENT
Start: 2021-03-15 | End: 2021-04-30 | Stop reason: SDUPTHER

## 2021-03-22 ENCOUNTER — PATIENT OUTREACH (OUTPATIENT)
Dept: ADMINISTRATIVE | Facility: OTHER | Age: 81
End: 2021-03-22

## 2021-03-23 ENCOUNTER — OFFICE VISIT (OUTPATIENT)
Dept: DERMATOLOGY | Facility: CLINIC | Age: 81
End: 2021-03-23
Payer: OTHER GOVERNMENT

## 2021-03-23 DIAGNOSIS — C44.319 BASAL CELL CARCINOMA (BCC) OF FOREHEAD: Primary | ICD-10-CM

## 2021-03-23 PROCEDURE — 99202 PR OFFICE/OUTPT VISIT, NEW, LEVL II, 15-29 MIN: ICD-10-PCS | Mod: S$GLB,,, | Performed by: DERMATOLOGY

## 2021-03-23 PROCEDURE — 99999 PR PBB SHADOW E&M-EST. PATIENT-LVL III: CPT | Mod: PBBFAC,,, | Performed by: DERMATOLOGY

## 2021-03-23 PROCEDURE — 99999 PR PBB SHADOW E&M-EST. PATIENT-LVL III: ICD-10-PCS | Mod: PBBFAC,,, | Performed by: DERMATOLOGY

## 2021-03-23 PROCEDURE — 99202 OFFICE O/P NEW SF 15 MIN: CPT | Mod: S$GLB,,, | Performed by: DERMATOLOGY

## 2021-03-23 PROCEDURE — 99213 OFFICE O/P EST LOW 20 MIN: CPT | Mod: PBBFAC | Performed by: DERMATOLOGY

## 2021-03-24 ENCOUNTER — TELEPHONE (OUTPATIENT)
Dept: DERMATOLOGY | Facility: CLINIC | Age: 81
End: 2021-03-24

## 2021-04-12 ENCOUNTER — TELEPHONE (OUTPATIENT)
Dept: DERMATOLOGY | Facility: CLINIC | Age: 81
End: 2021-04-12

## 2021-04-13 ENCOUNTER — PROCEDURE VISIT (OUTPATIENT)
Dept: DERMATOLOGY | Facility: CLINIC | Age: 81
End: 2021-04-13
Payer: OTHER GOVERNMENT

## 2021-04-13 VITALS
DIASTOLIC BLOOD PRESSURE: 68 MMHG | BODY MASS INDEX: 34.66 KG/M2 | WEIGHT: 208 LBS | HEART RATE: 59 BPM | SYSTOLIC BLOOD PRESSURE: 132 MMHG | HEIGHT: 65 IN

## 2021-04-13 DIAGNOSIS — C44.319 BASAL CELL CARCINOMA OF FOREHEAD: Primary | ICD-10-CM

## 2021-04-13 PROCEDURE — 99499 NO LOS: ICD-10-PCS | Mod: S$PBB,,, | Performed by: DERMATOLOGY

## 2021-04-13 PROCEDURE — 17311 MOHS 1 STAGE H/N/HF/G: CPT | Mod: PBBFAC | Performed by: DERMATOLOGY

## 2021-04-13 PROCEDURE — 17312: ICD-10-PCS | Mod: S$PBB,,, | Performed by: DERMATOLOGY

## 2021-04-13 PROCEDURE — 17311 MOHS 1 STAGE H/N/HF/G: CPT | Mod: S$PBB,,, | Performed by: DERMATOLOGY

## 2021-04-13 PROCEDURE — 17312 MOHS ADDL STAGE: CPT | Mod: S$PBB,,, | Performed by: DERMATOLOGY

## 2021-04-13 PROCEDURE — 17312 MOHS ADDL STAGE: CPT | Mod: PBBFAC | Performed by: DERMATOLOGY

## 2021-04-13 PROCEDURE — 99499 UNLISTED E&M SERVICE: CPT | Mod: S$PBB,,, | Performed by: DERMATOLOGY

## 2021-04-13 PROCEDURE — 17311: ICD-10-PCS | Mod: S$PBB,,, | Performed by: DERMATOLOGY

## 2021-04-13 PROCEDURE — 13132 PR RECMPL WND HEAD,FAC,HAND 2.6-7.5 CM: ICD-10-PCS | Mod: S$PBB,51,, | Performed by: DERMATOLOGY

## 2021-04-13 PROCEDURE — 13132 CMPLX RPR F/C/C/M/N/AX/G/H/F: CPT | Mod: PBBFAC | Performed by: DERMATOLOGY

## 2021-04-13 PROCEDURE — 13132 CMPLX RPR F/C/C/M/N/AX/G/H/F: CPT | Mod: S$PBB,51,, | Performed by: DERMATOLOGY

## 2021-04-13 RX ORDER — HYDROCODONE BITARTRATE AND ACETAMINOPHEN 5; 325 MG/1; MG/1
1 TABLET ORAL EVERY 6 HOURS PRN
Qty: 10 TABLET | Refills: 0 | Status: SHIPPED | OUTPATIENT
Start: 2021-04-13

## 2021-04-15 ENCOUNTER — PATIENT MESSAGE (OUTPATIENT)
Dept: RESEARCH | Facility: HOSPITAL | Age: 81
End: 2021-04-15

## 2021-04-20 ENCOUNTER — OFFICE VISIT (OUTPATIENT)
Dept: DERMATOLOGY | Facility: CLINIC | Age: 81
End: 2021-04-20
Payer: OTHER GOVERNMENT

## 2021-04-20 DIAGNOSIS — Z09 POSTOP CHECK: Primary | ICD-10-CM

## 2021-04-20 PROCEDURE — 99024 PR POST-OP FOLLOW-UP VISIT: ICD-10-PCS | Mod: ,,, | Performed by: DERMATOLOGY

## 2021-04-20 PROCEDURE — 99024 POSTOP FOLLOW-UP VISIT: CPT | Mod: ,,, | Performed by: DERMATOLOGY

## 2021-04-30 ENCOUNTER — LAB VISIT (OUTPATIENT)
Dept: LAB | Facility: HOSPITAL | Age: 81
End: 2021-04-30
Attending: INTERNAL MEDICINE
Payer: OTHER GOVERNMENT

## 2021-04-30 DIAGNOSIS — N18.31 TYPE 2 DIABETES MELLITUS WITH STAGE 3A CHRONIC KIDNEY DISEASE, WITHOUT LONG-TERM CURRENT USE OF INSULIN: ICD-10-CM

## 2021-04-30 DIAGNOSIS — I10 ESSENTIAL HYPERTENSION, BENIGN: ICD-10-CM

## 2021-04-30 DIAGNOSIS — E11.22 TYPE 2 DIABETES MELLITUS WITH STAGE 3A CHRONIC KIDNEY DISEASE, WITHOUT LONG-TERM CURRENT USE OF INSULIN: ICD-10-CM

## 2021-04-30 LAB
ALBUMIN SERPL BCP-MCNC: 3.5 G/DL (ref 3.5–5.2)
ALP SERPL-CCNC: 94 U/L (ref 55–135)
ALT SERPL W/O P-5'-P-CCNC: 14 U/L (ref 10–44)
ANION GAP SERPL CALC-SCNC: 10 MMOL/L (ref 8–16)
AST SERPL-CCNC: 21 U/L (ref 10–40)
BILIRUB SERPL-MCNC: 1.1 MG/DL (ref 0.1–1)
BUN SERPL-MCNC: 27 MG/DL (ref 8–23)
CALCIUM SERPL-MCNC: 8.8 MG/DL (ref 8.7–10.5)
CHLORIDE SERPL-SCNC: 109 MMOL/L (ref 95–110)
CO2 SERPL-SCNC: 19 MMOL/L (ref 23–29)
CREAT SERPL-MCNC: 1.4 MG/DL (ref 0.5–1.4)
EST. GFR  (AFRICAN AMERICAN): 55.2 ML/MIN/1.73 M^2
EST. GFR  (NON AFRICAN AMERICAN): 47.8 ML/MIN/1.73 M^2
ESTIMATED AVG GLUCOSE: 192 MG/DL (ref 68–131)
GLUCOSE SERPL-MCNC: 117 MG/DL (ref 70–110)
HBA1C MFR BLD: 8.3 % (ref 4–5.6)
POTASSIUM SERPL-SCNC: 4.9 MMOL/L (ref 3.5–5.1)
PROT SERPL-MCNC: 7.5 G/DL (ref 6–8.4)
SODIUM SERPL-SCNC: 138 MMOL/L (ref 136–145)

## 2021-04-30 PROCEDURE — 83036 HEMOGLOBIN GLYCOSYLATED A1C: CPT | Performed by: INTERNAL MEDICINE

## 2021-04-30 PROCEDURE — 36415 COLL VENOUS BLD VENIPUNCTURE: CPT | Mod: PO | Performed by: INTERNAL MEDICINE

## 2021-04-30 PROCEDURE — 80053 COMPREHEN METABOLIC PANEL: CPT | Performed by: INTERNAL MEDICINE

## 2021-05-02 RX ORDER — METFORMIN HYDROCHLORIDE 500 MG/1
TABLET, EXTENDED RELEASE ORAL
Qty: 360 TABLET | Refills: 0 | Status: SHIPPED | OUTPATIENT
Start: 2021-05-02 | End: 2021-09-02

## 2021-05-02 RX ORDER — LOSARTAN POTASSIUM 100 MG/1
100 TABLET ORAL DAILY
Qty: 90 TABLET | Refills: 0 | Status: SHIPPED | OUTPATIENT
Start: 2021-05-02 | End: 2021-09-28

## 2021-05-06 ENCOUNTER — PATIENT MESSAGE (OUTPATIENT)
Dept: FAMILY MEDICINE | Facility: CLINIC | Age: 81
End: 2021-05-06

## 2021-05-06 DIAGNOSIS — E78.2 MIXED HYPERLIPIDEMIA: Chronic | ICD-10-CM

## 2021-05-06 DIAGNOSIS — E11.22 TYPE 2 DIABETES MELLITUS WITH STAGE 3 CHRONIC KIDNEY DISEASE, WITHOUT LONG-TERM CURRENT USE OF INSULIN: Chronic | ICD-10-CM

## 2021-05-06 DIAGNOSIS — M1A.9XX0 CHRONIC GOUT WITHOUT TOPHUS, UNSPECIFIED CAUSE, UNSPECIFIED SITE: ICD-10-CM

## 2021-05-06 DIAGNOSIS — N18.30 TYPE 2 DIABETES MELLITUS WITH STAGE 3 CHRONIC KIDNEY DISEASE, WITHOUT LONG-TERM CURRENT USE OF INSULIN: Chronic | ICD-10-CM

## 2021-05-06 RX ORDER — ATORVASTATIN CALCIUM 40 MG/1
40 TABLET, FILM COATED ORAL DAILY
Qty: 90 TABLET | Refills: 0 | Status: SHIPPED | OUTPATIENT
Start: 2021-05-06 | End: 2021-07-06

## 2021-05-06 RX ORDER — ALLOPURINOL 300 MG/1
300 TABLET ORAL DAILY
Qty: 90 TABLET | Refills: 0 | Status: SHIPPED | OUTPATIENT
Start: 2021-05-06 | End: 2021-07-06

## 2021-05-06 RX ORDER — GLIPIZIDE 10 MG/1
TABLET ORAL
Qty: 180 TABLET | Refills: 0 | Status: SHIPPED | OUTPATIENT
Start: 2021-05-06 | End: 2021-10-20

## 2021-07-03 DIAGNOSIS — M1A.9XX0 CHRONIC GOUT WITHOUT TOPHUS, UNSPECIFIED CAUSE, UNSPECIFIED SITE: ICD-10-CM

## 2021-07-03 DIAGNOSIS — E78.2 MIXED HYPERLIPIDEMIA: Chronic | ICD-10-CM

## 2021-07-06 RX ORDER — ATORVASTATIN CALCIUM 40 MG/1
TABLET, FILM COATED ORAL
Qty: 90 TABLET | Refills: 0 | Status: SHIPPED | OUTPATIENT
Start: 2021-07-06 | End: 2021-09-09

## 2021-07-06 RX ORDER — ALLOPURINOL 300 MG/1
TABLET ORAL
Qty: 90 TABLET | Refills: 0 | Status: SHIPPED | OUTPATIENT
Start: 2021-07-06 | End: 2022-05-30 | Stop reason: SDUPTHER

## 2021-07-16 ENCOUNTER — PES CALL (OUTPATIENT)
Dept: ADMINISTRATIVE | Facility: CLINIC | Age: 81
End: 2021-07-16

## 2021-09-01 DIAGNOSIS — N18.31 TYPE 2 DIABETES MELLITUS WITH STAGE 3A CHRONIC KIDNEY DISEASE, WITHOUT LONG-TERM CURRENT USE OF INSULIN: ICD-10-CM

## 2021-09-01 DIAGNOSIS — E11.22 TYPE 2 DIABETES MELLITUS WITH STAGE 3A CHRONIC KIDNEY DISEASE, WITHOUT LONG-TERM CURRENT USE OF INSULIN: ICD-10-CM

## 2021-09-02 RX ORDER — METFORMIN HYDROCHLORIDE 500 MG/1
TABLET, EXTENDED RELEASE ORAL
Qty: 360 TABLET | Refills: 0 | Status: SHIPPED | OUTPATIENT
Start: 2021-09-02 | End: 2021-09-09

## 2021-09-09 DIAGNOSIS — E78.2 MIXED HYPERLIPIDEMIA: Chronic | ICD-10-CM

## 2021-09-09 DIAGNOSIS — N18.31 TYPE 2 DIABETES MELLITUS WITH STAGE 3A CHRONIC KIDNEY DISEASE, WITHOUT LONG-TERM CURRENT USE OF INSULIN: ICD-10-CM

## 2021-09-09 DIAGNOSIS — I10 HYPERTENSION, UNSPECIFIED TYPE: ICD-10-CM

## 2021-09-09 DIAGNOSIS — E11.22 TYPE 2 DIABETES MELLITUS WITH STAGE 3A CHRONIC KIDNEY DISEASE, WITHOUT LONG-TERM CURRENT USE OF INSULIN: ICD-10-CM

## 2021-09-09 RX ORDER — ATORVASTATIN CALCIUM 40 MG/1
TABLET, FILM COATED ORAL
Qty: 90 TABLET | Refills: 0 | Status: SHIPPED | OUTPATIENT
Start: 2021-09-09 | End: 2022-03-17

## 2021-09-09 RX ORDER — AMLODIPINE BESYLATE 5 MG/1
TABLET ORAL
Qty: 90 TABLET | Refills: 0 | Status: SHIPPED | OUTPATIENT
Start: 2021-09-09 | End: 2022-03-17

## 2021-09-09 RX ORDER — METFORMIN HYDROCHLORIDE 500 MG/1
TABLET, EXTENDED RELEASE ORAL
Qty: 360 TABLET | Refills: 0 | Status: SHIPPED | OUTPATIENT
Start: 2021-09-09 | End: 2022-04-04

## 2021-09-27 DIAGNOSIS — I10 ESSENTIAL HYPERTENSION, BENIGN: ICD-10-CM

## 2021-09-28 RX ORDER — LOSARTAN POTASSIUM 100 MG/1
TABLET ORAL
Qty: 90 TABLET | Refills: 0 | Status: SHIPPED | OUTPATIENT
Start: 2021-09-28 | End: 2022-04-04

## 2021-10-06 ENCOUNTER — LAB VISIT (OUTPATIENT)
Dept: LAB | Facility: HOSPITAL | Age: 81
End: 2021-10-06
Attending: INTERNAL MEDICINE
Payer: MEDICARE

## 2021-10-06 DIAGNOSIS — Z11.59 NEED FOR HEPATITIS C SCREENING TEST: ICD-10-CM

## 2021-10-06 DIAGNOSIS — E78.2 MIXED HYPERLIPIDEMIA: Chronic | ICD-10-CM

## 2021-10-06 DIAGNOSIS — E11.22 TYPE 2 DIABETES MELLITUS WITH STAGE 3A CHRONIC KIDNEY DISEASE, WITHOUT LONG-TERM CURRENT USE OF INSULIN: ICD-10-CM

## 2021-10-06 DIAGNOSIS — I10 HYPERTENSION, UNSPECIFIED TYPE: ICD-10-CM

## 2021-10-06 DIAGNOSIS — N18.31 TYPE 2 DIABETES MELLITUS WITH STAGE 3A CHRONIC KIDNEY DISEASE, WITHOUT LONG-TERM CURRENT USE OF INSULIN: ICD-10-CM

## 2021-10-06 LAB
ALBUMIN SERPL BCP-MCNC: 3.6 G/DL (ref 3.5–5.2)
ALP SERPL-CCNC: 88 U/L (ref 55–135)
ALT SERPL W/O P-5'-P-CCNC: 13 U/L (ref 10–44)
ANION GAP SERPL CALC-SCNC: 10 MMOL/L (ref 8–16)
AST SERPL-CCNC: 21 U/L (ref 10–40)
BILIRUB SERPL-MCNC: 1.1 MG/DL (ref 0.1–1)
BUN SERPL-MCNC: 36 MG/DL (ref 8–23)
CALCIUM SERPL-MCNC: 9.6 MG/DL (ref 8.7–10.5)
CHLORIDE SERPL-SCNC: 108 MMOL/L (ref 95–110)
CHOLEST SERPL-MCNC: 149 MG/DL (ref 120–199)
CHOLEST/HDLC SERPL: 4.5 {RATIO} (ref 2–5)
CO2 SERPL-SCNC: 20 MMOL/L (ref 23–29)
CREAT SERPL-MCNC: 1.3 MG/DL (ref 0.5–1.4)
EST. GFR  (AFRICAN AMERICAN): 60 ML/MIN/1.73 M^2
EST. GFR  (NON AFRICAN AMERICAN): 51.9 ML/MIN/1.73 M^2
ESTIMATED AVG GLUCOSE: 171 MG/DL (ref 68–131)
GLUCOSE SERPL-MCNC: 121 MG/DL (ref 70–110)
HBA1C MFR BLD: 7.6 % (ref 4–5.6)
HDLC SERPL-MCNC: 33 MG/DL (ref 40–75)
HDLC SERPL: 22.1 % (ref 20–50)
LDLC SERPL CALC-MCNC: 83.6 MG/DL (ref 63–159)
NONHDLC SERPL-MCNC: 116 MG/DL
POTASSIUM SERPL-SCNC: 5.1 MMOL/L (ref 3.5–5.1)
PROT SERPL-MCNC: 7.9 G/DL (ref 6–8.4)
SODIUM SERPL-SCNC: 138 MMOL/L (ref 136–145)
TRIGL SERPL-MCNC: 162 MG/DL (ref 30–150)

## 2021-10-06 PROCEDURE — 83036 HEMOGLOBIN GLYCOSYLATED A1C: CPT | Performed by: INTERNAL MEDICINE

## 2021-10-06 PROCEDURE — 86803 HEPATITIS C AB TEST: CPT | Performed by: INTERNAL MEDICINE

## 2021-10-06 PROCEDURE — 80061 LIPID PANEL: CPT | Performed by: INTERNAL MEDICINE

## 2021-10-06 PROCEDURE — 36415 COLL VENOUS BLD VENIPUNCTURE: CPT | Mod: PO | Performed by: INTERNAL MEDICINE

## 2021-10-06 PROCEDURE — 80053 COMPREHEN METABOLIC PANEL: CPT | Performed by: INTERNAL MEDICINE

## 2021-10-07 ENCOUNTER — TELEPHONE (OUTPATIENT)
Dept: FAMILY MEDICINE | Facility: CLINIC | Age: 81
End: 2021-10-07

## 2021-10-07 LAB — HCV AB SERPL QL IA: NEGATIVE

## 2021-11-16 ENCOUNTER — TELEPHONE (OUTPATIENT)
Dept: FAMILY MEDICINE | Facility: CLINIC | Age: 81
End: 2021-11-16
Payer: MEDICARE

## 2021-11-18 ENCOUNTER — TELEPHONE (OUTPATIENT)
Dept: FAMILY MEDICINE | Facility: CLINIC | Age: 81
End: 2021-11-18
Payer: MEDICARE

## 2021-11-19 ENCOUNTER — TELEPHONE (OUTPATIENT)
Dept: FAMILY MEDICINE | Facility: CLINIC | Age: 81
End: 2021-11-19
Payer: MEDICARE

## 2021-11-22 ENCOUNTER — OFFICE VISIT (OUTPATIENT)
Dept: FAMILY MEDICINE | Facility: CLINIC | Age: 81
End: 2021-11-22
Payer: MEDICARE

## 2021-11-22 VITALS
HEART RATE: 69 BPM | WEIGHT: 208.31 LBS | HEIGHT: 62 IN | OXYGEN SATURATION: 94 % | DIASTOLIC BLOOD PRESSURE: 52 MMHG | TEMPERATURE: 98 F | SYSTOLIC BLOOD PRESSURE: 128 MMHG | RESPIRATION RATE: 18 BRPM | BODY MASS INDEX: 38.33 KG/M2

## 2021-11-22 DIAGNOSIS — E78.2 MIXED HYPERLIPIDEMIA: Chronic | ICD-10-CM

## 2021-11-22 DIAGNOSIS — E66.9 OBESITY, CLASS I, BMI 30-34.9: Chronic | ICD-10-CM

## 2021-11-22 DIAGNOSIS — J61 PULMONARY ASBESTOSIS: Chronic | ICD-10-CM

## 2021-11-22 DIAGNOSIS — M47.816 SPONDYLOSIS OF LUMBAR REGION WITHOUT MYELOPATHY OR RADICULOPATHY: Chronic | ICD-10-CM

## 2021-11-22 DIAGNOSIS — J84.10 CALCIFIED GRANULOMA OF LUNG: ICD-10-CM

## 2021-11-22 DIAGNOSIS — K52.9 CHRONIC DIARRHEA: Chronic | ICD-10-CM

## 2021-11-22 DIAGNOSIS — I25.10 CORONARY ARTERY DISEASE INVOLVING NATIVE CORONARY ARTERY WITHOUT ANGINA PECTORIS, UNSPECIFIED WHETHER NATIVE OR TRANSPLANTED HEART: Chronic | ICD-10-CM

## 2021-11-22 DIAGNOSIS — I70.0 ATHEROSCLEROSIS OF AORTA: Chronic | ICD-10-CM

## 2021-11-22 DIAGNOSIS — N18.31 TYPE 2 DIABETES MELLITUS WITH STAGE 3A CHRONIC KIDNEY DISEASE, WITHOUT LONG-TERM CURRENT USE OF INSULIN: Primary | Chronic | ICD-10-CM

## 2021-11-22 DIAGNOSIS — J44.9 CHRONIC OBSTRUCTIVE PULMONARY DISEASE, UNSPECIFIED COPD TYPE: Chronic | ICD-10-CM

## 2021-11-22 DIAGNOSIS — E11.22 TYPE 2 DIABETES MELLITUS WITH STAGE 3A CHRONIC KIDNEY DISEASE, WITHOUT LONG-TERM CURRENT USE OF INSULIN: Primary | Chronic | ICD-10-CM

## 2021-11-22 DIAGNOSIS — M1A.9XX0 CHRONIC GOUT WITHOUT TOPHUS, UNSPECIFIED CAUSE, UNSPECIFIED SITE: Chronic | ICD-10-CM

## 2021-11-22 DIAGNOSIS — I10 ESSENTIAL HYPERTENSION, BENIGN: Chronic | ICD-10-CM

## 2021-11-22 DIAGNOSIS — J31.0 CHRONIC RHINITIS: ICD-10-CM

## 2021-11-22 PROCEDURE — 99214 PR OFFICE/OUTPT VISIT, EST, LEVL IV, 30-39 MIN: ICD-10-PCS | Mod: S$GLB,,, | Performed by: NURSE PRACTITIONER

## 2021-11-22 PROCEDURE — 99999 PR PBB SHADOW E&M-EST. PATIENT-LVL V: ICD-10-PCS | Mod: PBBFAC,,, | Performed by: NURSE PRACTITIONER

## 2021-11-22 PROCEDURE — 99499 UNLISTED E&M SERVICE: CPT | Mod: S$GLB,,, | Performed by: NURSE PRACTITIONER

## 2021-11-22 PROCEDURE — 99214 OFFICE O/P EST MOD 30 MIN: CPT | Mod: S$GLB,,, | Performed by: NURSE PRACTITIONER

## 2021-11-22 PROCEDURE — 99499 RISK ADDL DX/OHS AUDIT: ICD-10-PCS | Mod: S$GLB,,, | Performed by: NURSE PRACTITIONER

## 2021-11-22 PROCEDURE — 99999 PR PBB SHADOW E&M-EST. PATIENT-LVL V: CPT | Mod: PBBFAC,,, | Performed by: NURSE PRACTITIONER

## 2021-11-22 RX ORDER — ALBUTEROL SULFATE 90 UG/1
2 AEROSOL, METERED RESPIRATORY (INHALATION) EVERY 6 HOURS PRN
Qty: 18 G | Refills: 1 | Status: SHIPPED | OUTPATIENT
Start: 2021-11-22

## 2021-11-22 RX ORDER — FLUTICASONE FUROATE AND VILANTEROL TRIFENATATE 200; 25 UG/1; UG/1
1 POWDER RESPIRATORY (INHALATION) DAILY
Qty: 60 EACH | Refills: 5 | Status: SHIPPED | OUTPATIENT
Start: 2021-11-22

## 2021-11-22 RX ORDER — AZELASTINE 1 MG/ML
2 SPRAY, METERED NASAL 2 TIMES DAILY
Qty: 30 ML | Refills: 0 | Status: SHIPPED | OUTPATIENT
Start: 2021-11-22 | End: 2022-11-22

## 2021-11-22 RX ORDER — CHOLESTYRAMINE 4 G/4.8G
1 POWDER, FOR SUSPENSION ORAL 2 TIMES DAILY
Qty: 180 PACKET | Refills: 0 | Status: SHIPPED | OUTPATIENT
Start: 2021-11-22 | End: 2022-04-12

## 2021-12-16 DIAGNOSIS — I25.10 CORONARY ARTERY DISEASE INVOLVING NATIVE CORONARY ARTERY WITHOUT ANGINA PECTORIS, UNSPECIFIED WHETHER NATIVE OR TRANSPLANTED HEART: Chronic | ICD-10-CM

## 2021-12-17 RX ORDER — METOPROLOL SUCCINATE 50 MG/1
TABLET, EXTENDED RELEASE ORAL
Qty: 90 TABLET | Refills: 3 | Status: SHIPPED | OUTPATIENT
Start: 2021-12-17 | End: 2022-12-15

## 2022-03-09 DIAGNOSIS — E78.2 MIXED HYPERLIPIDEMIA: Chronic | ICD-10-CM

## 2022-03-09 DIAGNOSIS — I10 HYPERTENSION, UNSPECIFIED TYPE: ICD-10-CM

## 2022-03-17 RX ORDER — ATORVASTATIN CALCIUM 40 MG/1
TABLET, FILM COATED ORAL
Qty: 90 TABLET | Refills: 1 | Status: SHIPPED | OUTPATIENT
Start: 2022-03-17 | End: 2022-10-04

## 2022-03-17 RX ORDER — AMLODIPINE BESYLATE 5 MG/1
TABLET ORAL
Qty: 90 TABLET | Refills: 1 | Status: SHIPPED | OUTPATIENT
Start: 2022-03-17 | End: 2022-10-03

## 2022-03-17 NOTE — TELEPHONE ENCOUNTER
Care Due:                  Date            Visit Type   Department     Provider  --------------------------------------------------------------------------------                                PHYSICAL -   [facility]                              ESTABLISHED   MED/ INTERNAL  Last Visit: 11-      PATIENT      MED/ PEDS      Deepak Bee                               -         [facility]                              PRIMARY      MED/ INTERNAL  Next Visit: 05-      CARE ([facility])   MED/ PEDS      Deepak Bee                                                            Last  Test          Frequency    Reason                     Performed    Due Date  --------------------------------------------------------------------------------    CBC.........  12 months..  allopurinoL..............  Not Found    Overdue    HBA1C.......  6 months...  JANUVIA, metFORMIN.......  10-   04-    Uric Acid...  12 months..  allopurinoL..............  Not Found    Overdue    Powered by [employer]. Reference number: [id number].   3/17/2022 10:11:10 AM CDT

## 2022-03-17 NOTE — TELEPHONE ENCOUNTER
Encounter details require adjustment(s)/ updating by OR Staff  As of this time CDM and/or protocols did not populate or display  Adjustment(s) made to encounter provider and/or department  Will resend refill request encounter to P Centralized Refill Staff Pool.   [facility] [place]

## 2022-03-17 NOTE — TELEPHONE ENCOUNTER
Refill Routing Note   Medication(s) are not appropriate for processing by [facility] for the following reason(s):      - Patient has not been seen in over 15 months by PCP    [facility] action(s):  Defer Medication-related problems identified: Requires appointment     Medication Therapy Plan: Pt has not been seen in over 15 months  --->Care Gap information included in message below if applicable.   Medication reconciliation completed: No   Automatic Epic Generated Protocol Data:        Requested Prescriptions   Pending Prescriptions Disp Refills    amLODIPine (NORVASC) 5 MG tablet [Pharmacy Med Name: AMLODIPINE BESYLATE 5 MG Tablet] 90 tablet 2     Sig: TAKE 1 TABLET EVERY DAY       Cardiovascular:  Calcium Channel Blockers Failed - 3/17/2022 10:10 AM        Failed - Valid encounter within last 15 months     Recent Visits  Date Type Provider Dept   11/09/20 Office Visit Deepak Bee MD [facility] Family Med/ Internal Med/ Peds   Showing recent visits within past 720 days and meeting all other requirements  Future Appointments  No visits were found meeting these conditions.  Showing future appointments within next 150 days and meeting all other requirements      Future Appointments              In 2 months [facility], [facility] [place]    [facility]                Passed - Patient is at least 18 years old        Passed - Last BP in normal range within 360 days     BP Readings from Last 3 Encounters:   11/22/21 (!) 128/52   04/13/21 132/68   11/09/20 138/80              Calcium-Channel Blockers Protocol Passed - 3/9/2022  1:00 PM        Passed - Visit with authorizing provider in past 12 months or upcoming 90 days         Passed - Blood Pressure below 139/89 on file in past 12 months      BP Readings from Last 3 Encounters:   11/22/21 (!) 128/52   04/13/21 132/68   11/09/20 138/80               atorvastatin (LIPITOR) 40 MG tablet [Pharmacy Med Name:  ATORVASTATIN CALCIUM 40 MG Tablet] 90 tablet 1     Sig: TAKE 1 TABLET EVERY DAY       Cardiovascular:  Antilipid - Statins Failed - 3/17/2022 10:10 AM        Failed - Valid encounter within last 15 months     Recent Visits  Date Type Provider Dept   11/09/20 Office Visit Deepak Bee MD [facility] Family Med/ Internal Med/ Peds   Showing recent visits within past 720 days and meeting all other requirements  Future Appointments  No visits were found meeting these conditions.  Showing future appointments within next 150 days and meeting all other requirements      Future Appointments              In 2 months LAB, [facility] [place]    In 2 months Deepak Bee MD [facility], [place]                Passed - Patient is at least 18 years old        Passed - ALT is 131 or below and within 360 days     ALT   Date Value Ref Range Status   10/06/2021 13 10 - 44 U/L Final   04/30/2021 14 10 - 44 U/L Final   07/08/2020 14 10 - 44 U/L Final     ALT (SGPT), POC   Date Value Ref Range Status   08/22/2018 27 10 - 47 U/L Final              Passed - AST is 119 or below and within 360 days     AST   Date Value Ref Range Status   10/06/2021 21 10 - 40 U/L Final   04/30/2021 21 10 - 40 U/L Final   07/08/2020 18 10 - 40 U/L Final     AST (SGOT), POC   Date Value Ref Range Status   08/22/2018 34 11 - 38 U/L Final              Passed - Total Cholesterol within 360 days     Lab Results   Component Value Date    CHOL 149 10/06/2021    CHOL 144 10/26/2019    CHOL 131 05/29/2018                Passed - LDL within 360 days     LDL Cholesterol   Date Value Ref Range Status   10/06/2021 83.6 63.0 - 159.0 mg/dL Final     Comment:     The National Cholesterol Education Program (NCEP) has set the  following guidelines (reference values) for LDL Cholesterol:  Optimal.......................<130 mg/dL  Borderline High...............130-159 mg/dL  High..........................160-189 mg/dL  Very  High.....................>190 mg/dL              Passed - HDL within 360 days     HDL   Date Value Ref Range Status   10/06/2021 33 (L) 40 - 75 mg/dL Final     Comment:     The National Cholesterol Education Program (NCEP) has set the  following guidelines (reference values) for HDL Cholesterol:  Low...............<40 mg/dL  Optimal...........>60 mg/dL              Passed - Triglycerides within 360 days     Lab Results   Component Value Date    TRIG 162 (H) 10/06/2021    TRIG 193 (H) 10/26/2019    TRIG 119 05/29/2018             Hmg CoA Reductase Inhibitors Protocol Passed - 3/9/2022  1:00 PM        Passed - Recent or future visit with authorizing provider        Passed - Lipid Panel within past 12 months     Lab Results   Component Value Date    CHOL 149 10/06/2021    HDL 33 (L) 10/06/2021    LDLCALC 83.6 10/06/2021    TRIG 162 (H) 10/06/2021             Passed - ALT less than 95 in past 12 months      Lab Results   Component Value Date    ALT 13 10/06/2021    ALT 14 04/30/2021    ALT 14 07/08/2020                    Appointments  past 12m or future 3m with PCP    Date Provider   Last Visit   11/9/2020 Deepak Bee MD   Next Visit   5/30/2022 Deepak Bee MD   ED visits in past 90 days: 0        Note composed:10:16 AM 03/17/2022

## 2022-03-28 ENCOUNTER — TELEPHONE (OUTPATIENT)
Dept: FAMILY MEDICINE | Facility: CLINIC | Age: 82
End: 2022-03-28
Payer: MEDICARE

## 2022-03-28 NOTE — TELEPHONE ENCOUNTER
----- Message from Mariposa Katz sent at 3/28/2022 11:18 AM CDT -----  Type: Patient Call Back       What is the request in detail:  pt calling to speak to a nurse regarding a1c labs      Can the clinic reply by [facility]? No       Would the patient rather a call back or a response via [facility]? Call back       Best call back number: [phone]        Thank you.

## 2022-04-01 DIAGNOSIS — E11.22 TYPE 2 DIABETES MELLITUS WITH STAGE 3A CHRONIC KIDNEY DISEASE, WITHOUT LONG-TERM CURRENT USE OF INSULIN: ICD-10-CM

## 2022-04-01 DIAGNOSIS — E11.22 TYPE 2 DIABETES MELLITUS WITH STAGE 3 CHRONIC KIDNEY DISEASE, WITHOUT LONG-TERM CURRENT USE OF INSULIN: Chronic | ICD-10-CM

## 2022-04-01 DIAGNOSIS — N18.31 TYPE 2 DIABETES MELLITUS WITH STAGE 3A CHRONIC KIDNEY DISEASE, WITHOUT LONG-TERM CURRENT USE OF INSULIN: ICD-10-CM

## 2022-04-01 DIAGNOSIS — N18.30 TYPE 2 DIABETES MELLITUS WITH STAGE 3 CHRONIC KIDNEY DISEASE, WITHOUT LONG-TERM CURRENT USE OF INSULIN: Chronic | ICD-10-CM

## 2022-04-01 DIAGNOSIS — I10 ESSENTIAL HYPERTENSION, BENIGN: ICD-10-CM

## 2022-04-01 NOTE — TELEPHONE ENCOUNTER
No new care gaps identified.  Powered by [employer]. Reference number: [id number].   4/01/2022 6:48:07 PM CDT

## 2022-04-04 RX ORDER — METFORMIN HYDROCHLORIDE 500 MG/1
TABLET, EXTENDED RELEASE ORAL
Qty: 360 TABLET | Refills: 0 | Status: SHIPPED | OUTPATIENT
Start: 2022-04-04 | End: 2022-07-20

## 2022-04-04 RX ORDER — LOSARTAN POTASSIUM 100 MG/1
TABLET ORAL
Qty: 90 TABLET | Refills: 0 | Status: SHIPPED | OUTPATIENT
Start: 2022-04-04 | End: 2022-07-20

## 2022-04-04 RX ORDER — GLIPIZIDE 10 MG/1
TABLET ORAL
Qty: 180 TABLET | Refills: 0 | OUTPATIENT
Start: 2022-04-04

## 2022-04-04 NOTE — TELEPHONE ENCOUNTER
This Rx Request does not qualify for assessment with the OR   Please review protocol details and the Care Due Message for extra clinical information    Reasons Rx Request may be deferred:  Labs/Vitals overdue  Labs/Vitals abnormal  Pt due for OV with PCP    Note composed:12:58 PM 04/04/2022

## 2022-04-08 DIAGNOSIS — M1A.9XX0 CHRONIC GOUT WITHOUT TOPHUS, UNSPECIFIED CAUSE, UNSPECIFIED SITE: ICD-10-CM

## 2022-04-08 DIAGNOSIS — K52.9 CHRONIC DIARRHEA: Chronic | ICD-10-CM

## 2022-04-08 DIAGNOSIS — N18.30 TYPE 2 DIABETES MELLITUS WITH STAGE 3 CHRONIC KIDNEY DISEASE, WITHOUT LONG-TERM CURRENT USE OF INSULIN: Chronic | ICD-10-CM

## 2022-04-08 DIAGNOSIS — E11.22 TYPE 2 DIABETES MELLITUS WITH STAGE 3 CHRONIC KIDNEY DISEASE, WITHOUT LONG-TERM CURRENT USE OF INSULIN: Chronic | ICD-10-CM

## 2022-04-08 RX ORDER — ALLOPURINOL 300 MG/1
TABLET ORAL
Qty: 90 TABLET | Refills: 0 | OUTPATIENT
Start: 2022-04-08

## 2022-04-08 NOTE — TELEPHONE ENCOUNTER
[facility] Note  Quick DC. Inappropriate Request   Refill request requires further review by MD: NO   Medication Therapy Plan: Pharmacy is requesting new script(s) for the following medications without required information, (sig/ frequency/qty/etc)     [facility] action(s):  Quick Discontinue      Duplicate Pended Encounter(s)/ Last Prescribed Details:    Pharmacies have been requesting medications for patients without required information, (sig, frequency, qty, etc.). In addition, requests are sent for medication(s) pt. are currently not taking, and medications patients have never taken.    We have spoken to the pharmacies about these request types and advised their teams previously that we are unable to assess these New Script requests and require all details for these requests. This is a known issue and has been reported.        Medication related problems are not assessed for QDC.   Medication Reconciliation Completed? NO Were there pending details that required adjustment? NO     Automatic Epic Generated Protocol Data Below:   Requested Prescriptions   Pending Prescriptions Disp Refills    allopurinoL (ZYLOPRIM) 300 MG tablet [Pharmacy Med Name: ALLOPURINOL 300MG TAB] 90 tablet 0              Appointments      Date Provider   Last Visit   11/9/2020 Deepak Bee MD   Next Visit   5/30/2022 Deepak Bee MD        Note composed:3:42 PM 04/08/2022

## 2022-04-08 NOTE — TELEPHONE ENCOUNTER
No new care gaps identified.  Powered by [email or web address]. Reference number: [id number].   4/08/2022 12:12:02 PM CDT

## 2022-04-08 NOTE — TELEPHONE ENCOUNTER
No new care gaps identified.  Powered by [employer]. Reference number: [id number].   4/08/2022 3:53:47 PM CDT

## 2022-04-11 RX ORDER — GLIPIZIDE 10 MG/1
TABLET ORAL
Qty: 180 TABLET | Refills: 0 | OUTPATIENT
Start: 2022-04-11

## 2022-04-11 NOTE — TELEPHONE ENCOUNTER
Quick DC. Request already responded to by other means (e.g. phone or fax)   Refill Authorization Note   James Mae  is requesting a refill authorization.  Brief Assessment and Rationale for Refill:  Quick Discontinue  Medication Therapy Plan:       Medication Reconciliation Completed:  No      Comments:     Note composed:10:31 AM 04/11/2022

## 2022-04-12 RX ORDER — CHOLESTYRAMINE LIGHT 4 G/5.7G
POWDER, FOR SUSPENSION ORAL
Qty: 180 PACKET | Refills: 1 | Status: SHIPPED | OUTPATIENT
Start: 2022-04-12

## 2022-05-23 ENCOUNTER — LAB VISIT (OUTPATIENT)
Dept: LAB | Facility: HOSPITAL | Age: 82
End: 2022-05-23
Attending: NURSE PRACTITIONER
Payer: MEDICARE

## 2022-05-23 DIAGNOSIS — E11.22 TYPE 2 DIABETES MELLITUS WITH STAGE 3A CHRONIC KIDNEY DISEASE, WITHOUT LONG-TERM CURRENT USE OF INSULIN: Chronic | ICD-10-CM

## 2022-05-23 DIAGNOSIS — N18.31 TYPE 2 DIABETES MELLITUS WITH STAGE 3A CHRONIC KIDNEY DISEASE, WITHOUT LONG-TERM CURRENT USE OF INSULIN: Chronic | ICD-10-CM

## 2022-05-23 LAB
ANION GAP SERPL CALC-SCNC: 10 MMOL/L (ref 8–16)
BUN SERPL-MCNC: 26 MG/DL (ref 8–23)
CALCIUM SERPL-MCNC: 8.9 MG/DL (ref 8.7–10.5)
CHLORIDE SERPL-SCNC: 112 MMOL/L (ref 95–110)
CO2 SERPL-SCNC: 17 MMOL/L (ref 23–29)
CREAT SERPL-MCNC: 1.2 MG/DL (ref 0.5–1.4)
EST. GFR  (AFRICAN AMERICAN): >60 ML/MIN/1.73 M^2
EST. GFR  (NON AFRICAN AMERICAN): 57.2 ML/MIN/1.73 M^2
ESTIMATED AVG GLUCOSE: 200 MG/DL (ref 68–131)
GLUCOSE SERPL-MCNC: 129 MG/DL (ref 70–110)
HBA1C MFR BLD: 8.6 % (ref 4–5.6)
POTASSIUM SERPL-SCNC: 4.7 MMOL/L (ref 3.5–5.1)
SODIUM SERPL-SCNC: 139 MMOL/L (ref 136–145)

## 2022-05-23 PROCEDURE — 36415 COLL VENOUS BLD VENIPUNCTURE: CPT | Mod: PO | Performed by: NURSE PRACTITIONER

## 2022-05-23 PROCEDURE — 83036 HEMOGLOBIN GLYCOSYLATED A1C: CPT | Performed by: NURSE PRACTITIONER

## 2022-05-23 PROCEDURE — 80048 BASIC METABOLIC PNL TOTAL CA: CPT | Performed by: NURSE PRACTITIONER

## 2022-05-30 ENCOUNTER — OFFICE VISIT (OUTPATIENT)
Dept: FAMILY MEDICINE | Facility: CLINIC | Age: 82
End: 2022-05-30
Payer: MEDICARE

## 2022-05-30 VITALS
BODY MASS INDEX: 37.02 KG/M2 | OXYGEN SATURATION: 95 % | TEMPERATURE: 98 F | SYSTOLIC BLOOD PRESSURE: 124 MMHG | DIASTOLIC BLOOD PRESSURE: 60 MMHG | HEART RATE: 78 BPM | HEIGHT: 62 IN | WEIGHT: 201.19 LBS

## 2022-05-30 DIAGNOSIS — M1A.9XX0 CHRONIC GOUT WITHOUT TOPHUS, UNSPECIFIED CAUSE, UNSPECIFIED SITE: ICD-10-CM

## 2022-05-30 DIAGNOSIS — I10 ESSENTIAL HYPERTENSION, BENIGN: Chronic | ICD-10-CM

## 2022-05-30 DIAGNOSIS — E78.2 MIXED HYPERLIPIDEMIA: ICD-10-CM

## 2022-05-30 DIAGNOSIS — I25.10 CORONARY ARTERY DISEASE INVOLVING NATIVE CORONARY ARTERY WITHOUT ANGINA PECTORIS, UNSPECIFIED WHETHER NATIVE OR TRANSPLANTED HEART: Chronic | ICD-10-CM

## 2022-05-30 DIAGNOSIS — E11.22 TYPE 2 DIABETES MELLITUS WITH STAGE 3A CHRONIC KIDNEY DISEASE, WITHOUT LONG-TERM CURRENT USE OF INSULIN: Chronic | ICD-10-CM

## 2022-05-30 DIAGNOSIS — Z00.00 ROUTINE MEDICAL EXAM: Primary | ICD-10-CM

## 2022-05-30 DIAGNOSIS — I70.0 ATHEROSCLEROSIS OF AORTA: Chronic | ICD-10-CM

## 2022-05-30 DIAGNOSIS — E66.01 SEVERE OBESITY WITH BODY MASS INDEX (BMI) OF 35.0 TO 39.9 WITH SERIOUS COMORBIDITY: Chronic | ICD-10-CM

## 2022-05-30 DIAGNOSIS — J44.9 CHRONIC OBSTRUCTIVE PULMONARY DISEASE, UNSPECIFIED COPD TYPE: ICD-10-CM

## 2022-05-30 DIAGNOSIS — N18.31 TYPE 2 DIABETES MELLITUS WITH STAGE 3A CHRONIC KIDNEY DISEASE, WITHOUT LONG-TERM CURRENT USE OF INSULIN: Chronic | ICD-10-CM

## 2022-05-30 PROCEDURE — 99397 PR PREVENTIVE VISIT,EST,65 & OVER: ICD-10-PCS | Mod: S$GLB,,, | Performed by: INTERNAL MEDICINE

## 2022-05-30 PROCEDURE — 99397 PER PM REEVAL EST PAT 65+ YR: CPT | Mod: S$GLB,,, | Performed by: INTERNAL MEDICINE

## 2022-05-30 PROCEDURE — 99215 OFFICE O/P EST HI 40 MIN: CPT | Mod: PBBFAC,PO | Performed by: INTERNAL MEDICINE

## 2022-05-30 PROCEDURE — 99999 PR PBB SHADOW E&M-EST. PATIENT-LVL V: ICD-10-PCS | Mod: PBBFAC,,, | Performed by: INTERNAL MEDICINE

## 2022-05-30 PROCEDURE — 99499 UNLISTED E&M SERVICE: CPT | Mod: S$GLB,,, | Performed by: INTERNAL MEDICINE

## 2022-05-30 PROCEDURE — 99499 RISK ADDL DX/OHS AUDIT: ICD-10-PCS | Mod: S$GLB,,, | Performed by: INTERNAL MEDICINE

## 2022-05-30 PROCEDURE — 99999 PR PBB SHADOW E&M-EST. PATIENT-LVL V: CPT | Mod: PBBFAC,,, | Performed by: INTERNAL MEDICINE

## 2022-05-30 RX ORDER — ALLOPURINOL 300 MG/1
TABLET ORAL
Qty: 90 TABLET | Refills: 3 | Status: SHIPPED | OUTPATIENT
Start: 2022-05-30 | End: 2023-03-17

## 2022-05-30 RX ORDER — ASPIRIN 81 MG/1
81 TABLET ORAL DAILY
Refills: 0 | COMMUNITY
Start: 2022-05-30 | End: 2023-05-30

## 2022-05-30 NOTE — ASSESSMENT & PLAN NOTE
Has re-enrolled after his phone was damaged.  Will keep meds the same and monitor with closer monitoring of his sugars. Could consider GLP-1 instead of DPP-4 if covered.      Continue ACE-I/ARB, sodium restriction, and avoidance of nephrotoxic agents.

## 2022-05-30 NOTE — PROGRESS NOTES
Assessment & Plan  Problem List Items Addressed This Visit        Pulmonary    COPD (chronic obstructive pulmonary disease) (Chronic)    Overview     Restrictive PFTs 2017           Current Assessment & Plan     [facility]  Reports stable results overall. Monitor               Cardiac/Vascular    Mixed hyperlipidemia (Chronic)    Current Assessment & Plan     Already in digital programs.  Will add on HLD.            Relevant Orders    Lipids Digital Medicine (LDMP) Enrollment Order (Completed)    Essential hypertension, benign (Chronic)    Overview     Enrolled in digital HTN           Current Assessment & Plan     The current medical regimen is effective;  continue present plan and medications.            CAD (coronary artery disease) (Chronic)    Overview     S/p 2 stents around 06-07           Relevant Medications    aspirin (ECOTRIN) 81 MG EC tablet    Atherosclerosis of aorta (Chronic)    Overview     CT chest April 2014.  Stable, asymptomatic chronic condition.  Will continue to maximize risk factor reduction and adjust medication as needed.               Endocrine    Type 2 diabetes mellitus with stage 3a chronic kidney disease, without long-term current use of insulin (Chronic)    Overview     Enrolled in [employer].  [facility]           Current Assessment & Plan     Has re-enrolled after his phone was damaged.  Will keep meds the same and monitor with closer monitoring of his sugars. Could consider GLP-1 instead of DPP-4 if covered.      Continue ACE-I/ARB, sodium restriction, and avoidance of nephrotoxic agents.            Relevant Orders    Hemoglobin A1C    Microalbumin/Creatinine Ratio, Urine    CBC Auto Differential    Protein/Creatinine Ratio, Urine    Severe obesity with body mass index (BMI) of 35.0 to 39.9 with serious comorbidity (Chronic)    Current Assessment & Plan     The patient's BMI has been recorded in the chart. The patient has been provided educational materials regarding the  benefits of attaining and maintaining a normal weight. We will continue to address and follow this issue during follow up visits.                Orthopedic    Gout, chronic (Chronic)    Current Assessment & Plan     The current medical regimen is effective;  continue present plan and medications.            Relevant Medications    allopurinoL (ZYLOPRIM) 300 MG tablet      Other Visit Diagnoses     Routine medical exam    -  Primary  -    Discussed healthy diet, regular exercise, necessary labs, age appropriate cancer screening, and routine vaccinations.               Health Maintenance reviewed, up to date.  Check urine in 3 months.  [facility]    Follow-up: Follow up in about 3 months (around 8/30/2022).  ______________________________________________________________________    Chief Complaint  Chief Complaint   Patient presents with    Annual Exam       HPI  James Mae  is a 81 y.o. male with medical diagnoses as listed in the medical history and problem list that presents for routine physical.  Pt is known to me with their last appointment 11/2021.  He had labs prior to this OV taht showed generally reassuring BMP.  A1c is up to 8.6%.      He continues to take care of his wife that has end stage dementia.  He has a new hobby of building model boats and painting ships.      He is feeling better since completing his dental work.      He has been having some Gi concerns but [facility].        PAST MEDICAL HISTORY:  Past Medical History:   Diagnosis Date    Arthritis     Asbestosis     Basal cell carcinoma 04/13/2021    central upper forehead    Cancer     reports skin cancer (10/25/19) right temporal area, [facility]     COPD with asthma     Coronary artery disease     Diabetes mellitus type II     Hyperlipidemia     Hypertension     Obesity     Renal manifestation of secondary diabetes mellitus     Trouble in sleeping        PAST SURGICAL  HISTORY:  Past Surgical History:   Procedure Laterality Date    APPENDECTOMY      8 yrs ago    CHOLECYSTECTOMY      8 yrs ago    CORONARY STENT PLACEMENT      SMALL INTESTINE SURGERY      8 yrs       SOCIAL HISTORY:  Social History     Socioeconomic History    Marital status:    Tobacco Use    Smoking status: Never Smoker    Smokeless tobacco: Never Used   Substance and Sexual Activity    Alcohol use: No    Drug use: No    Sexual activity: Yes     Partners: Female       FAMILY HISTORY:  Family History   Problem Relation Age of Onset    Cancer Mother         throat    Heart disease Father         heart attack    Stroke Sister     Melanoma Neg Hx        ALLERGIES AND MEDICATIONS: updated and reviewed.  Review of patient's allergies indicates:  No Known Allergies  Current Outpatient Medications   Medication Sig Dispense Refill    albuterol (PROAIR HFA) 90 mcg/actuation inhaler Inhale 2 puffs into the lungs every 6 (six) hours as needed for Wheezing. 18 g 1    amLODIPine (NORVASC) 5 MG tablet TAKE 1 TABLET EVERY DAY 90 tablet 1    atorvastatin (LIPITOR) 40 MG tablet TAKE 1 TABLET EVERY DAY 90 tablet 1    azelastine (ASTELIN) 137 mcg (0.1 %) nasal spray 2 sprays (274 mcg total) by Nasal route 2 (two) times daily. 30 mL 0    cetirizine (ZYRTEC) 10 MG tablet Take 1 tablet (10 mg total) by mouth every evening. 90 tablet 1    CHOLESTYRAMINE LIGHT 4 gram packet MIX THE CONTENTS OF 1 PACKET IN A NON CARBONATED BEVERAGE AND DRINK TWICE DAILY 180 packet 1    ergocalciferol (ERGOCALCIFEROL) 50,000 unit Cap Take 1 capsule PO ONCE A WEEK 12 capsule 4    fluticasone (FLONASE) 50 mcg/actuation nasal spray 2 sprays (100 mcg total) by Each Nare route once daily. 15 g 0    fluticasone furoate-vilanteroL (BREO ELLIPTA) 200-25 mcg/dose DsDv diskus inhaler Inhale 1 puff into the lungs once daily. 60 each 5    furosemide (LASIX) 40 MG tablet Take 40 mg by mouth once daily.      ibuprofen (ADVIL,MOTRIN) 600 MG  tablet Take 1 tablet (600 mg total) by mouth 3 (three) times daily. 30 tablet 0    JANUVIA 100 mg Tab TAKE 1 TABLET EVERY DAY 90 tablet 1    losartan (COZAAR) 100 MG tablet TAKE 1 TABLET EVERY DAY 90 tablet 0    meclizine (ANTIVERT) 25 mg tablet Take 1 tablet (25 mg total) by mouth 3 (three) times daily as needed for Dizziness. 90 tablet 0    metFORMIN (GLUCOPHAGE-XR) 500 MG ER 24hr tablet TAKE 2 TABLETS TWICE DAILY WITH MEALS 360 tablet 0    allopurinoL (ZYLOPRIM) 300 MG tablet TAKE 1 TABLET ONE TIME DAILY 90 tablet 3    aspirin (ECOTRIN) 81 MG EC tablet Take 1 tablet (81 mg total) by mouth once daily.  0    blood sugar diagnostic (BLOOD GLUCOSE TEST) [facility] Accu- Check  Smartview Test Strips. Test BID (Patient not taking: Reported on 5/30/2022) 100 strip 11    HYDROcodone-acetaminophen (NORCO) 5-325 mg per tablet Take 1 tablet by mouth every 6 (six) hours as needed for Pain. (Patient not taking: Reported on 5/30/2022) 10 tablet 0    lancets (ONETOUCH ULTRASOFT LANCETS) Misc Fastclix Lancets. Test BID (Patient not taking: Reported on 5/30/2022) 100 each 11    metoprolol succinate (TOPROL-XL) 50 MG 24 hr tablet TAKE 1 TABLET EVERY DAY (Patient not taking: Reported on 5/30/2022) 90 tablet 3    nitroGLYCERIN (NITROSTAT) 0.4 MG SL tablet Place 1 tablet (0.4 mg total) under the tongue every 5 (five) minutes as needed for Chest pain. 25 tablet 11     No current facility-administered medications for this visit.         ROS  Review of Systems   Constitutional: Negative for chills, fever and unexpected weight change.   HENT: Positive for hearing loss. Negative for congestion, dental problem, ear pain, rhinorrhea, sore throat and trouble swallowing.    Eyes: Negative for discharge, redness and visual disturbance.   Respiratory: Positive for shortness of breath. Negative for cough, chest tightness and wheezing.    Cardiovascular: Negative for chest pain, palpitations and leg swelling.   Gastrointestinal: Positive for  "constipation ([facility] Negative for abdominal pain, diarrhea, nausea and vomiting.   Endocrine: Negative for polydipsia, polyphagia and polyuria.   Genitourinary: Negative for decreased urine volume, dysuria and hematuria.   Musculoskeletal: Positive for arthralgias. Negative for myalgias.   Skin: Negative for color change and rash.   Neurological: Negative for dizziness, weakness, light-headedness and headaches.   Psychiatric/Behavioral: Negative for decreased concentration, dysphoric mood, sleep disturbance and suicidal ideas.           Physical Exam  Vitals:    05/30/22 0815 05/30/22 0857   BP: (!) 140/60 124/60   Pulse: 78    Temp: 97.6 °F (36.4 °C)    SpO2: 95%    Weight: 91.3 kg (201 lb 2.7 oz)    Height: 5' 2" (1.575 m)     Body mass index is 36.79 kg/m².  Weight: 91.3 kg (201 lb 2.7 oz)   Height: 5' 2" (157.5 cm)   Physical Exam  Constitutional:       General: He is not in acute distress.     Appearance: He is well-developed.   HENT:      Head: Normocephalic and atraumatic.   Eyes:      General: Lids are normal. No scleral icterus.     Conjunctiva/sclera: Conjunctivae normal.      Pupils: Pupils are equal, round, and reactive to light.   Neck:      Thyroid: No thyromegaly.      Vascular: No JVD.   Cardiovascular:      Rate and Rhythm: Normal rate and regular rhythm.      Heart sounds: Murmur heard.     No friction rub. No S3 or S4 sounds.   Pulmonary:      Effort: Pulmonary effort is normal.      Breath sounds: No decreased breath sounds, wheezing, rhonchi or rales.      Comments: Fibrotic sounds in bibasilar distribution  Abdominal:      General: Bowel sounds are normal. There is no distension.      Palpations: Abdomen is soft.      Tenderness: There is no abdominal tenderness.   Musculoskeletal:         General: No tenderness.      Cervical back: Full passive range of motion without pain and neck supple.      Right lower leg: No edema.      Left lower leg: No edema.   Skin:     General: Skin " is warm and dry.      Findings: No rash.   Neurological:      Mental Status: He is alert and oriented to person, place, and time.   Psychiatric:         Speech: Speech normal.         Behavior: Behavior normal.         Thought Content: Thought content normal.             Health Maintenance       Date Due Completion Date    Aspirin/Antiplatelet Therapy Never done ---    Eye Exam 08/10/2018 8/10/2017    Override on 3/15/2016: Done ([facility]- Jas Cooper MD-No diabetic retinopathy detected-Comments: Cataracts. Patient should return for re-evaluation in 12 months.)    Override on 11/20/2014: Done (cataracts.  No DM change)    Diabetes Urine Screening 12/03/2019 12/3/2018    Hemoglobin A1c 08/23/2022 5/23/2022    Lipid Panel 10/06/2022 10/6/2021    Foot Exam 11/22/2022 11/22/2021    Override on 7/26/2019: Done    TETANUS VACCINE 07/27/2029 7/27/2019

## 2022-07-20 DIAGNOSIS — N18.30 TYPE 2 DIABETES MELLITUS WITH STAGE 3 CHRONIC KIDNEY DISEASE, WITHOUT LONG-TERM CURRENT USE OF INSULIN: Chronic | ICD-10-CM

## 2022-07-20 DIAGNOSIS — I10 ESSENTIAL HYPERTENSION, BENIGN: ICD-10-CM

## 2022-07-20 DIAGNOSIS — E11.22 TYPE 2 DIABETES MELLITUS WITH STAGE 3 CHRONIC KIDNEY DISEASE, WITHOUT LONG-TERM CURRENT USE OF INSULIN: Chronic | ICD-10-CM

## 2022-07-20 DIAGNOSIS — E11.22 TYPE 2 DIABETES MELLITUS WITH STAGE 3A CHRONIC KIDNEY DISEASE, WITHOUT LONG-TERM CURRENT USE OF INSULIN: ICD-10-CM

## 2022-07-20 DIAGNOSIS — N18.31 TYPE 2 DIABETES MELLITUS WITH STAGE 3A CHRONIC KIDNEY DISEASE, WITHOUT LONG-TERM CURRENT USE OF INSULIN: ICD-10-CM

## 2022-07-20 RX ORDER — METFORMIN HYDROCHLORIDE 500 MG/1
TABLET, EXTENDED RELEASE ORAL
Qty: 360 TABLET | Refills: 1 | Status: SHIPPED | OUTPATIENT
Start: 2022-07-20 | End: 2023-02-28

## 2022-07-20 RX ORDER — LOSARTAN POTASSIUM 100 MG/1
TABLET ORAL
Qty: 90 TABLET | Refills: 3 | Status: SHIPPED | OUTPATIENT
Start: 2022-07-20 | End: 2023-07-24

## 2022-07-20 RX ORDER — GLIPIZIDE 10 MG/1
TABLET ORAL
Qty: 180 TABLET | Refills: 0 | OUTPATIENT
Start: 2022-07-20

## 2022-07-21 NOTE — TELEPHONE ENCOUNTER
Care Due:                  Date            Visit Type   Department     Provider  --------------------------------------------------------------------------------                                EP [facility]                              PRIMARY      MED/ INTERNAL  Last Visit: 05-      CARE ([facility])   MED/ PEDS      Deepak Bee  Next Visit: None Scheduled  None         None Found                                                            Last  Test          Frequency    Reason                     Performed    Due Date  --------------------------------------------------------------------------------    CBC.........  12 months..  allopurinoL..............  Not Found    Overdue    CMP.........  12 months..  CHOLESTYRAMINE,            10-   10-                             allopurinoL, atorvastatin    Lipid Panel.  12 months..  CHOLESTYRAMINE,            10-   10-                             atorvastatin.............    Uric Acid...  12 months..  allopurinoL..............  Not Found    Overdue    [employer] Embedded Care Gaps. Reference number: [id number]. 7/20/2022   7:12:17 PM CDT

## 2022-07-21 NOTE — TELEPHONE ENCOUNTER
Refill Decision Note   James Mae  is requesting a refill authorization.  Brief Assessment and Rationale for Refill:  Approve    -Medication-Related Problems Identified: Requires labs  Medication Therapy Plan:       Medication Reconciliation Completed: No   Comments:     Provider Staff:     Action is required for this patient.   Please see care gap opportunities below in Care Due Message.     Thanks!  [facility]     Appointments      Date Provider   Last Visit   5/30/2022 Deepak Bee MD   Next Visit   Visit date not found Deepak Bee MD     Note composed:7:26 PM 07/20/2022           Note composed:7:26 PM 07/20/2022

## 2022-07-21 NOTE — TELEPHONE ENCOUNTER
Left mess informing pt that the form he left at the [facility] for the dr to complete needs to be sign be him before dr can complete.

## 2022-07-25 ENCOUNTER — PATIENT MESSAGE (OUTPATIENT)
Dept: OTHER | Facility: OTHER | Age: 82
End: 2022-07-25
Payer: MEDICARE

## 2022-08-19 DIAGNOSIS — N18.30 TYPE 2 DIABETES MELLITUS WITH STAGE 3 CHRONIC KIDNEY DISEASE, WITHOUT LONG-TERM CURRENT USE OF INSULIN: Chronic | ICD-10-CM

## 2022-08-19 DIAGNOSIS — E11.22 TYPE 2 DIABETES MELLITUS WITH STAGE 3 CHRONIC KIDNEY DISEASE, WITHOUT LONG-TERM CURRENT USE OF INSULIN: Chronic | ICD-10-CM

## 2022-08-19 NOTE — TELEPHONE ENCOUNTER
No new care gaps identified.  [facility] Embedded Care Gaps. Reference number: [id number]. 8/19/2022   1:41:49 PM CDT

## 2022-08-19 NOTE — TELEPHONE ENCOUNTER
Refill Decision Note   James aMe  is requesting a refill authorization.  Brief Assessment and Rationale for Refill:  Quick Discontinue     Medication Therapy Plan:    Pharmacy is requesting new scripts for the following medications without required information, (sig/ frequency/qty/etc)      Medication Reconciliation Completed: No     Comments: Pharmacies have been requesting medications for patients without required information, (sig, frequency, qty, etc.). In addition, requests are sent for medication(s) pt. are currently not taking, and medications patients have never taken.    We have spoken to the pharmacies about these request types and advised their teams previously that we are unable to assess these New Script requests and require all details for these requests. This is a known issue and has been reported.     Note composed:6:41 PM 08/19/2022

## 2022-08-31 ENCOUNTER — LAB VISIT (OUTPATIENT)
Dept: LAB | Facility: HOSPITAL | Age: 82
End: 2022-08-31
Attending: INTERNAL MEDICINE
Payer: MEDICARE

## 2022-08-31 DIAGNOSIS — E11.22 TYPE 2 DIABETES MELLITUS WITH STAGE 3A CHRONIC KIDNEY DISEASE, WITHOUT LONG-TERM CURRENT USE OF INSULIN: Chronic | ICD-10-CM

## 2022-08-31 DIAGNOSIS — N18.31 TYPE 2 DIABETES MELLITUS WITH STAGE 3A CHRONIC KIDNEY DISEASE, WITHOUT LONG-TERM CURRENT USE OF INSULIN: Chronic | ICD-10-CM

## 2022-08-31 LAB
BASOPHILS # BLD AUTO: 0.06 K/UL (ref 0–0.2)
BASOPHILS NFR BLD: 0.6 % (ref 0–1.9)
DIFFERENTIAL METHOD: ABNORMAL
EOSINOPHIL # BLD AUTO: 0.5 K/UL (ref 0–0.5)
EOSINOPHIL NFR BLD: 4.3 % (ref 0–8)
ERYTHROCYTE [DISTWIDTH] IN BLOOD BY AUTOMATED COUNT: 13.7 % (ref 11.5–14.5)
ESTIMATED AVG GLUCOSE: 197 MG/DL (ref 68–131)
HBA1C MFR BLD: 8.5 % (ref 4–5.6)
HCT VFR BLD AUTO: 35.3 % (ref 40–54)
HGB BLD-MCNC: 11.6 G/DL (ref 14–18)
IMM GRANULOCYTES # BLD AUTO: 0.05 K/UL (ref 0–0.04)
IMM GRANULOCYTES NFR BLD AUTO: 0.5 % (ref 0–0.5)
LYMPHOCYTES # BLD AUTO: 2.2 K/UL (ref 1–4.8)
LYMPHOCYTES NFR BLD: 20.7 % (ref 18–48)
MCH RBC QN AUTO: 31.4 PG (ref 27–31)
MCHC RBC AUTO-ENTMCNC: 32.9 G/DL (ref 32–36)
MCV RBC AUTO: 95 FL (ref 82–98)
MONOCYTES # BLD AUTO: 1.1 K/UL (ref 0.3–1)
MONOCYTES NFR BLD: 10.2 % (ref 4–15)
NEUTROPHILS # BLD AUTO: 6.9 K/UL (ref 1.8–7.7)
NEUTROPHILS NFR BLD: 63.7 % (ref 38–73)
NRBC BLD-RTO: 0 /100 WBC
PLATELET # BLD AUTO: 240 K/UL (ref 150–450)
PMV BLD AUTO: 10.2 FL (ref 9.2–12.9)
RBC # BLD AUTO: 3.7 M/UL (ref 4.6–6.2)
WBC # BLD AUTO: 10.82 K/UL (ref 3.9–12.7)

## 2022-08-31 PROCEDURE — 85025 COMPLETE CBC W/AUTO DIFF WBC: CPT | Performed by: INTERNAL MEDICINE

## 2022-08-31 PROCEDURE — 36415 COLL VENOUS BLD VENIPUNCTURE: CPT | Mod: PO | Performed by: INTERNAL MEDICINE

## 2022-08-31 PROCEDURE — 83036 HEMOGLOBIN GLYCOSYLATED A1C: CPT | Performed by: INTERNAL MEDICINE

## 2022-08-31 NOTE — PROGRESS NOTES
Your blood sugar results are still running high.  We will continue to adjust the semaglutide (Rybelsus).  Please continue your current medications and doses.  Please feel free to contact me with any questions or concerns.    Sincerely,  Deepak Bee  [email or web address]

## 2023-02-28 ENCOUNTER — PATIENT MESSAGE (OUTPATIENT)
Dept: ADMINISTRATIVE | Facility: HOSPITAL | Age: 83
End: 2023-02-28
Payer: MEDICARE

## 2023-05-30 ENCOUNTER — PATIENT MESSAGE (OUTPATIENT)
Dept: ADMINISTRATIVE | Facility: HOSPITAL | Age: 83
End: 2023-05-30
Payer: MEDICARE

## 2023-06-26 ENCOUNTER — PATIENT MESSAGE (OUTPATIENT)
Dept: FAMILY MEDICINE | Facility: CLINIC | Age: 83
End: 2023-06-26
Payer: MEDICARE

## 2023-07-23 DIAGNOSIS — I10 ESSENTIAL HYPERTENSION, BENIGN: ICD-10-CM

## 2023-07-23 DIAGNOSIS — I25.10 CORONARY ARTERY DISEASE INVOLVING NATIVE CORONARY ARTERY WITHOUT ANGINA PECTORIS, UNSPECIFIED WHETHER NATIVE OR TRANSPLANTED HEART: Chronic | ICD-10-CM

## 2023-07-23 DIAGNOSIS — I10 HYPERTENSION, UNSPECIFIED TYPE: ICD-10-CM

## 2023-07-23 NOTE — TELEPHONE ENCOUNTER
Refill Routing Note   Medication(s) are not appropriate for processing by [facility] for the following reason(s):      Required labs outdated  Required vitals outdated    [facility] action(s):  Defer Care Due:  Appointment due  Labs due     Medication Therapy Plan: Digital Medicine: Clinician Follow-Up recorded patient's  /63 on 9/12/22. No FOVS    Extended chart review required: Yes     Appointments  past 12m or future 3m with PCP    Date Provider   Last Visit   5/30/2022 Deepak Bee MD   Next Visit   Visit date not found Deepak Bee MD   ED visits in past 90 days: 0        Note composed:12:50 PM 07/23/2023

## 2023-07-24 RX ORDER — LOSARTAN POTASSIUM 100 MG/1
TABLET ORAL
Qty: 90 TABLET | Refills: 0 | Status: SHIPPED | OUTPATIENT
Start: 2023-07-24 | End: 2023-12-14

## 2023-07-24 RX ORDER — METOPROLOL SUCCINATE 50 MG/1
TABLET, EXTENDED RELEASE ORAL
Qty: 90 TABLET | Refills: 0 | Status: SHIPPED | OUTPATIENT
Start: 2023-07-24 | End: 2023-12-14

## 2023-07-24 RX ORDER — AMLODIPINE BESYLATE 5 MG/1
TABLET ORAL
Qty: 90 TABLET | Refills: 0 | Status: SHIPPED | OUTPATIENT
Start: 2023-07-24 | End: 2023-12-14

## 2023-07-24 NOTE — TELEPHONE ENCOUNTER
Refill approved.  Due for routine physical with labs prior.  Please sched with me or Corrine    Thank you,  Erik

## 2023-09-13 ENCOUNTER — PATIENT MESSAGE (OUTPATIENT)
Dept: ADMINISTRATIVE | Facility: HOSPITAL | Age: 83
End: 2023-09-13
Payer: MEDICARE

## 2023-09-19 DIAGNOSIS — E78.2 MIXED HYPERLIPIDEMIA: Chronic | ICD-10-CM

## 2023-09-19 DIAGNOSIS — N18.31 TYPE 2 DIABETES MELLITUS WITH STAGE 3A CHRONIC KIDNEY DISEASE, WITHOUT LONG-TERM CURRENT USE OF INSULIN: ICD-10-CM

## 2023-09-19 DIAGNOSIS — E11.22 TYPE 2 DIABETES MELLITUS WITH STAGE 3A CHRONIC KIDNEY DISEASE, WITHOUT LONG-TERM CURRENT USE OF INSULIN: ICD-10-CM

## 2023-09-19 NOTE — TELEPHONE ENCOUNTER
Refill Routing Note   Medication(s) are not appropriate for processing by [facility] for the following reason(s):      Patient not seen by provider within 15 months  Required labs outdated: A1C/CMP/lipid panel    [facility] action(s):  Defer Care Due:  Appointment due  Labs due          Appointments  past 12m or future 3m with PCP    Date Provider   Last Visit   5/30/2022 Deepak Bee MD   Next Visit   Visit date not found Deepak Bee MD   ED visits in past 90 days: 0        Note composed:3:22 PM 09/19/2023

## 2023-09-19 NOTE — TELEPHONE ENCOUNTER
Care Due:                  Date            Visit Type   Department     Provider  --------------------------------------------------------------------------------                                [facility] FAMILY                              PRIMARY      MED/ INTERNAL  Last Visit: 05-      CARE ([facility])   MED/ PEDS      Deepak Bee  Next Visit: None Scheduled  None         None Found                                                            Last  Test          Frequency    Reason                     Performed    Due Date  --------------------------------------------------------------------------------    Office Visit  12 months..  CHOLESTYRAMINE,            05- 05-                             atorvastatin, losartan,                             metFORMIN, semaglutide...    CMP.........  12 months..  CHOLESTYRAMINE,            10-   10-                             atorvastatin, losartan,                             metFORMIN................    HBA1C.......  6 months...  metFORMIN, semaglutide...  08- 02-    Lipid Panel.  12 months..  CHOLESTYRAMINE,            10-   10-                             atorvastatin.............    [employer] Embedded Care Due Messages. Reference number: [id number].   9/19/2023 9:47:12 AM CDT

## 2023-09-20 ENCOUNTER — TELEPHONE (OUTPATIENT)
Dept: FAMILY MEDICINE | Facility: CLINIC | Age: 83
End: 2023-09-20
Payer: MEDICARE

## 2023-09-20 RX ORDER — ATORVASTATIN CALCIUM 40 MG/1
TABLET, FILM COATED ORAL
Qty: 90 TABLET | Refills: 0 | Status: SHIPPED | OUTPATIENT
Start: 2023-09-20 | End: 2024-02-26

## 2023-09-20 RX ORDER — METFORMIN HYDROCHLORIDE 500 MG/1
TABLET, EXTENDED RELEASE ORAL
Qty: 360 TABLET | Refills: 0 | Status: SHIPPED | OUTPATIENT
Start: 2023-09-20 | End: 2024-03-12

## 2023-09-20 NOTE — TELEPHONE ENCOUNTER
Please call patient, he has not been seen in clinic in almost 1.5 years. We will need to set him up an appointment for routine visit. Ideally with Dr. Bee, but I can see him if needed.    Thanks!  Corrine ROBLEDO

## 2023-11-06 ENCOUNTER — PATIENT MESSAGE (OUTPATIENT)
Dept: ADMINISTRATIVE | Facility: HOSPITAL | Age: 83
End: 2023-11-06
Payer: MEDICARE

## 2023-12-13 DIAGNOSIS — I10 HYPERTENSION, UNSPECIFIED TYPE: ICD-10-CM

## 2023-12-13 DIAGNOSIS — I25.10 CORONARY ARTERY DISEASE INVOLVING NATIVE CORONARY ARTERY WITHOUT ANGINA PECTORIS, UNSPECIFIED WHETHER NATIVE OR TRANSPLANTED HEART: Chronic | ICD-10-CM

## 2023-12-13 DIAGNOSIS — I10 ESSENTIAL HYPERTENSION, BENIGN: ICD-10-CM

## 2023-12-13 NOTE — TELEPHONE ENCOUNTER
Care Due:                  Date            Visit Type   Department     Provider  --------------------------------------------------------------------------------                                [facility]                              PRIMARY      MED/ INTERNAL  Last Visit: 05-      CARE ([facility])   MED/ PEDS      Deepak Bee  Next Visit: None Scheduled  None         None Found                                                            Last  Test          Frequency    Reason                     Performed    Due Date  --------------------------------------------------------------------------------    Office Visit  15 months..  CHOLESTYRAMINE, losartan,   05- 08-                             semaglutide..............    CMP.........  12 months..  CHOLESTYRAMINE, losartan.  Not Found    Overdue    HBA1C.......  6 months...  semaglutide..............  08- 02-    Lipid Panel.  12 months..  CHOLESTYRAMINE...........  Not Found    Overdue    [employer] Embedded Care Due Messages. Reference number: [id number].   12/13/2023 2:24:20 AM CST

## 2023-12-14 RX ORDER — AMLODIPINE BESYLATE 5 MG/1
TABLET ORAL
Qty: 90 TABLET | Refills: 3 | Status: SHIPPED | OUTPATIENT
Start: 2023-12-14

## 2023-12-14 RX ORDER — METOPROLOL SUCCINATE 50 MG/1
TABLET, EXTENDED RELEASE ORAL
Qty: 90 TABLET | Refills: 3 | Status: SHIPPED | OUTPATIENT
Start: 2023-12-14

## 2023-12-14 RX ORDER — LOSARTAN POTASSIUM 100 MG/1
TABLET ORAL
Qty: 90 TABLET | Refills: 3 | Status: SHIPPED | OUTPATIENT
Start: 2023-12-14

## 2024-02-25 DIAGNOSIS — E78.2 MIXED HYPERLIPIDEMIA: Chronic | ICD-10-CM

## 2024-02-25 DIAGNOSIS — I10 ESSENTIAL HYPERTENSION, BENIGN: Primary | Chronic | ICD-10-CM

## 2024-02-25 DIAGNOSIS — I25.10 CORONARY ARTERY DISEASE INVOLVING NATIVE CORONARY ARTERY WITHOUT ANGINA PECTORIS, UNSPECIFIED WHETHER NATIVE OR TRANSPLANTED HEART: Chronic | ICD-10-CM

## 2024-02-25 DIAGNOSIS — E11.22 TYPE 2 DIABETES MELLITUS WITH STAGE 3A CHRONIC KIDNEY DISEASE, WITHOUT LONG-TERM CURRENT USE OF INSULIN: Chronic | ICD-10-CM

## 2024-02-25 DIAGNOSIS — N18.31 TYPE 2 DIABETES MELLITUS WITH STAGE 3A CHRONIC KIDNEY DISEASE, WITHOUT LONG-TERM CURRENT USE OF INSULIN: Chronic | ICD-10-CM

## 2024-02-26 RX ORDER — ATORVASTATIN CALCIUM 40 MG/1
40 TABLET, FILM COATED ORAL DAILY
Qty: 90 TABLET | Refills: 0 | Status: SHIPPED | OUTPATIENT
Start: 2024-02-26 | End: 2024-05-09

## 2024-02-26 NOTE — TELEPHONE ENCOUNTER
Refill approved.  Due for routine physical with labs prior.  Please sched with me or Corrine Mg at [facility]    If the patient prefers to keep their care on the [facility], they will need to be scheduled with a physician of their choosing to establish care since we have moved to the [facility] location.     Thank you,  Erik

## 2024-02-26 NOTE — TELEPHONE ENCOUNTER
Care Due:                  Date            Visit Type   Department     Provider  --------------------------------------------------------------------------------                                [facility]                              PRIMARY      MED/ INTERNAL  Last Visit: 05-      CARE ([facility])   MED/ PEDS      Deepak Bee  Next Visit: None Scheduled  None         None Found                                                            Last  Test          Frequency    Reason                     Performed    Due Date  --------------------------------------------------------------------------------    Office Visit  15 months..  CHOLESTYRAMINE,            05- 08-                             semaglutide..............    CMP.........  12 months..  CHOLESTYRAMINE...........  Not Found    Overdue    HBA1C.......  6 months...  semaglutide..............  08- 02-    Lipid Panel.  12 months..  CHOLESTYRAMINE...........  Not Found    Overdue    [employer] Embedded Care Due Messages. Reference number: [id number].   2/26/2024 9:53:34 AM CST

## 2024-02-26 NOTE — TELEPHONE ENCOUNTER
Refill Routing Note   Medication(s) are not appropriate for processing by [facility] for the following reason(s):        Patient not seen by provider within 15 months  Required labs outdated: CMP, Lipid panel    [facility] action(s):  Defer   Requires appointment : Yes     Requires labs : Yes    Medication Therapy Plan:  Due for annual with PCP, labs Pended labs utilizing BestPracticeAdvisor (BPA) tab due to extra training from LPN      Appointments  past 12m or future 3m with PCP    Date Provider   Last Visit   5/30/2022 Deepak Bee MD   Next Visit   Visit date not found Deepak Bee MD   ED visits in past 90 days: 0        Note composed:10:07 AM 02/26/2024

## 2024-02-26 NOTE — TELEPHONE ENCOUNTER
Contacted pt to schedule an annual appointment. The person who answered the phone stated that the number no longer belonged to the pt.

## 2024-03-11 DIAGNOSIS — E11.22 TYPE 2 DIABETES MELLITUS WITH STAGE 3A CHRONIC KIDNEY DISEASE, WITHOUT LONG-TERM CURRENT USE OF INSULIN: ICD-10-CM

## 2024-03-11 DIAGNOSIS — N18.31 TYPE 2 DIABETES MELLITUS WITH STAGE 3A CHRONIC KIDNEY DISEASE, WITHOUT LONG-TERM CURRENT USE OF INSULIN: ICD-10-CM

## 2024-03-11 NOTE — TELEPHONE ENCOUNTER
No care due was identified.  [facility] Embedded Care Due Messages. Reference number: [id number].   3/11/2024 3:49:51 PM CDT

## 2024-03-11 NOTE — TELEPHONE ENCOUNTER
Refill Routing Note   Medication(s) are not appropriate for processing by [facility] for the following reason(s):        Patient not seen by provider within 15 months  Required labs outdated: A1C & CMP    [facility] action(s):  Defer               Appointments  past 12m or future 3m with PCP    Date Provider   Last Visit   5/30/2022 Deepak Bee MD   Next Visit   Visit date not found Deepak Bee MD   ED visits in past 90 days: 0        Note composed:6:11 PM 03/11/2024

## 2024-03-12 RX ORDER — METFORMIN HYDROCHLORIDE 500 MG/1
TABLET, EXTENDED RELEASE ORAL
Qty: 360 TABLET | Refills: 3 | Status: SHIPPED | OUTPATIENT
Start: 2024-03-12

## 2024-05-06 ENCOUNTER — TELEPHONE (OUTPATIENT)
Dept: INTERNAL MEDICINE | Facility: CLINIC | Age: 84
End: 2024-05-06
Payer: MEDICARE

## 2024-05-06 NOTE — TELEPHONE ENCOUNTER
----- Message from Bell Contreras sent at 5/3/2024 12:13 PM CDT -----  Regarding: Sebas [phone]  Type: Patient Call Back     Who called: Sebas called with [employer] [phone] ref num: [phone]     What is the request in detail: Verify DX for Diabetes, Cardio vascular disease, and CHF     Can the clinic reply by [facility]?     Would the patient rather a call back or a response via [facility]?     Best call back number:     Additional Information:

## 2024-05-09 DIAGNOSIS — E78.2 MIXED HYPERLIPIDEMIA: Chronic | ICD-10-CM

## 2024-05-09 RX ORDER — ATORVASTATIN CALCIUM 40 MG/1
TABLET, FILM COATED ORAL
Qty: 90 TABLET | Refills: 3 | Status: SHIPPED | OUTPATIENT
Start: 2024-05-09

## 2024-05-09 NOTE — TELEPHONE ENCOUNTER
Care Due:                  Date            Visit Type   Department     Provider  --------------------------------------------------------------------------------                                [facility]                              PRIMARY      MED/ INTERNAL  Last Visit: 05-      CARE ([facility])   MED/ PEDS      Deepak Bee  Next Visit: None Scheduled  None         None Found                                                            Last  Test          Frequency    Reason                     Performed    Due Date  --------------------------------------------------------------------------------    Office Visit  15 months..  atorvastatin, semaglutide  05- 08-    CMP.........  12 months..  atorvastatin.............  Not Found    Overdue    HBA1C.......  6 months...  semaglutide..............  08- 02-    Lipid Panel.  12 months..  atorvastatin.............  10-   10-    [facility] Embedded Care Due Messages. Reference number: [id number].   5/09/2024 3:09:55 AM CDT

## 2024-05-09 NOTE — TELEPHONE ENCOUNTER
Refill Routing Note   Medication(s) are not appropriate for processing by [facility] for the following reason(s):        Patient not seen by provider within 15 months  Required labs outdated    [facility] action(s):  Defer   Requires appointment : Yes   Requires labs : Yes             Appointments  past 12m or future 3m with PCP    Date Provider   Last Visit   5/30/2022 Deepak Bee MD   Next Visit   Visit date not found Deepak eBe MD   ED visits in past 90 days: 0        Note composed:11:06 AM 05/09/2024

## 2024-08-21 NOTE — TELEPHONE ENCOUNTER
Informed pharm it was okay to change rx.  
Pharm wants to know if they can change metformin mod er 1000 mg to metformin er?  
Yes this is fine.     Thank you,  Erik  
Warm

## 2024-10-13 DIAGNOSIS — I10 ESSENTIAL HYPERTENSION, BENIGN: ICD-10-CM

## 2024-10-13 DIAGNOSIS — I10 HYPERTENSION, UNSPECIFIED TYPE: ICD-10-CM

## 2024-10-13 DIAGNOSIS — I25.10 CORONARY ARTERY DISEASE INVOLVING NATIVE CORONARY ARTERY WITHOUT ANGINA PECTORIS, UNSPECIFIED WHETHER NATIVE OR TRANSPLANTED HEART: Chronic | ICD-10-CM

## 2024-10-14 NOTE — TELEPHONE ENCOUNTER
Refill Routing Note   Medication(s) are not appropriate for processing by [facility] for the following reason(s):        Required labs outdated  Patient not seen by provider within 15 months: lov w/ Rohit 2022, with Saurav 2021    [facility] action(s):  Defer   Requires appointment : Yes     Requires labs : Yes    Medication Therapy Plan:         Appointments  past 12m or future 3m with PCP    Date Provider   Last Visit   5/30/2022 Deepak Bee MD   Next Visit   Visit date not found Deepak Bee MD   ED visits in past 90 days: 0        Note composed:9:51 AM 10/14/2024

## 2024-10-14 NOTE — TELEPHONE ENCOUNTER
No care due was identified.  [facility] Embedded Care Due Messages. Reference number: [id number].   10/14/2024 9:20:28 AM CDT

## 2024-10-16 RX ORDER — LOSARTAN POTASSIUM 100 MG/1
100 TABLET ORAL DAILY
Qty: 30 TABLET | Refills: 0 | Status: SHIPPED | OUTPATIENT
Start: 2024-10-16

## 2024-10-16 RX ORDER — METOPROLOL SUCCINATE 50 MG/1
50 TABLET, EXTENDED RELEASE ORAL DAILY
Qty: 30 TABLET | Refills: 0 | Status: SHIPPED | OUTPATIENT
Start: 2024-10-16

## 2024-10-16 RX ORDER — AMLODIPINE BESYLATE 5 MG/1
5 TABLET ORAL DAILY
Qty: 30 TABLET | Refills: 0 | Status: SHIPPED | OUTPATIENT
Start: 2024-10-16

## 2024-11-06 DIAGNOSIS — I10 ESSENTIAL HYPERTENSION, BENIGN: ICD-10-CM

## 2024-11-06 DIAGNOSIS — I25.10 CORONARY ARTERY DISEASE INVOLVING NATIVE CORONARY ARTERY WITHOUT ANGINA PECTORIS, UNSPECIFIED WHETHER NATIVE OR TRANSPLANTED HEART: Chronic | ICD-10-CM

## 2024-11-06 DIAGNOSIS — I10 HYPERTENSION, UNSPECIFIED TYPE: ICD-10-CM

## 2024-11-06 NOTE — TELEPHONE ENCOUNTER
No care due was identified.  [facility] Embedded Care Due Messages. Reference number: [id number].   11/06/2024 8:55:19 AM CST

## 2024-11-07 RX ORDER — METOPROLOL SUCCINATE 50 MG/1
TABLET, EXTENDED RELEASE ORAL
Qty: 90 TABLET | Refills: 0 | OUTPATIENT
Start: 2024-11-07

## 2024-11-07 RX ORDER — LOSARTAN POTASSIUM 100 MG/1
TABLET ORAL
Qty: 90 TABLET | Refills: 0 | OUTPATIENT
Start: 2024-11-07

## 2024-11-07 RX ORDER — AMLODIPINE BESYLATE 5 MG/1
TABLET ORAL
Qty: 90 TABLET | Refills: 0 | OUTPATIENT
Start: 2024-11-07

## 2024-11-07 NOTE — TELEPHONE ENCOUNTER
Refill Routing Note   Medication(s) are not appropriate for processing by [facility] for the following reason(s):        Required labs outdated  Required vitals outdated  Patient not seen by provider within 15 months    [facility] action(s):  Defer      Medication Therapy Plan: LOV(5/30/22)      Appointments  past 12m or future 3m with PCP    Date Provider   Last Visit   5/30/2022 Deepak Bee MD   Next Visit   Visit date not found Deepak Bee MD   ED visits in past 90 days: 0        Note composed:8:52 AM 11/07/2024

## 2024-11-07 NOTE — TELEPHONE ENCOUNTER
We need to try and get in touch with patient, it has been two years since we last saw him. [facility] looks [facility]. If he intends to keep us as his primary care team, we will need to get him set up with a visit. If he has [facility]ed to [facility], we can remove ourselves as his primary care team, and he will need to get his medications through their office.    Thanks!  Corrine [facility]

## 2025-05-19 DIAGNOSIS — Z01.89 ENCOUNTER FOR OTHER SPECIFIED SPECIAL EXAMINATIONS: Primary | ICD-10-CM

## 2025-06-17 ENCOUNTER — HOSPITAL ENCOUNTER (OUTPATIENT)
Dept: RADIOLOGY | Facility: HOSPITAL | Age: 85
Discharge: HOME | End: 2025-06-17
Attending: STUDENT IN AN ORGANIZED HEALTH CARE EDUCATION/TRAINING PROGRAM
Payer: OTHER GOVERNMENT

## 2025-06-17 DIAGNOSIS — Z01.89 ENCOUNTER FOR OTHER SPECIFIED SPECIAL EXAMINATIONS: ICD-10-CM

## 2025-06-17 PROCEDURE — 71250 CT THORAX DX C-: CPT | Mod: TC

## 2025-06-17 PROCEDURE — 71250 CT THORAX DX C-: CPT | Mod: 26,,, | Performed by: RADIOLOGY

## 2025-09-03 ENCOUNTER — TELEPHONE (OUTPATIENT)
Dept: INTERNAL MEDICINE | Facility: CLINIC | Age: 85
End: 2025-09-03
Payer: OTHER GOVERNMENT